# Patient Record
Sex: MALE | Race: ASIAN | Employment: FULL TIME | URBAN - METROPOLITAN AREA
[De-identification: names, ages, dates, MRNs, and addresses within clinical notes are randomized per-mention and may not be internally consistent; named-entity substitution may affect disease eponyms.]

---

## 2017-01-30 DIAGNOSIS — E04.1 THYROID NODULE: ICD-10-CM

## 2017-01-30 DIAGNOSIS — E78.00 ELEVATED CHOLESTEROL: Primary | ICD-10-CM

## 2017-01-30 DIAGNOSIS — R73.03 PREDIABETES: ICD-10-CM

## 2017-01-30 DIAGNOSIS — I49.8 BRUGADA SYNDROME: ICD-10-CM

## 2017-01-30 NOTE — LETTER
3/20/2017 9:02 AM 
 
Mr. Tayla Villaseñor 
[address] [facility] [address] Dear Tayla Villaseñor: 
Please find your most recent results below. Resulted Orders METABOLIC PANEL, COMPREHENSIVE Result Value Ref Range Glucose 100 (H) 65 - 99 mg/dL BUN 17 6 - 24 mg/dL Creatinine 0.95 0.76 - 1.27 mg/dL GFR est non-AA 99 >59 mL/min/1.73 GFR est  >59 mL/min/1.73  
 BUN/Creatinine ratio 18 9 - 20 Sodium 139 134 - 144 mmol/L Potassium 4.6 3.5 - 5.2 mmol/L Chloride 101 96 - 106 mmol/L  
 CO2 22 18 - 29 mmol/L Calcium 9.3 8.7 - 10.2 mg/dL Protein, total 7.1 6.0 - 8.5 g/dL Albumin 4.4 3.5 - 5.5 g/dL GLOBULIN, TOTAL 2.7 1.5 - 4.5 g/dL A-G Ratio 1.6 1.1 - 2.5 Comment: **Effective March 13, 2017 the reference interval** 
  for A/G Ratio will be changing to: Age                Male          Female 0 -  7 days       1.1 - 2.3       1.1 - 2.3 
          8 - 30 days       1.2 - 2.8       1.2 - 2.8 
          1 -  6 months     1.3 - 3.6       1.3 - 3.6 
   7 months -  5 years      1.5 - 2.6       1.5 - 2.6 
             > 5 years      1.2 - 2.2       1.2 - 2.2 Bilirubin, total 0.7 0.0 - 1.2 mg/dL Alk. phosphatase 61 39 - 117 IU/L  
 AST (SGOT) 26 0 - 40 IU/L  
 ALT (SGPT) 34 0 - 44 IU/L Narrative Performed at:  [address]  [id number] : Teo Dowell MD, Phone:  [phone] LIPID PANEL Result Value Ref Range Cholesterol, total 161 100 - 199 mg/dL Triglyceride 121 0 - 149 mg/dL HDL Cholesterol 36 (L) >39 mg/dL VLDL, calculated 24 5 - 40 mg/dL LDL, calculated 101 (H) 0 - 99 mg/dL Narrative Performed at:  [address]  [id number] : Teo Dowell MD, Phone:  [phone] CK Result Value Ref Range Creatine Kinase,Total 272 (H) 24 - 204 U/L Narrative Performed at:  [address]  [id number] : Sherri Lopez MD, Phone:  [phone] TSH 3RD GENERATION Result Value Ref Range TSH 1.360 0.450 - 4.500 uIU/mL Narrative Performed at:  [address]  [id number] : Sherri Lopez MD, Phone:  [phone] HEMOGLOBIN A1C WITH EAG Result Value Ref Range Hemoglobin A1c 6.4 (H) 4.8 - 5.6 % Comment:  
            Pre-diabetes: 5.7 - 6.4 Diabetes: >6.4 Glycemic control for adults with diabetes: <7.0 Estimated average glucose 137 mg/dL Narrative Performed at:  [address]  [id number] : Sherri Lopez MD, Phone:  [phone] CVD REPORT Result Value Ref Range INTERPRETATION Note Comment:  
   Supplement report is available. Narrative Performed at:  [address] [place] [address]  [id number] : Arturo Elizalde PhD, Phone:  [phone] RECOMMENDATIONS: 
Comprehensive Metabolic Panel: [place] Hemoglobin A1C: 6.4 Pre Diabetes (Suggestive of increased risk of developing diabetes in future) Thyroid Tests: Normal  
Cholesterol: marginally Elevated LDL (Bad cholesterol). Marginally  Low HDL  (Good Cholesterol) CK : Elevated to 272. Advice: Stick to low fat low cholesterol diet , exercise regularly and if on medications be compliant with your medications. Avoid concentrated sweets. Make a follow-up appointment with your new PCP. Please call me if you have any questions: [phone] Sincerely, Paulina Riddle MD

## 2017-02-01 ENCOUNTER — OFFICE VISIT (OUTPATIENT)
Dept: CARDIOLOGY CLINIC | Age: 42
End: 2017-02-01

## 2017-02-01 DIAGNOSIS — Z95.810 PRESENCE OF AUTOMATIC CARDIOVERTER/DEFIBRILLATOR (AICD): Primary | ICD-10-CM

## 2017-03-01 LAB
ALBUMIN SERPL-MCNC: 4.4 G/DL (ref 3.5–5.5)
ALBUMIN/GLOB SERPL: 1.6 {RATIO} (ref 1.1–2.5)
ALP SERPL-CCNC: 61 IU/L (ref 39–117)
ALT SERPL-CCNC: 34 IU/L (ref 0–44)
AST SERPL-CCNC: 26 IU/L (ref 0–40)
BILIRUB SERPL-MCNC: 0.7 MG/DL (ref 0–1.2)
BUN SERPL-MCNC: 17 MG/DL (ref 6–24)
BUN/CREAT SERPL: 18 (ref 9–20)
CALCIUM SERPL-MCNC: 9.3 MG/DL (ref 8.7–10.2)
CHLORIDE SERPL-SCNC: 101 MMOL/L (ref 96–106)
CHOLEST SERPL-MCNC: 161 MG/DL (ref 100–199)
CK SERPL-CCNC: 272 U/L (ref 24–204)
CO2 SERPL-SCNC: 22 MMOL/L (ref 18–29)
CREAT SERPL-MCNC: 0.95 MG/DL (ref 0.76–1.27)
EST. AVERAGE GLUCOSE BLD GHB EST-MCNC: 137 MG/DL
GLOBULIN SER CALC-MCNC: 2.7 G/DL (ref 1.5–4.5)
GLUCOSE SERPL-MCNC: 100 MG/DL (ref 65–99)
HBA1C MFR BLD: 6.4 % (ref 4.8–5.6)
HDLC SERPL-MCNC: 36 MG/DL
INTERPRETATION, 910389: NORMAL
LDLC SERPL CALC-MCNC: 101 MG/DL (ref 0–99)
POTASSIUM SERPL-SCNC: 4.6 MMOL/L (ref 3.5–5.2)
PROT SERPL-MCNC: 7.1 G/DL (ref 6–8.5)
SODIUM SERPL-SCNC: 139 MMOL/L (ref 134–144)
TRIGL SERPL-MCNC: 121 MG/DL (ref 0–149)
TSH SERPL DL<=0.005 MIU/L-ACNC: 1.36 UIU/ML (ref 0.45–4.5)
VLDLC SERPL CALC-MCNC: 24 MG/DL (ref 5–40)

## 2017-03-18 NOTE — PROGRESS NOTES
Comprehensive Metabolic Panel : OK  Hemoglobin A1C : 6.4 Pre Diabetes ( Suggestive of increased risk of developing diabetes in future )  Thyroid Tests: Normal  Cholesterol : marginally Elevated LDL ( Bad cholesterol ). Marginally  Low HDL  ( Good Cholesterol )  CK : Elevated to 272. Adv:   Stick to low fat low cholesterol diet , exercise regularly and if on medications be compliant with your medications. Keep your follow-up appointment as scheduled. Avoid concentrated sweets.

## 2017-03-20 ENCOUNTER — OFFICE VISIT (OUTPATIENT)
Dept: FAMILY MEDICINE CLINIC | Age: 42
End: 2017-03-20

## 2017-03-20 VITALS
TEMPERATURE: 97.9 F | WEIGHT: 196.6 LBS | DIASTOLIC BLOOD PRESSURE: 90 MMHG | BODY MASS INDEX: 29.8 KG/M2 | OXYGEN SATURATION: 97 % | HEART RATE: 66 BPM | HEIGHT: 68 IN | SYSTOLIC BLOOD PRESSURE: 134 MMHG | RESPIRATION RATE: 16 BRPM

## 2017-03-20 DIAGNOSIS — R73.03 PREDIABETES: ICD-10-CM

## 2017-03-20 DIAGNOSIS — Z00.00 PHYSICAL EXAM: Primary | ICD-10-CM

## 2017-03-20 DIAGNOSIS — I49.8 BRUGADA SYNDROME: ICD-10-CM

## 2017-03-20 RX ORDER — CHOLECALCIFEROL (VITAMIN D3) 125 MCG
CAPSULE ORAL
COMMUNITY
End: 2019-01-18

## 2017-03-20 NOTE — PROGRESS NOTES
Lennox Cooks is a 39 y.o. male   Chief Complaint   Patient presents with    Physical   1. Have you been to the ER, urgent care clinic since your last visit? Hospitalized since your last visit? No    2. Have you seen or consulted any other health care providers outside of the [address] [place] [address] since your last visit? Include any pap smears or colon screening.  No

## 2017-03-20 NOTE — MR AVS SNAPSHOT
Visit Information Date & Time Provider Department Dept. Phone Encounter #  
 3/20/2017  3:20 PM MD Guillermo Boogie Marcia [id number] Follow-up Instructions Return in about 6 months (around 9/20/2017) for Follow-up, Fasting, CHOL, Prediabetes. Your Appointments 6/8/2017  2:30 PM  
ROUTINE CARE with MD Ant R[facility]) Appt Note: 9 month follow- up; r/s from 06/05; f/u thyroid cp0.00  
 [address]  
[address] [address]  
  
    
 8/24/2017  2:45 PM  
PACEMAKER with Beltran Sharma [facility] ([place] SCHEDULING) Appt Note: bio icd, rc, annual thresh and HM, see Augustin  $60CP anand 7/21/16  
 [address]  
[address] [facility]  
  
    
 8/24/2017  3:00 PM  
ESTABLISHED PATIENT with Meka Gregg MD  
[facility] ([facility]) Appt Note: bio icd, rc, annual thresh and HM, see Augustin  $60CP anand 7/21/16  
 [address] [address]  
[address] [address]  
  
    
  
 5/16/2017  9:15 AM  
REMOTE OFFICE VISIT with Simon Quinteros [facility] ([place] SCHEDULING) Appt Note: bio icd, rc  
 [address]  
[address] [facility] [address] [address] Upcoming Health Maintenance Date Due DTaP/Tdap/Td series (2 - Td) 11/7/2026 Allergies as of 3/20/2017  Review Complete On: 3/20/2017 By: Mariann Alvarenga MD  
  
 Severity Noted Reaction Type Reactions Beef Containing Products  02/02/2015    Unknown (comments) Synagogue preference Pork/porcine Containing Products  02/02/2015    Unknown (comments) Jew preference Current Immunizations  Reviewed on 2/3/2015 No immunizations on file. Not reviewed this visit You Were Diagnosed With   
  
 Codes Comments Physical exam    -  Primary ICD-10-CM: Z00.00 ICD-9-CM: V70.9 Brugada syndrome     ICD-10-CM: I49.8 ICD-9-CM: 746.89 Prediabetes     ICD-10-CM: R73.03 
ICD-9-CM: 790.29 Vitals BP Pulse Temp Resp Height(growth percentile) Weight(growth percentile) 134/90 (BP 1 Location: Left arm, BP Patient Position: Sitting) 66 97.9 °F (36.6 °C) (Oral) 16 5' 8\" (1.727 m) 196 lb 9.6 oz (89.2 kg) SpO2 BMI Smoking Status 97% 29.89 kg/m2 Never Smoker Vitals History BMI and BSA Data Body Mass Index Body Surface Area  
 29.89 kg/m 2 2.07 m 2 Preferred Pharmacy Pharmacy Name Phone [id number] [address] [facility] AT [address] [place] [address] [phone] Your Updated Medication List  
  
   
This list is accurate as of: 3/20/17  4:46 PM.  Always use your most recent med list.  
  
  
  
  
 fexofenadine 180 mg tablet Commonly known as:  ALLERGY RELIEF (FEXOFENADINE) TAKE 1 TABLET BY MOUTH EVERY DAY  
  
 fish oil-omega-3 fatty acids 340-1,000 mg capsule Take 1 Cap by mouth daily. simvastatin 10 mg tablet Commonly known as:  ZOCOR  
TAKE 1 TABLET BY MOUTH EVERY EVENING  
  
 VITAMIN D3 2,000 unit Tab Generic drug:  cholecalciferol (vitamin D3) Take  by mouth. Follow-up Instructions Return in about 6 months (around 9/20/2017) for Follow-up, Fasting, CHOL, Prediabetes. To-Do List   
 05/22/2017 10:00 AM  
  Appointment with [address] [place] [address] at [facility] ([phone]) GENERAL INSTRUCTIONS  1. Bring outside films ([employer]) pertaining to the affected area with you on the day of appointment.  2. A written order with a valid diagnosis and Physicians signature is required for all scheduled tests. 3. Check in at registration 30 minutes before your appointment time unless you were instructed to do otherwise. Patient Instructions A Healthy Lifestyle: Care Instructions Your Care Instructions A healthy lifestyle can help you feel good, stay at a healthy weight, and have plenty of energy for both work and play. A healthy lifestyle is something you can share with your whole family. A healthy lifestyle also can lower your risk for serious health problems, such as high blood pressure, heart disease, and diabetes. You can follow a few steps listed below to improve your health and the health of your family. Follow-up care is a key part of your treatment and safety. Be sure to make and go to all appointments, and call your doctor if you are having problems. Its also a good idea to know your test results and keep a list of the medicines you take. How can you care for yourself at home? · Do not eat too much sugar, fat, or fast foods. You can still have dessert and treats now and then. The goal is moderation. · Start small to improve your eating habits. Pay attention to portion sizes, drink less juice and soda pop, and eat more fruits and vegetables. ¨ Eat a healthy amount of food. A 3-ounce serving of meat, for example, is about the size of a deck of cards. Fill the rest of your plate with vegetables and whole grains. ¨ Limit the amount of soda and sports drinks you have every day. Drink more water when you are thirsty. ¨ Eat at least 5 servings of fruits and vegetables every day. It may seem like a lot, but it is not hard to reach this goal. A serving or helping is 1 piece of fruit, 1 cup of vegetables, or 2 cups of leafy, raw vegetables. Have an apple or some carrot sticks as an afternoon snack instead of a candy bar.  Try to have fruits and/or vegetables at every meal. 
 · Make exercise part of your daily routine. You may want to start with simple activities, such as walking, bicycling, or slow swimming. Try to be active 30 to 60 minutes every day. You do not need to do all 30 to 60 minutes all at once. For example, you can exercise 3 times a day for 10 or 20 minutes. Moderate exercise is safe for most people, but it is always a good idea to talk to your doctor before starting an exercise program. 
· Keep moving. [place] Feeling the lawn, work in the garden, or [employer]. Take the stairs instead of the elevator at work. · If you smoke, quit. People who smoke have an increased risk for heart attack, stroke, cancer, and other lung illnesses. Quitting is hard, but there are ways to boost your chance of quitting tobacco for good. ¨ Use nicotine gum, patches, or lozenges. ¨ Ask your doctor about stop-smoking programs and medicines. ¨ Keep trying. In addition to reducing your risk of diseases in the future, you will notice some benefits soon after you stop using tobacco. If you have shortness of breath or asthma symptoms, they will likely get better within a few weeks after you quit. · Limit how much alcohol you drink. Moderate amounts of alcohol (up to 2 drinks a day for men, 1 drink a day for women) are okay. But drinking too much can lead to liver problems, high blood pressure, and other health problems. Family health If you have a family, there are many things you can do together to improve your health. · Eat meals together as a family as often as possible. · Eat healthy foods. This includes fruits, vegetables, lean meats and dairy, and whole grains. · Include your family in your fitness plan. Most people think of activities such as jogging or tennis as the way to fitness, but there are many ways you and your family can be more active. Anything that makes you breathe hard and gets your heart pumping is exercise. Here are some tips: ¨ Walk to do errands or to take your child to school or the bus. ¨ Go for a family bike ride after dinner instead of watching TV. Where can you learn more? Go to http://joanne-magali.info/. Enter Z937 in the search box to learn more about \"A Healthy Lifestyle: Care Instructions. \" Current as of: July 26, 2016 Content Version: 11.1 © 1776-4198 Data Elite. Care instructions adapted under license by Cytoo (which disclaims liability or warranty for this information). If you have questions about a medical condition or this instruction, always ask your healthcare professional. [address] any warranty or liability for your use of this information. Introducing [facility] & HEALTH SERVICES! Dear Nay Ashford: Thank you for requesting a [employer] account. Our records indicate that you already have an active [employer] account. You can access your account anytime at [email or web address] Did you know that you can access your hospital and ER discharge instructions at any time in [employer]? You can also review all of your test results from your hospital stay or ER visit. Additional Information If you have questions, please visit the Frequently Asked Questions section of the [employer] website at [email or web address] Remember, [employer] is NOT to be used for urgent needs. For medical emergencies, dial 911. Now available from your iPhone and Android! Please provide this summary of care documentation to your next provider. Your primary care clinician is listed as Tiffanie Corrigan. If you have any questions after today's visit, please call [phone].

## 2017-03-20 NOTE — PROGRESS NOTES
HISTORY OF PRESENT ILLNESS  Ankur Thurman is a 39 y.o. male. Physical   The history is provided by the patient (Here for physical . He is generally well . Known to have Brugada syndrome and hypercholesterolemia . ). Review of Systems   Constitutional: Negative. HENT: Negative. Eyes: Negative. Respiratory: Negative. Cardiovascular: Negative. Gastrointestinal: Negative. Genitourinary: Negative. Musculoskeletal: Negative. Skin: Negative. Neurological: Negative. Endo/Heme/Allergies: Negative. Psychiatric/Behavioral: Negative. Physical Exam  General:   Head: Alert, cooperative, no distress, appears stated age. Normocephalic and atraumatic   Eyes:  Conjunctivae/corneas clear. PERRL, EOMs intact. Ears:  Normal TMs and external ear canals both ears. Nose: Nares normal. Septum midline. Mucosa normal. No drainage or sinus tenderness. Mouth:  Throat: Lips, mucosa, and tongue normal. Teeth and gums normal.  No lesions or exudates. Neck: Supple, symmetrical, trachea midline, no adenopathy, thyroid: no enlargement/tenderness/nodules. Back:   Symmetric, no curvature. ROM normal. No CVA tenderness. Lungs:   Clear to auscultation bilaterally. Heart:  Regular rate and rhythm, S1, S2 normal, no murmur, click, rub or gallop. Abdomen:      G.U: Soft, non-tender. Bowel sounds normal. No masses,  No organomegaly. Hernial sites normal   Genitalia normal    Extremities: Extremities normal, atraumatic, no cyanosis or edema. Pulses: 2+ and symmetric all extremities. Skin: Skin color, texture, turgor normal. No rashes or lesions   Lymph nodes: Cervical & supraclavicular nodes normal.   Neurologic: CNII-XII intact. Normal strength, sensation and reflexes throughout. Psych:                      Normal mood and affect     ASSESSMENT and PLAN  39 yr old male for physical with   Prediabetes   Hx of Brugada syndrome . Encounter Diagnoses   Name Primary?     Physical exam Yes  Brugada syndrome     Prediabetes      Orders Placed This Encounter    fish oil-omega-3 fatty acids 340-1,000 mg capsule    cholecalciferol, vitamin D3, (VITAMIN D3) 2,000 unit tab   Follow-up Disposition:  Return in about 6 months (around 9/20/2017) for Follow-up, Fasting, CHOL, Prediabetes. Reviewed and discussed previous lab results. Results of labs requested today will be notified when available. He was advised to continue with current medications. Discussed lifestyle issues and health guidance given. He was given an after visit summary which includes diagnoses, vital signs, current medications, &  authorized prescriptions. Patient verbalized understanding.

## 2017-03-20 NOTE — PATIENT INSTRUCTIONS

## 2017-05-16 ENCOUNTER — OFFICE VISIT (OUTPATIENT)
Dept: CARDIOLOGY CLINIC | Age: 42
End: 2017-05-16

## 2017-05-16 DIAGNOSIS — Z95.810 PRESENCE OF AUTOMATIC CARDIOVERTER/DEFIBRILLATOR (AICD): Primary | ICD-10-CM

## 2017-05-23 ENCOUNTER — HOSPITAL ENCOUNTER (OUTPATIENT)
Dept: ULTRASOUND IMAGING | Age: 42
Discharge: HOME | End: 2017-05-23
Attending: INTERNAL MEDICINE
Payer: COMMERCIAL

## 2017-05-23 DIAGNOSIS — E04.1 THYROID NODULE: ICD-10-CM

## 2017-05-23 PROCEDURE — 76536 US EXAM OF HEAD AND NECK: CPT

## 2017-06-04 ENCOUNTER — PATIENT MESSAGE (OUTPATIENT)
Dept: FAMILY MEDICINE CLINIC | Age: 42
End: 2017-06-04

## 2017-06-05 RX ORDER — MINERAL OIL
ENEMA (ML) RECTAL
Qty: 90 TAB | Refills: 1 | Status: SHIPPED | OUTPATIENT
Start: 2017-06-05 | End: 2018-04-11 | Stop reason: SDUPTHER

## 2017-06-05 NOTE — TELEPHONE ENCOUNTER
From: Vic Claudio  To: Prince Genao MD  Sent: 6/4/2017 4:44 PM EDT  Subject: Prescription Question    I was a patient of Erick Wilcox , he suggested that I make you my primary physician so I scheduled my first appointment for my regular check in July 10 th , I was wondering if you can refill in the meanwhile refill my prescription for allergy medicine fexofenadine , I will ask [facility] to contact you for a refill which Dr Anne Raygoza prescribed

## 2017-06-19 ENCOUNTER — OFFICE VISIT (OUTPATIENT)
Dept: FAMILY MEDICINE CLINIC | Age: 42
End: 2017-06-19

## 2017-06-19 VITALS
HEART RATE: 80 BPM | OXYGEN SATURATION: 98 % | TEMPERATURE: 97.8 F | DIASTOLIC BLOOD PRESSURE: 72 MMHG | HEIGHT: 68 IN | WEIGHT: 198 LBS | SYSTOLIC BLOOD PRESSURE: 110 MMHG | BODY MASS INDEX: 30.01 KG/M2 | RESPIRATION RATE: 16 BRPM

## 2017-06-19 DIAGNOSIS — E78.5 HYPERLIPIDEMIA LDL GOAL <100: Primary | ICD-10-CM

## 2017-06-19 DIAGNOSIS — R73.03 PREDIABETES: ICD-10-CM

## 2017-06-19 DIAGNOSIS — Z95.810 S/P ICD (INTERNAL CARDIAC DEFIBRILLATOR) PROCEDURE: ICD-10-CM

## 2017-06-19 DIAGNOSIS — R74.8 ELEVATED CPK: ICD-10-CM

## 2017-06-19 RX ORDER — AMOXICILLIN 875 MG/1
TABLET, FILM COATED ORAL
Refills: 0 | COMMUNITY
Start: 2017-04-17 | End: 2017-06-19 | Stop reason: ALTCHOICE

## 2017-06-19 RX ORDER — AMOXICILLIN 500 MG/1
CAPSULE ORAL
COMMUNITY
Start: 2017-06-01 | End: 2017-06-19 | Stop reason: ALTCHOICE

## 2017-06-19 RX ORDER — DICLOFENAC SODIUM 75 MG/1
TABLET, DELAYED RELEASE ORAL
Refills: 0 | COMMUNITY
Start: 2017-05-08 | End: 2017-06-19 | Stop reason: ALTCHOICE

## 2017-06-19 RX ORDER — METHYLPREDNISOLONE 4 MG/1
TABLET ORAL
Refills: 0 | COMMUNITY
Start: 2017-04-17 | End: 2017-06-19 | Stop reason: ALTCHOICE

## 2017-06-19 RX ORDER — ONDANSETRON 4 MG/1
TABLET, ORALLY DISINTEGRATING ORAL
COMMUNITY
Start: 2017-06-01 | End: 2017-06-19 | Stop reason: ALTCHOICE

## 2017-06-19 NOTE — MR AVS SNAPSHOT
Visit Information Date & Time Provider Department Dept. Phone Encounter #  
 6/19/2017  5:20 PM Xavier Rodríguez, [address] [phone] [id number] Follow-up Instructions Return in about 4 months (around 10/19/2017) for  follow up, CHO, DM. Your Appointments 6/23/2017 10:10 AM  
ROUTINE CARE with Giovanna Lofton MD  
[place] [facility]) [address] [facility] [address]  
[address] [place] [address]  
  
    
 8/24/2017  2:45 PM  
PACEMAKER with Cande Velasquez [facility] ([place] SCHEDULING) Appt Note: bio icd, rc, annual thresh and HM, see Augustin  $60CP anand 7/21/16  
 [address]  
[address] [facility]  
  
    
 8/24/2017  3:00 PM  
ESTABLISHED PATIENT with Keisha Garcia MD  
[facility] ([facility]) Appt Note: bio icd, rc, annual thresh and HM, see Augustin  $60CP anand 7/21/16  
 [address]  
[address] [facility] [facility] [address] Upcoming Health Maintenance Date Due INFLUENZA AGE 9 TO ADULT 8/1/2017 DTaP/Tdap/Td series (2 - Td) 11/7/2026 Allergies as of 6/19/2017  Review Complete On: 6/19/2017 By: Xavier Rodríguez MD  
  
 Severity Noted Reaction Type Reactions Beef Containing Products  02/02/2015    Unknown (comments) Baptism preference Pork/porcine Containing Products  02/02/2015    Unknown (comments) Baptism preference Current Immunizations  Reviewed on 2/3/2015 No immunizations on file. Not reviewed this visit You Were Diagnosed With   
  
 Codes Comments Hyperlipidemia LDL goal <100    -  Primary ICD-10-CM: E78.5 ICD-9-CM: 272.4 S/P ICD (internal cardiac defibrillator) procedure     ICD-10-CM: Z95.810 ICD-9-CM: V45.02 Prediabetes     ICD-10-CM: R73.03 
ICD-9-CM: 790.29 Elevated CPK     ICD-10-CM: R74.8 ICD-9-CM: 790.5 Vitals BP Pulse Temp Resp Height(growth percentile) Weight(growth percentile) 110/72 (BP 1 Location: Left arm, BP Patient Position: Sitting) 80 97.8 °F (36.6 °C) (Oral) 16 5' 8\" (1.727 m) 198 lb (89.8 kg) SpO2 BMI Smoking Status 98% 30.11 kg/m2 Never Smoker Vitals History BMI and BSA Data Body Mass Index Body Surface Area  
 30.11 kg/m 2 2.08 m 2 Preferred Pharmacy Pharmacy Name Phone [facility]/PHARMACY #[id number]- [facility] [address] [facility] AT [address] [place] [address] [phone] Your Updated Medication List  
  
   
This list is accurate as of: 6/19/17  6:03 PM.  Always use your most recent med list.  
  
  
  
  
 fexofenadine 180 mg tablet Commonly known as:  ALLERGY RELIEF (FEXOFENADINE) TAKE 1 TABLET BY MOUTH EVERY DAY  
  
 fish oil-omega-3 fatty acids 340-1,000 mg capsule Take 1 Cap by mouth daily. simvastatin 10 mg tablet Commonly known as:  ZOCOR  
TAKE 1 TABLET BY MOUTH EVERY EVENING  
  
 VITAMIN D3 2,000 unit Tab Generic drug:  cholecalciferol (vitamin D3) Take  by mouth. We Performed the Following CK [id number] CPT(R)] HEMOGLOBIN A1C WITH EAG [64674 CPT(R)] LIPID PANEL [97165 CPT(R)] METABOLIC PANEL, COMPREHENSIVE [45582 CPT(R)] Follow-up Instructions Return in about 4 months (around 10/19/2017) for  follow up, CHO, DM. Patient Instructions High Cholesterol: Care Instructions Your Care Instructions Cholesterol is a type of fat in your blood. It is needed for many body functions, such as making new cells. Cholesterol is made by your body. It also comes from food you eat.  High cholesterol means that you have too much of the fat in your blood. This raises your risk of a heart attack and stroke. LDL and HDL are part of your total cholesterol. LDL is the \"bad\" cholesterol. High LDL can raise your risk for heart disease, heart attack, and stroke. HDL is the \"good\" cholesterol. It helps clear bad cholesterol from the body. High HDL is linked with a lower risk of heart disease, heart attack, and stroke. Your cholesterol levels help your doctor find out your risk for having a heart attack or stroke. You and your doctor can talk about whether you need to lower your risk and what treatment is best for you. A heart-healthy lifestyle along with medicines can help lower your cholesterol and your risk. The way you choose to lower your risk will depend on how high your risk is for heart attack and stroke. It will also depend on how you feel about taking medicines. Follow-up care is a key part of your treatment and safety. Be sure to make and go to all appointments, and call your doctor if you are having problems. It's also a good idea to know your test results and keep a list of the medicines you take. How can you care for yourself at home? · Eat a variety of foods every day. Good choices include fruits, vegetables, whole grains (like oatmeal), dried beans and peas, nuts and seeds, soy products (like tofu), and fat-free or low-fat dairy products. · Replace butter, margarine, and hydrogenated or partially hydrogenated oils with olive and canola oils. (Canola oil margarine without trans fat is fine.) · Replace red meat with fish, poultry, and soy protein (like tofu). · Limit processed and packaged foods like chips, crackers, and cookies. · Bake, broil, or steam foods. Don't springer them. · Be physically active. Get at least 30 minutes of exercise on most days of the week. Walking is a good choice. You also may want to do other activities, such as running, swimming, cycling, or playing tennis or team sports. · Stay at a healthy weight or lose weight by making the changes in eating and physical activity listed above. Losing just a small amount of weight, even 5 to 10 pounds, can reduce your risk for having a heart attack or stroke. · Do not smoke. When should you call for help? Watch closely for changes in your health, and be sure to contact your doctor if: 
· You need help making lifestyle changes. · You have questions about your medicine. Where can you learn more? Go to http://joanne-magali.info/. Enter B603 in the search box to learn more about \"High Cholesterol: Care Instructions. \" Current as of: April 3, 2017 Content Version: 11.3 © 1736-8207 Credorax. Care instructions adapted under license by Soteira (which disclaims liability or warranty for this information). If you have questions about a medical condition or this instruction, always ask your healthcare professional. [address] any warranty or liability for your use of this information. Introducing [facility] & HEALTH SERVICES! Dear Naya Carlson: Thank you for requesting a [employer] account. Our records indicate that you already have an active [employer] account. You can access your account anytime at [email or web address] Did you know that you can access your hospital and ER discharge instructions at any time in [employer].? You can also review all of your test results from your hospital stay or ER visit. Additional Information If you have questions, please visit the Frequently Asked Questions section of the [employer] website at [email or web address] Remember, [employer] is NOT to be used for urgent needs. For medical emergencies, dial 911. Now available from your iPhone and Android! Please provide this summary of care documentation to your next provider. Your primary care clinician is listed as Gil Jewell.  If you have any questions after today's visit, please call [phone].

## 2017-06-19 NOTE — PROGRESS NOTES
HISTORY OF PRESENT ILLNESS  Bhumi Beckford is a 39 y.o. male. He is a new patient and is here to establish care. Previous followed by Dr Bisi Heath. The following sections were reviewed & updated as appropriate: PMH, PL, PSH, and SH. He was seen for follow up on dyslipidemia, prediabetes and elevated CPK. He is s/p AICD for Brugada sx. HPI  Cardiovascular Review  The patient has hyperlipidemia and Brugada sx, s/p AICD. He reports taking medications as instructed, no medication side effects noted, no chest pain on exertion, no dyspnea on exertion, no swelling of ankles, no orthostatic dizziness or lightheadedness, no orthopnea or paroxysmal nocturnal dyspnea, no palpitations, no intermittent claudication symptoms, no muscle aches or pain. Diet and Lifestyle: generally follows a low fat low cholesterol diet, generally follows a low sodium diet, follows a diabetic diet regularly, exercises regularly. Lab review: labs reviewed and discussed with patient. Medicines: On Simvastatin 10 mg and Fish oil  He follows cardiology on annual bases and gets AICD interrogation and monitoring remotely. He has side effect of statin in form pf elevated CPK. Endocrine Review  He is seen for prediabetes. Since last visit he reports: no polyuria or polydipsia, no chest pain, dyspnea or TIA's, no numbness, tingling or pain in extremities, no unusual visual symptoms, no hypoglycemia, no significant changes. Home glucose monitoring: is not performed  no  n/a - not on medications (diet controlled). Medication side effects: n/a. Diabetic diet compliance: compliant all of the time. Lab review: labs reviewed and discussed with patient. Eye exam: UTD.       Lab Results   Component Value Date/Time    Hemoglobin A1c 6.4 02/28/2017 07:46 AM    Glucose 100 02/28/2017 07:46 AM    Creatinine 0.95 02/28/2017 07:46 AM    Cholesterol, total 161 02/28/2017 07:46 AM    HDL Cholesterol 36 02/28/2017 07:46 AM    LDL, calculated 101 02/28/2017 07:46 AM    Triglyceride 121 02/28/2017 07:46 AM       Review of Systems   Constitutional: Negative for chills, fever and malaise/fatigue. HENT: Negative for congestion, ear pain, sore throat and tinnitus. Eyes: Negative for blurred vision, double vision, pain and discharge. Respiratory: Negative for cough, shortness of breath and wheezing. Cardiovascular: Negative for chest pain, palpitations and leg swelling. Gastrointestinal: Negative for abdominal pain, blood in stool, constipation, diarrhea, nausea and vomiting. Genitourinary: Negative for dysuria, frequency, hematuria and urgency. Musculoskeletal: Negative for back pain, joint pain and myalgias. Skin: Negative for rash. Neurological: Negative for dizziness, tremors, seizures and headaches. Endo/Heme/Allergies: Negative for polydipsia. Does not bruise/bleed easily. Psychiatric/Behavioral: Negative for depression and substance abuse. The patient is not nervous/anxious. Physical Exam   Constitutional: He is oriented to person, place, and time. He appears well-developed and well-nourished. HENT:   Head: Normocephalic and atraumatic. Right Ear: External ear normal.   Mouth/Throat: Oropharynx is clear and moist. No oropharyngeal exudate. Eyes: Conjunctivae and EOM are normal. Pupils are equal, round, and reactive to light. No scleral icterus. Neck: Normal range of motion. Neck supple. No JVD present. No thyromegaly present. Cardiovascular: Normal rate, regular rhythm, normal heart sounds and intact distal pulses. No murmur heard. ICD in place, left upper chest   Pulmonary/Chest: Effort normal and breath sounds normal. He has no wheezes. Abdominal: Soft. Bowel sounds are normal. He exhibits no distension and no mass. Musculoskeletal: Normal range of motion. He exhibits no edema or tenderness. Lymphadenopathy:     He has no cervical adenopathy.    Neurological: He is alert and oriented to person, place, and time. He has normal reflexes. No cranial nerve deficit. Skin: Skin is warm and dry. No rash noted. He is not diaphoretic. Psychiatric: He has a normal mood and affect. Nursing note and vitals reviewed. ASSESSMENT and PLAN  Pablo Singh was seen today for cholesterol problem and blood sugar problem. Diagnoses and all orders for this visit:    Hyperlipidemia LDL goal <371  -     METABOLIC PANEL, COMPREHENSIVE  -     LIPID PANEL    S/P ICD (internal cardiac defibrillator) procedure  -     METABOLIC PANEL, COMPREHENSIVE    Prediabetes  -     METABOLIC PANEL, COMPREHENSIVE  -     HEMOGLOBIN A1C WITH EAG    Elevated CPK  -     METABOLIC PANEL, COMPREHENSIVE  -     CK    advised to start ASA 81 mg . Discussed lifestyle issues and health guidance given  Patient was given an after visit summary which includes diagnoses, vital signs, current medications, instructions and references & authorized prescriptions . Results of labs will be conveyed to patient, once available. Pt verbalized instructions I provided and expressed understanding of discussion that was held today. Follow-up Disposition:  Return in about 4 months (around 10/19/2017) for  follow up, CYRUS BETTENCOURT.

## 2017-06-19 NOTE — PROGRESS NOTES
Chief Complaint   Patient presents with    Cholesterol Problem    Blood sugar problem     Prediabetes     1. Have you been to the ER, urgent care clinic since your last visit? Hospitalized since your last visit? No    2. Have you seen or consulted any other health care providers outside of the [facility] since your last visit? Include any pap smears or colon screening.  No

## 2017-06-19 NOTE — PATIENT INSTRUCTIONS

## 2017-06-23 ENCOUNTER — OFFICE VISIT (OUTPATIENT)
Dept: ENDOCRINOLOGY | Age: 42
End: 2017-06-23

## 2017-06-23 VITALS
HEIGHT: 68 IN | WEIGHT: 198 LBS | BODY MASS INDEX: 30.01 KG/M2 | DIASTOLIC BLOOD PRESSURE: 92 MMHG | RESPIRATION RATE: 20 BRPM | HEART RATE: 75 BPM | SYSTOLIC BLOOD PRESSURE: 128 MMHG

## 2017-06-23 DIAGNOSIS — R73.03 PREDIABETES: ICD-10-CM

## 2017-06-23 DIAGNOSIS — E04.1 THYROID NODULE: Primary | ICD-10-CM

## 2017-06-23 NOTE — PROGRESS NOTES
Wt Readings from Last 3 Encounters:   06/23/17 198 lb (89.8 kg)   06/19/17 198 lb (89.8 kg)   03/20/17 196 lb 9.6 oz (89.2 kg)     Temp Readings from Last 3 Encounters:   06/19/17 97.8 °F (36.6 °C) (Oral)   03/20/17 97.9 °F (36.6 °C) (Oral)   11/07/16 97.8 °F (36.6 °C) (Oral)     BP Readings from Last 3 Encounters:   06/23/17 (!) 128/92   06/19/17 110/72   03/20/17 134/90     Pulse Readings from Last 3 Encounters:   06/23/17 75   06/19/17 80   03/20/17 66     Lab Results   Component Value Date/Time    TSH 1.360 02/28/2017 07:46 AM    T4, Free 1.28 02/05/2016 08:47 AM     Lab Results   Component Value Date/Time    Hemoglobin A1c 6.4 02/28/2017 07:46 AM

## 2017-06-23 NOTE — PROGRESS NOTES
Endocrinology Visit    CC: thyroid nodule    History of present illness:  Patient is an 39y.o. year old male who returns for f/u of a thyroid nodule. I saw him in initial consultation in October at which time, I recommend against FNA given the small size and benign appearance of the nodule. He had a f/u ultrasound done last month and it showed no change to the nodule size. The patient denies neck pain, changes to the size of his neck, dysphagia, supine compression, or voice hoarseness. He has a history of palpitations and was diagnosed with Brugada syndrome, now s/p pacemaker implantation and is doing well. He has no history of thyroid dysfunction. Today, he denies racing heart, tremors, palpitations, heat or cold intolerance, significant changes in his weight, fatigue, insomnia, constipation, diarrhea, or excessive sweating. He exercises 5 days a week and is trying to limit his carbohydrate intake due to a diagnosis of prediabetes (A1c 6.4%). Goes to the gym and has a  - does a combination of weights and cardio. He tries to limit the amount of carbohydrates in his diet but admits he has a sweet tooth and indulges in dessert a couple times per week.  Typical meals are as follows:  Breakfast: skim milk with plain cheerios  Lunch: whole grain roti with vegetables and salad, chicken  Dinner: vegetables, rice, chicken, chickpeas    ROS: see HPI for pertinent positives and negatives, otherwise a 10 system review is negative    Problem list:  Patient Active Problem List   Diagnosis Code    Hyperlipidemia LDL goal <100 E78.5    Tendonitis, Achilles M76.60    Syncope R55    Brugada syndrome I49.8    S/P ICD (internal cardiac defibrillator) procedure Z95.810    Thyroid nodule E04.1    Prediabetes R73.03    Elevated CPK R74.8       Medical history:  Past Medical History:   Diagnosis Date    High cholesterol     Hypercholesteremia     Seizures (HCC)        Past surgical history:  Past Surgical History:   Procedure Laterality Date    INS PPM/ICD LED SING ONLY  2/2/2015            Medications:    Current Outpatient Prescriptions:     fexofenadine (ALLERGY RELIEF, FEXOFENADINE,) 180 mg tablet, TAKE 1 TABLET BY MOUTH EVERY DAY, Disp: 90 Tab, Rfl: 1    fish oil-omega-3 fatty acids 340-1,000 mg capsule, Take 1 Cap by mouth daily. , Disp: , Rfl:     cholecalciferol, vitamin D3, (VITAMIN D3) 2,000 unit tab, Take  by mouth., Disp: , Rfl:     simvastatin (ZOCOR) 10 mg tablet, TAKE 1 TABLET BY MOUTH EVERY EVENING, Disp: 90 Tab, Rfl: 2    Allergies: Allergies   Allergen Reactions    Beef Containing Products Unknown (comments)     Yarsanism preference    Pork/Porcine Containing Products Unknown (comments)     Yarsanism preference       Social History:  Social History     Social History    Marital status:      Spouse name: N/A    Number of children: N/A    Years of education: N/A     Occupational History    Not on file. Social History Main Topics    Smoking status: Never Smoker    Smokeless tobacco: Never Used    Alcohol use No    Drug use: No    Sexual activity: Yes     Partners: Female     Other Topics Concern    Not on file     Social History Narrative       Family History:  Family History   Problem Relation Age of Onset    Diabetes Father     Elevated Lipids Father     Hypertension Father     Heart Disease Father     Hypertension Mother     Elevated Lipids Brother     Hypertension Brother        Physical Exam:  Visit Vitals    BP (!) 128/92    Pulse 75    Resp 20    Ht 5' 8\" (1.727 m)    Wt 198 lb (89.8 kg)    BMI 30.11 kg/m2     Gen: NAD  Heent: mucous membranes moist, no lid lag, stare or exophthalmos. No scleral or conjunctival injection. Extra ocular muscles intact.   Thyroid: moves well with swallowing, apprx 1.5x normal size, normal texture, no masses appreciated  Heme/lymph: no cervical, supraclavicular or submandibular lymphadenopathy is appreciated. Pulmonary: clear to auscultation bilaterally, no wheezes or rales  Cardiovascular: regular rate and rhythm, no murmurs, rubs or gallops, good distal pulses  Extremities: no clubbing, cyanosis or edema. No onocholysis   Neuro: no tremor of outstretched hands, reflexes are normal with normal relaxation phase. Normal gait, normal concentration  Skin: normal texture, warm and dry  Psyche: normal affect with good insight into his medical conditions    Pertinent lab review:  Lab Results   Component Value Date/Time    TSH 1.360 02/28/2017 07:46 AM    T4, Free 1.28 02/05/2016 08:47 AM     Lab Results   Component Value Date/Time    Hemoglobin A1c 6.4 02/28/2017 07:46 AM     Ultrasound May 2017  FINDINGS:  There is a 4 x 3 x 3 mm nodule upper pole left thyroid which contains a tiny  focus of calcification in this is unchanged. No dominant nodules are  identified.     The right lobe measures 6.3 x 1.4 x 2.3 cm and the left lobe measures 5.9 x 1.4  x 1.7 cm. The isthmus measures 2 mm.     IMPRESSION: No change in the 4 mm nodule left thyroid. Assessment and Plan:  Patient is a pleasant 39y.o. year old here for small left sided thyroid nodule and prediabetes. Based on the JO guidelines, I do not recommend thyroid FNA given the nodule's relatively benign features and small size (<1cm). Additionally, it has not demonstrated significant growth in the past 12 months. I advised the patient to watch for changes in his neck and notify me if he feels it is enlarging, at which time I would recommend a f/u US. Otherwise, routine f/u ultrasonography is not needed. Patients thyroid levels were normal on last several checks and he is clinically euthyroid on exam.     We also discussed his diagnosis of prediabetes and potential preventative techniques. He has already made several lifestyle modifications to reduce dietary carb intake and increase his physical activity.  Advised further reduction in carbohydrates and also discussed the role of metformin in preventing progression of prediabetes to diabetes. Plan to f/u A1c result that was drawn today - if uptrending despite lifestyle modification I would recommend initiation of metformin. I spent 25 minutes with the patient today and > 50% of the time was spent counseling the patient about thyroid nodules and dietary modification for prevention of diabetes. He will f/u with me in 1 year or sooner if needed. Thank you for the opportunity to partake in this patients care.     Nhung Cleveland MD  [facility]  [address] [place] [address]

## 2017-06-24 LAB
ALBUMIN SERPL-MCNC: 4.4 G/DL (ref 3.5–5.5)
ALBUMIN/GLOB SERPL: 1.5 {RATIO} (ref 1.2–2.2)
ALP SERPL-CCNC: 66 IU/L (ref 39–117)
ALT SERPL-CCNC: 40 IU/L (ref 0–44)
AST SERPL-CCNC: 28 IU/L (ref 0–40)
BILIRUB SERPL-MCNC: 0.8 MG/DL (ref 0–1.2)
BUN SERPL-MCNC: 13 MG/DL (ref 6–24)
BUN/CREAT SERPL: 12 (ref 9–20)
CALCIUM SERPL-MCNC: 10.1 MG/DL (ref 8.7–10.2)
CHLORIDE SERPL-SCNC: 102 MMOL/L (ref 96–106)
CHOLEST SERPL-MCNC: 176 MG/DL (ref 100–199)
CK SERPL-CCNC: 246 U/L (ref 24–204)
CO2 SERPL-SCNC: 26 MMOL/L (ref 18–29)
CREAT SERPL-MCNC: 1.07 MG/DL (ref 0.76–1.27)
EST. AVERAGE GLUCOSE BLD GHB EST-MCNC: 126 MG/DL
GLOBULIN SER CALC-MCNC: 2.9 G/DL (ref 1.5–4.5)
GLUCOSE SERPL-MCNC: 109 MG/DL (ref 65–99)
HBA1C MFR BLD: 6 % (ref 4.8–5.6)
HDLC SERPL-MCNC: 36 MG/DL
INTERPRETATION, 910389: NORMAL
LDLC SERPL CALC-MCNC: 107 MG/DL (ref 0–99)
POTASSIUM SERPL-SCNC: 5.1 MMOL/L (ref 3.5–5.2)
PROT SERPL-MCNC: 7.3 G/DL (ref 6–8.5)
SODIUM SERPL-SCNC: 144 MMOL/L (ref 134–144)
TRIGL SERPL-MCNC: 165 MG/DL (ref 0–149)
VLDLC SERPL CALC-MCNC: 33 MG/DL (ref 5–40)

## 2017-06-24 NOTE — PROGRESS NOTES
Deacon Santizo,  I have reviewed your results. Kidney and liver function normal  Cholesterol results ok, , goal to keep < 100. Triglyceride is up, please watch diet and limit fried food  Average blood sugar has improved from 6.4 to 6.0.  Keep good work  CPK elevated, but stable, so will continue with Simvastatin, same dose  Let me know if you have any question   thanks

## 2017-06-26 RX ORDER — SIMVASTATIN 10 MG/1
TABLET, FILM COATED ORAL
Qty: 90 TAB | Refills: 1 | Status: SHIPPED | OUTPATIENT
Start: 2017-06-26 | End: 2018-03-02 | Stop reason: SDUPTHER

## 2017-06-26 NOTE — TELEPHONE ENCOUNTER
Received faxed refill request for this medication from the pharmacy that is on file.     simvastatin (ZOCOR) 10 mg tablet  Normal, Disp-90 Tab, R-2

## 2017-08-24 ENCOUNTER — CLINICAL SUPPORT (OUTPATIENT)
Dept: CARDIOLOGY CLINIC | Age: 42
End: 2017-08-24

## 2017-08-24 ENCOUNTER — OFFICE VISIT (OUTPATIENT)
Dept: CARDIOLOGY CLINIC | Age: 42
End: 2017-08-24

## 2017-08-24 VITALS
DIASTOLIC BLOOD PRESSURE: 80 MMHG | BODY MASS INDEX: 29.95 KG/M2 | OXYGEN SATURATION: 99 % | WEIGHT: 197 LBS | SYSTOLIC BLOOD PRESSURE: 120 MMHG | HEART RATE: 72 BPM

## 2017-08-24 DIAGNOSIS — I49.8 BRUGADA SYNDROME: ICD-10-CM

## 2017-08-24 DIAGNOSIS — Z95.810 PRESENCE OF AUTOMATIC CARDIOVERTER/DEFIBRILLATOR (AICD): Primary | ICD-10-CM

## 2017-08-24 DIAGNOSIS — I47.1 PAT (PAROXYSMAL ATRIAL TACHYCARDIA) (HCC): ICD-10-CM

## 2017-08-24 DIAGNOSIS — E04.1 THYROID NODULE: ICD-10-CM

## 2017-08-24 DIAGNOSIS — Z95.810 ICD (IMPLANTABLE CARDIOVERTER-DEFIBRILLATOR) IN PLACE: Primary | ICD-10-CM

## 2017-08-24 RX ORDER — ASPIRIN 81 MG/1
81 TABLET ORAL DAILY
COMMUNITY

## 2017-08-24 NOTE — PROGRESS NOTES
Cardiac Electrophysiology Office Note     Subjective:      Whitney Khoury is a 39 y.o.man who has Brugada syndrome/syncope and ICD implantation in February 2015. He is here today for annual follow up. He had one episode of high V rate 145 bpm for 11 minutes 7/18/16. On device check today he has several episodes of SVT, he was asymptomatic. He reports he has been doing well. He denies palpitations, irregular heart beat, SOB, chest pain or LE edema. No syncope. He did have some episodes of \"fast heart rate\" that did not correlate with the device findings. He is exercising 4-5 days a week. He see dr Sterling Parker about his thyroid nodule and pre-DM. Hx of CPK elevation to just above 200 from the Zocor, Pravastatin and Zetia. There does not appear to be any change in the CPK level between the Pravastatin trial and the Zetia. The CPK was measured with the Zocor in the past but it was only on 10 mg once a day. The patient never had any myalgias or kidney failure that he knows of. Problem List  Date Reviewed: 8/24/2017          Codes Class Noted    Elevated CPK ICD-10-CM: R74.8  ICD-9-CM: 790.5  6/19/2017        Thyroid nodule ICD-10-CM: E04.1  ICD-9-CM: 241.0  10/21/2016        Prediabetes ICD-10-CM: R73.03  ICD-9-CM: 790.29  10/21/2016        S/P ICD (internal cardiac defibrillator) procedure ICD-10-CM: Z95.810  ICD-9-CM: V45.02  2/2/2015    Overview Signed 2/2/2015  7:26 PM by Axel Pyle MD     Single lead Biotronik ICD DX lead DFT < 25 J 2/2/2015             Syncope ICD-10-CM: R55  ICD-9-CM: 780.2  2/1/2015        Brugada syndrome ICD-10-CM: I49.8  ICD-9-CM: 746.89  2/1/2015        Tendonitis, Achilles ICD-10-CM: M76.60  ICD-9-CM: 726.71  8/14/2012        Hyperlipidemia LDL goal <100 ICD-10-CM: E78.5  ICD-9-CM: 272.4  3/11/2011              Current Outpatient Prescriptions   Medication Sig Dispense Refill    aspirin delayed-release 81 mg tablet Take  by mouth daily.       simvastatin (ZOCOR) 10 mg tablet TAKE 1 TABLET BY MOUTH EVERY EVENING 90 Tab 1    fexofenadine (ALLERGY RELIEF, FEXOFENADINE,) 180 mg tablet TAKE 1 TABLET BY MOUTH EVERY DAY 90 Tab 1    fish oil-omega-3 fatty acids 340-1,000 mg capsule Take 1 Cap by mouth daily.  cholecalciferol, vitamin D3, (VITAMIN D3) 2,000 unit tab Take  by mouth. Allergies   Allergen Reactions    Beef Containing Products Unknown (comments)     Episcopal preference    Pork/Porcine Containing Products Unknown (comments)     Episcopal preference     Past Medical History:   Diagnosis Date    High cholesterol     Hypercholesteremia     Seizures ([address]      Past Surgical History:   Procedure Laterality Date    INS PPM/ICD LED SING ONLY  2/2/2015          Family History   Problem Relation Age of Onset    Diabetes Father     Elevated Lipids Father     Hypertension Father     Heart Disease Father     Hypertension Mother     Elevated Lipids Brother     Hypertension Brother      Social History   Substance Use Topics    Smoking status: Never Smoker    Smokeless tobacco: Never Used    Alcohol use No        Review of Systems:   Constitutional: Negative for fever, chills, weight loss, malaise/fatigue. HEENT: Negative for nosebleeds, vision changes. Respiratory: Negative for cough, hemoptysis, sputum production, and wheezing. Cardiovascular: Negative for chest pain, palpitations, orthopnea, claudication, leg swelling, syncope, and PND. Gastrointestinal: Negative for nausea, vomiting, diarrhea, constipation, blood in stool and melena. Genitourinary: Negative for dysuria, and hematuria. Musculoskeletal: Negative for myalgias, arthralgia. Skin: Negative for rash. Heme: Does not bleed or bruise easily.    Neurological: Negative for speech change and focal weakness     Objective:     Visit Vitals    /80 (BP 1 Location: Right arm, BP Patient Position: Sitting)    Pulse 72    Wt 197 lb (89.4 kg)    SpO2 99%    BMI 29.95 kg/m2 Physical Exam:   Constitutional: well-developed and well-nourished. No distress. Head: Normocephalic and atraumatic. Eyes: Pupils are equal, round  Neck: supple. No JVD present. Cardiovascular: Normal rate, regular rhythm and normal heart sounds. Exam reveals no gallop and no friction rub. No murmur heard. Pulmonary/Chest: Effort normal and breath sounds normal. No wheezes. Abdominal: Soft, no tenderness. Musculoskeletal: no edema. Neurological: alert,oriented. Skin: Skin is warm and dry healed ICD  Psychiatric: normal mood and affect. Behavior is normal. Judgment and thought content normal.        ECG NSR Brugada pattern    Assessment/Plan:       ICD-10-CM ICD-9-CM    1. ICD (implantable cardioverter-defibrillator) in place Z95.810 V45.02 aspirin delayed-release 81 mg tablet      AMB POC EKG ROUTINE W/ 12 LEADS, INTER & REP   2. Brugada syndrome I49.8 746.89    3. Thyroid nodule E04.1 241.0    4. PAT (paroxysmal atrial tachycardia) ([facility]) I47.1 427.0      reviewed medications and side effects in detail  He is doing well, ICD check shows proper function, several episode of SVT probably atrial tachycardia, he was asymptomatic with the episode I pointed out. No syncope. Dr Marion Bailey is checking and managing his cholesterol. Dr Suyapa Adler monitors thyroid labs. Follow-up Disposition:  Return in about 1 year (around 8/24/2018). Check Biotronik ICD remotely every 3 months      Thank you for involving me in this patient's care and please call with further concerns or questions. Dayron Ahuja M.D.   Electrophysiology/Cardiology  [facility] [place]  [address], [address]  [facility], [address]                             [address] [place] [address]  [phone]

## 2017-08-24 NOTE — MR AVS SNAPSHOT
Visit Information Date & Time Provider Department Dept. Phone Encounter #  
 8/24/2017  3:00 PM Isa Santo MD [facility] [phone] [id number] Your Appointments 6/1/2018  9:30 AM  
ROUTINE CARE with Juan Barboza MD  
[facility]) Appt Note: f/u diabetes cp0.00  
 [address]  [address] [address]  
[address] [employer] [address]  
  
    
 6/26/2018  2:45 PM  
PACEMAKER with Bere Ramos [facility] ([facility] SCHEDULING) Appt Note: bio icd, rc; bio icd, rc annual thresh, HM, see [facility] (has to be in [place] or July because travels) [address]  [address] [place]  
[phone]  
  
   
 [address]  [address]  
  
    
 6/26/2018  3:00 PM  
ESTABLISHED PATIENT with Isa Santo MD  
[facility] ([facility]) Appt Note: bio icd, rc annual thresh, HM, see [facility] (has to be in [place] or July because travels) [address] [address]  
[address] [employer] [address] [address]  
  
    
  
 11/29/2017  9:15 AM  
REMOTE OFFICE VISIT with Gavino Melgoza [facility] ([facility] SCHEDULING) Appt Note: bio icd, rc b 8/24/17  
 [address] [employer] [address] [address]  
[address] [employer] [address] [address]  
  
    
 3/7/2018  9:30 AM  
REMOTE OFFICE VISIT with Gavino Melgoza [facility] ([facility] SCHEDULING) Appt Note: bio icd, rc,  
 [address]  
[phone] Upcoming Health Maintenance Date Due INFLUENZA AGE 9 TO ADULT 8/1/2017 DTaP/Tdap/Td series (2 - Td) 11/7/2026 Allergies as of 8/24/2017  Review Complete On: 8/24/2017 By: Isa Santo MD  
  
 Severity Noted Reaction Type Reactions Beef Containing Products  02/02/2015    Unknown (comments) Restorationist preference Pork/porcine Containing Products  02/02/2015    Unknown (comments) Restorationist preference Current Immunizations  Reviewed on 2/3/2015 No immunizations on file. Not reviewed this visit You Were Diagnosed With   
  
 Codes Comments S/P ICD (internal cardiac defibrillator) procedure    -  Primary ICD-10-CM: Z95.810 ICD-9-CM: V45.02 Brugada syndrome     ICD-10-CM: I49.8 ICD-9-CM: 746.89 Thyroid nodule     ICD-10-CM: E04.1 ICD-9-CM: 241.0 Vitals BP Pulse Weight(growth percentile) SpO2 BMI Smoking Status 120/80 (BP 1 Location: Right arm, BP Patient Position: Sitting) 72 197 lb (89.4 kg) 99% 29.95 kg/m2 Never Smoker Vitals History BMI and BSA Data Body Mass Index Body Surface Area  
 29.95 kg/m 2 2.07 m 2 Preferred Pharmacy Pharmacy Name Phone [id number]- [place], [place] - [address] [phone] Your Updated Medication List  
  
   
This list is accurate as of: 8/24/17  3:38 PM.  Always use your most recent med list.  
  
  
  
  
 aspirin delayed-release 81 mg tablet Take  by mouth daily. fexofenadine 180 mg tablet Commonly known as:  ALLERGY RELIEF (FEXOFENADINE) TAKE 1 TABLET BY MOUTH EVERY DAY  
  
 fish oil-omega-3 fatty acids 340-1,000 mg capsule Take 1 Cap by mouth daily. simvastatin 10 mg tablet Commonly known as:  ZOCOR  
TAKE 1 TABLET BY MOUTH EVERY EVENING  
  
 VITAMIN D3 2,000 unit Tab Generic drug:  cholecalciferol (vitamin D3) Take  by mouth. We Performed the Following AMB POC EKG ROUTINE W/ 12 LEADS, INTER & REP [51837 CPT(R)] Patient Instructions You will need to follow up in clinic with Dr. Jaycob Ennis in 1 year. Introducing [facility]!    
 Dear Gilberto Maciel: 
 Thank you for requesting a [facility] account. Our records indicate that you already have an active [facility] account. You can access your account anytime at [email or web address] Did you know that you can access your hospital and ER discharge instructions at any time in [facility]? You can also review all of your test results from your hospital stay or ER visit. Additional Information If you have questions, please visit the Frequently Asked Questions section of the [facility] website at [email or web address] Remember, [facility] is NOT to be used for urgent needs. For medical emergencies, dial 911. Now available from your iPhone and Android! Please provide this summary of care documentation to your next provider. Your primary care clinician is listed as Valentino Arts. If you have any questions after today's visit, please call [phone].

## 2017-08-24 NOTE — PROGRESS NOTES
Cardiac Electrophysiology Office Note     Subjective:      Vaughn Aviles is a 39 y.o.man who has Brugada syndrome and ICD implantation in February 2015. He is here today for annual follow up. He had one episode of high V rate 145 bpm for 11 minutes 7/18/16. On device check today he has several episodes of SVT, he was asymptomatic. He reports he has been doing well. He denies palpitations, irregular heart beat, SOB, chest pain or LE edema. No syncope. He did have some episodes of \"fast heart rate\" that did not correlate with the device findings. He is exercising 4-5 days a week. He see dr Olga Mcdonnell about his thyroid nodule and pre-DM. Hx of CPK elevation to just above 200 from the Zocor, Pravastatin and Zetia. There does not appear to be any change in the CPK level between the Pravastatin trial and the Zetia. The CPK was measured with the Zocor in the past but it was only on 10 mg once a day. The patient never had any myalgias or kidney failure that he knows of. Problem List  Date Reviewed: 6/23/2017          Codes Class Noted    Elevated CPK ICD-10-CM: R74.8  ICD-9-CM: 790.5  6/19/2017        Thyroid nodule ICD-10-CM: E04.1  ICD-9-CM: 241.0  10/21/2016        Prediabetes ICD-10-CM: R73.03  ICD-9-CM: 790.29  10/21/2016        S/P ICD (internal cardiac defibrillator) procedure ICD-10-CM: Z95.810  ICD-9-CM: V45.02  2/2/2015    Overview Signed 2/2/2015  7:26 PM by Leonarda Haines MD     Single lead Biotronik ICD DX lead DFT < 25 J 2/2/2015             Syncope ICD-10-CM: R55  ICD-9-CM: 780.2  2/1/2015        Brugada syndrome ICD-10-CM: I49.8  ICD-9-CM: 746.89  2/1/2015        Tendonitis, Achilles ICD-10-CM: M76.60  ICD-9-CM: 726.71  8/14/2012        Hyperlipidemia LDL goal <100 ICD-10-CM: E78.5  ICD-9-CM: 272.4  3/11/2011              Current Outpatient Prescriptions   Medication Sig Dispense Refill    aspirin delayed-release 81 mg tablet Take  by mouth daily.       simvastatin (ZOCOR) 10 mg tablet TAKE 1 TABLET BY MOUTH EVERY EVENING 90 Tab 1    fexofenadine (ALLERGY RELIEF, FEXOFENADINE,) 180 mg tablet TAKE 1 TABLET BY MOUTH EVERY DAY 90 Tab 1    fish oil-omega-3 fatty acids 340-1,000 mg capsule Take 1 Cap by mouth daily.  cholecalciferol, vitamin D3, (VITAMIN D3) 2,000 unit tab Take  by mouth. Allergies   Allergen Reactions    Beef Containing Products Unknown (comments)     Islam preference    Pork/Porcine Containing Products Unknown (comments)     Islam preference     Past Medical History:   Diagnosis Date    High cholesterol     Hypercholesteremia     Seizures ([address]      Past Surgical History:   Procedure Laterality Date    INS PPM/ICD LED SING ONLY  2/2/2015          Family History   Problem Relation Age of Onset    Diabetes Father     Elevated Lipids Father     Hypertension Father     Heart Disease Father     Hypertension Mother     Elevated Lipids Brother     Hypertension Brother      Social History   Substance Use Topics    Smoking status: Never Smoker    Smokeless tobacco: Never Used    Alcohol use No        Review of Systems:   Constitutional: Negative for fever, chills, weight loss, malaise/fatigue. HEENT: Negative for nosebleeds, vision changes. Respiratory: Negative for cough, hemoptysis, sputum production, and wheezing. Cardiovascular: Negative for chest pain, palpitations, orthopnea, claudication, leg swelling, syncope, and PND. Gastrointestinal: Negative for nausea, vomiting, diarrhea, constipation, blood in stool and melena. Genitourinary: Negative for dysuria, and hematuria. Musculoskeletal: Negative for myalgias, arthralgia. Skin: Negative for rash. Heme: Does not bleed or bruise easily.    Neurological: Negative for speech change and focal weakness     Objective:     Visit Vitals    /80 (BP 1 Location: Right arm, BP Patient Position: Sitting)    Pulse 72    Wt 197 lb (89.4 kg)    SpO2 99%    BMI 29.95 kg/m2      Physical Exam:   Constitutional: well-developed and well-nourished. No distress. Head: Normocephalic and atraumatic. Eyes: Pupils are equal, round  Neck: supple. No JVD present. + enlarged thyroid  Cardiovascular: Normal rate, regular rhythm and normal heart sounds. Exam reveals no gallop and no friction rub. No murmur heard. Pulmonary/Chest: Effort normal and breath sounds normal. No wheezes. Abdominal: Soft, no tenderness. Musculoskeletal: no edema. Neurological: alert,oriented. Skin: Skin is warm and dry healed ICD  Psychiatric: normal mood and affect. Behavior is normal. Judgment and thought content normal.          Assessment/Plan:       ICD-10-CM ICD-9-CM    1. S/P ICD (internal cardiac defibrillator) procedure Z95.810 V45.02 aspirin delayed-release 81 mg tablet      AMB POC EKG ROUTINE W/ 12 LEADS, INTER & REP   2. Brugada syndrome I49.8 746.89    3. Thyroid nodule E04.1 241.0      reviewed medications and side effects in detail  He is doing well, ICD check shows proper function, several episode of SVT probably atrial tachycardia, he was asymptomatic. No syncope. Dr Tarah Sheets is checking and managing his cholesterol. Dr Rodney Lizarraga monitor thyroid. Follow-up Disposition: Not on Filefollow up with dr Donnell Thurman in 1 year       Thank you for involving me in this patient's care and please call with further concerns or questions. Lucila Whiting M.D.   Electrophysiology/Cardiology  [facility]  [address] , [facility] [address]  [phone]

## 2017-11-29 ENCOUNTER — OFFICE VISIT (OUTPATIENT)
Dept: CARDIOLOGY CLINIC | Age: 42
End: 2017-11-29

## 2017-11-29 DIAGNOSIS — Z95.810 PRESENCE OF AUTOMATIC CARDIOVERTER/DEFIBRILLATOR (AICD): Primary | ICD-10-CM

## 2017-12-04 ENCOUNTER — TELEPHONE (OUTPATIENT)
Dept: FAMILY MEDICINE CLINIC | Age: 42
End: 2017-12-04

## 2017-12-04 DIAGNOSIS — Z00.00 ROUTINE GENERAL MEDICAL EXAMINATION AT A HEALTH CARE FACILITY: Primary | ICD-10-CM

## 2017-12-04 DIAGNOSIS — R74.8 ELEVATED CPK: ICD-10-CM

## 2017-12-04 NOTE — TELEPHONE ENCOUNTER
Outgoing call to patient.  verified. Patient requesting 6 month follow up. appt scheduled. Patient is requesting to have labs done prior so they can be discussed at appt. Requesting to have forms mailed to him so he can go to [facility] instead of lab here.

## 2017-12-04 NOTE — TELEPHONE ENCOUNTER
----- Message -----         From: Tayla Villaseñor        Sent: 12/1/2017  11:27 PM          [facility]  [facility]     Subject: Appointment Request                                    Appointment Request From: Tayla Villaseñor          With Provider: Roldan Roblero MD [-Primary Care Physician-]          Preferred Date Range: From 12/1/2017 To 12/31/2017          Preferred Times: Any          Reason for visit: Routine Follow Up          Comments:     I would like to schedule a 6 month routine check up

## 2017-12-13 ENCOUNTER — LAB ONLY (OUTPATIENT)
Dept: FAMILY MEDICINE CLINIC | Age: 42
End: 2017-12-13

## 2017-12-13 DIAGNOSIS — Z00.00 ROUTINE GENERAL MEDICAL EXAMINATION AT A HEALTH CARE FACILITY: ICD-10-CM

## 2017-12-13 DIAGNOSIS — R74.8 INCREASED CPK LEVEL: Primary | ICD-10-CM

## 2017-12-14 LAB
ALBUMIN SERPL-MCNC: 4.7 G/DL (ref 3.5–5.5)
ALBUMIN/GLOB SERPL: 1.7 {RATIO} (ref 1.2–2.2)
ALP SERPL-CCNC: 72 IU/L (ref 39–117)
ALT SERPL-CCNC: 41 IU/L (ref 0–44)
APPEARANCE UR: CLEAR
AST SERPL-CCNC: 28 IU/L (ref 0–40)
BASOPHILS # BLD AUTO: 0 X10E3/UL (ref 0–0.2)
BASOPHILS NFR BLD AUTO: 0 %
BILIRUB SERPL-MCNC: 0.9 MG/DL (ref 0–1.2)
BILIRUB UR QL STRIP: NEGATIVE
BUN SERPL-MCNC: 14 MG/DL (ref 6–24)
BUN/CREAT SERPL: 12 (ref 9–20)
CALCIUM SERPL-MCNC: 10 MG/DL (ref 8.7–10.2)
CHLORIDE SERPL-SCNC: 101 MMOL/L (ref 96–106)
CHOLEST SERPL-MCNC: 182 MG/DL (ref 100–199)
CK SERPL-CCNC: 315 U/L (ref 24–204)
CO2 SERPL-SCNC: 26 MMOL/L (ref 18–29)
COLOR UR: YELLOW
CREAT SERPL-MCNC: 1.18 MG/DL (ref 0.76–1.27)
EOSINOPHIL # BLD AUTO: 0.2 X10E3/UL (ref 0–0.4)
EOSINOPHIL NFR BLD AUTO: 2 %
ERYTHROCYTE [DISTWIDTH] IN BLOOD BY AUTOMATED COUNT: 14.2 % (ref 12.3–15.4)
EST. AVERAGE GLUCOSE BLD GHB EST-MCNC: 131 MG/DL
GFR SERPLBLD CREATININE-BSD FMLA CKD-EPI: 76 ML/MIN/1.73
GFR SERPLBLD CREATININE-BSD FMLA CKD-EPI: 87 ML/MIN/1.73
GLOBULIN SER CALC-MCNC: 2.8 G/DL (ref 1.5–4.5)
GLUCOSE SERPL-MCNC: 108 MG/DL (ref 65–99)
GLUCOSE UR QL: NEGATIVE
HBA1C MFR BLD: 6.2 % (ref 4.8–5.6)
HCT VFR BLD AUTO: 45.4 % (ref 37.5–51)
HDLC SERPL-MCNC: 39 MG/DL
HGB BLD-MCNC: 14.5 G/DL (ref 13–17.7)
HGB UR QL STRIP: NEGATIVE
IMM GRANULOCYTES # BLD: 0 X10E3/UL (ref 0–0.1)
IMM GRANULOCYTES NFR BLD: 0 %
INTERPRETATION, 910389: NORMAL
KETONES UR QL STRIP: NEGATIVE
LDLC SERPL CALC-MCNC: 113 MG/DL (ref 0–99)
LEUKOCYTE ESTERASE UR QL STRIP: NEGATIVE
LYMPHOCYTES # BLD AUTO: 2.7 X10E3/UL (ref 0.7–3.1)
LYMPHOCYTES NFR BLD AUTO: 33 %
MCH RBC QN AUTO: 28.1 PG (ref 26.6–33)
MCHC RBC AUTO-ENTMCNC: 31.9 G/DL (ref 31.5–35.7)
MCV RBC AUTO: 88 FL (ref 79–97)
MICRO URNS: NORMAL
MONOCYTES # BLD AUTO: 0.5 X10E3/UL (ref 0.1–0.9)
MONOCYTES NFR BLD AUTO: 6 %
NEUTROPHILS # BLD AUTO: 4.8 X10E3/UL (ref 1.4–7)
NEUTROPHILS NFR BLD AUTO: 59 %
NITRITE UR QL STRIP: NEGATIVE
PH UR STRIP: 5 [PH] (ref 5–7.5)
PLATELET # BLD AUTO: 349 X10E3/UL (ref 150–379)
POTASSIUM SERPL-SCNC: 4.4 MMOL/L (ref 3.5–5.2)
PROT SERPL-MCNC: 7.5 G/DL (ref 6–8.5)
PROT UR QL STRIP: NEGATIVE
RBC # BLD AUTO: 5.16 X10E6/UL (ref 4.14–5.8)
SODIUM SERPL-SCNC: 140 MMOL/L (ref 134–144)
SP GR UR: 1.03 (ref 1–1.03)
TRIGL SERPL-MCNC: 152 MG/DL (ref 0–149)
TSH SERPL DL<=0.005 MIU/L-ACNC: 1.68 UIU/ML (ref 0.45–4.5)
UROBILINOGEN UR STRIP-MCNC: 0.2 MG/DL (ref 0.2–1)
VLDLC SERPL CALC-MCNC: 30 MG/DL (ref 5–40)
WBC # BLD AUTO: 8.2 X10E3/UL (ref 3.4–10.8)

## 2017-12-14 NOTE — PROGRESS NOTES
Deacon Santizo, your results are here. We will discuss in detail when you come for follow up.   thanks

## 2018-01-22 ENCOUNTER — OFFICE VISIT (OUTPATIENT)
Dept: FAMILY MEDICINE CLINIC | Age: 43
End: 2018-01-22

## 2018-01-22 VITALS
DIASTOLIC BLOOD PRESSURE: 84 MMHG | RESPIRATION RATE: 16 BRPM | TEMPERATURE: 98 F | WEIGHT: 196 LBS | HEIGHT: 68 IN | OXYGEN SATURATION: 98 % | HEART RATE: 78 BPM | SYSTOLIC BLOOD PRESSURE: 120 MMHG | BODY MASS INDEX: 29.7 KG/M2

## 2018-01-22 DIAGNOSIS — Z00.00 ROUTINE GENERAL MEDICAL EXAMINATION AT A HEALTH CARE FACILITY: Primary | ICD-10-CM

## 2018-01-22 DIAGNOSIS — E66.3 OVER WEIGHT: ICD-10-CM

## 2018-01-22 NOTE — PROGRESS NOTES
HISTORY OF PRESENT ILLNESS  Carmenza Acosta is a 43 y.o. male. He was seen for complete physical.  Blood work was done prior to this visit. Discussed results in detail. HPI  Health Maintenance  Immunizations:    Influenza: he declined. Tetanus: up to date. Gardasil: n/a. Cancer screening:    Reviewed testicular, prostate, and colon cancer screening guidelines. Patient Care Team:  Primitivo Montano MD as PCP - General (Family Practice)  Denver Saxon, MD as Physician (Cardiology)  Adrian Esteves RN as [facility] ([facility]iology)  Emilia Mcguire NP (Nurse Practitioner)  Jael Matta MD as Consulting Provider (Endocrinology)  Vinnie Rinne, MD (Cardiology)       The following sections were reviewed & updated as appropriate: PMH, PSH, FH, and SH. Patient Active Problem List   Diagnosis Code    Hyperlipidemia LDL goal <100 E78.5    Brugada syndrome I49.8    S/P ICD (internal cardiac defibrillator) procedure Z95.810    Thyroid nodule E04.1    Prediabetes R73.03    Elevated CPK R74.8          Prior to Admission medications    Medication Sig Start Date End Date Taking? Authorizing Provider   aspirin delayed-release 81 mg tablet Take  by mouth daily. Yes Historical Provider   simvastatin (ZOCOR) 10 mg tablet TAKE 1 TABLET BY MOUTH EVERY EVENING 6/26/17  Yes Primitivo Montano MD   fexofenadine (ALLERGY RELIEF, FEXOFENADINE,) 180 mg tablet TAKE 1 TABLET BY MOUTH EVERY DAY 6/5/17  Yes Primitivo Montano MD   fish oil-omega-3 fatty acids 340-1,000 mg capsule Take 1 Cap by mouth daily. Yes Historical Provider   cholecalciferol, vitamin D3, (VITAMIN D3) 2,000 unit tab Take  by mouth. Yes Historical Provider          No Known Allergies   Review of Systems   Constitutional: Positive for malaise/fatigue. Negative for chills and fever. HENT: Positive for congestion. Negative for ear pain, sore throat and tinnitus. Eyes: Negative for blurred vision, double vision, pain and discharge.    Respiratory: Negative for cough, shortness of breath and wheezing. Cardiovascular: Negative for chest pain, palpitations and leg swelling. Gastrointestinal: Negative for abdominal pain, blood in stool, constipation, diarrhea, nausea and vomiting. Genitourinary: Negative for dysuria, frequency, hematuria and urgency. Musculoskeletal: Negative for back pain, joint pain and myalgias. Skin: Negative for rash. Neurological: Negative for dizziness, tremors, seizures and headaches. Endo/Heme/Allergies: Negative for polydipsia. Does not bruise/bleed easily. Psychiatric/Behavioral: Negative for depression and substance abuse. The patient is not nervous/anxious. Physical Exam   Constitutional: He is oriented to person, place, and time. He appears well-developed and well-nourished. HENT:   Head: Normocephalic and atraumatic. Right Ear: External ear normal.   Left Ear: External ear normal.   Nose: Nose normal.   Mouth/Throat: Oropharynx is clear and moist. No oropharyngeal exudate. Both TM dull   Eyes: Conjunctivae and EOM are normal. Pupils are equal, round, and reactive to light. Neck: Normal range of motion. Neck supple. No thyromegaly present. Cardiovascular: Normal rate, regular rhythm, normal heart sounds and intact distal pulses. No murmur heard. Pulmonary/Chest: Effort normal and breath sounds normal. No respiratory distress. He has no wheezes. Abdominal: Soft. Bowel sounds are normal. He exhibits no distension and no mass. Genitourinary: Testes normal and penis normal. Right testis shows no mass. Left testis shows no mass. Musculoskeletal: Normal range of motion. He exhibits no edema or tenderness. Neurological: He is alert and oriented to person, place, and time. He has normal strength and normal reflexes. No sensory deficit. Cranial nerves 2-12 normal   Skin: Skin is warm and dry. No rash noted. Psychiatric: He has a normal mood and affect.  His behavior is normal. Thought content normal.   Nursing note and vitals reviewed. ASSESSMENT and PLAN  Diagnoses and all orders for this visit:    1. Routine general medical examination at a health care facility    2. Over weight    Discussed abnormal weight, ideal BMI and importance of diet and exercise to loose weight. Referral for exercise program was offered. Printed information about diet and lifestyle modification provided. Discussed lifestyle issues and health guidance given  Patient was given an after visit summary which includes diagnoses, vital signs, current medications, instructions and references & authorized prescriptions . Pt verbalized instructions I provided and expressed understanding of discussion that was held today. Follow-up Disposition:  Return in about 6 months (around 7/22/2018) for fasting, follow up, CHO.

## 2018-01-22 NOTE — PROGRESS NOTES
Chief Complaint   Patient presents with    Cholesterol Problem     Follow up, feeling some fatigue     Blood sugar problem     Prediabetes     1. Have you been to the ER, urgent care clinic since your last visit? Hospitalized since your last visit? No    2. Have you seen or consulted any other health care providers outside of the [place] [address] since your last visit? Include any pap smears or colon screening.  No

## 2018-01-22 NOTE — PATIENT INSTRUCTIONS

## 2018-01-22 NOTE — MR AVS SNAPSHOT
[facility] 
 
 
 [facility] [address] [place] [address] 
[phone] Patient: Aston Kay MRN: [id number] MBO:[id number] Visit Information Date & Time Provider Department Dept. Phone Encounter #  
 1/22/2018  3:40 PM Nel Martin [facility] [phone] [id number] Follow-up Instructions Return in about 6 months (around 7/22/2018) for fasting, follow up, CHO. Your Appointments 6/1/2018  9:30 AM  
ROUTINE CARE with Jenniffer Soto MD  
[facility] [address] [facility]) Appt Note: f/u diabetes cp0.00  
 [address] [address]  
[address] [facility] [address]  
  
    
 6/26/2018  2:45 PM  
PACEMAKER with Darryl Osullivan [facility] ([facility] SCHEDULING) Appt Note: bio icd, rc; bio icd, rc annual thresh, HM, see Zari Perish (has to be in [place] or July because travels) [address] [place]  
[phone]  
  
   
 [address]  
  
    
 6/26/2018  3:00 PM  
ESTABLISHED PATIENT with Nigel Haji MD  
[facility] ([address]) Appt Note: bio icd, rc annual thresh, HM, see Zari Perish (has to be in [place] or July because travels) [address] [address]  
[address] [facility] [address]  
  
    
  
 3/7/2018  9:30 AM  
REMOTE OFFICE VISIT with Annette Gant [facility] ([facility] SCHEDULING) Appt Note: bio icd, rc,  
 [address] [facility] [address]  
[address] [address] Upcoming Health Maintenance Date Due DTaP/Tdap/Td series (2 - Td) 11/7/2026 Allergies as of 1/22/2018  Review Complete On: 1/22/2018 By: Sherri Porter MD  
 No Known Allergies Current Immunizations  Reviewed on 2/3/2015 No immunizations on file. Not reviewed this visit You Were Diagnosed With   
  
 Codes Comments Routine general medical examination at a health care facility    -  Primary ICD-10-CM: Z00.00 ICD-9-CM: V70.0 Over weight     ICD-10-CM: N02.9 ICD-9-CM: 278.02 Vitals BP Pulse Temp Resp Height(growth percentile) Weight(growth percentile) 120/84 (BP 1 Location: Right arm, BP Patient Position: Sitting) 78 98 °F (36.7 °C) (Oral) 16 5' 8\" (1.727 m) 196 lb (88.9 kg) SpO2 BMI Smoking Status 98% 29.8 kg/m2 Never Smoker Vitals History BMI and BSA Data Body Mass Index Body Surface Area  
 29.8 kg/m 2 2.07 m 2 Preferred Pharmacy Pharmacy Name Phone [id number] [place] [id number] [phone] Your Updated Medication List  
  
   
This list is accurate as of: 1/22/18  4:13 PM.  Always use your most recent med list.  
  
  
  
  
 aspirin delayed-release 81 mg tablet Take  by mouth daily. fexofenadine 180 mg tablet Commonly known as:  ALLERGY RELIEF (FEXOFENADINE) TAKE 1 TABLET BY MOUTH EVERY DAY  
  
 fish oil-omega-3 fatty acids 340-1,000 mg capsule Take 1 Cap by mouth daily. simvastatin 10 mg tablet Commonly known as:  ZOCOR  
TAKE 1 TABLET BY MOUTH EVERY EVENING  
  
 VITAMIN D3 2,000 unit Tab Generic drug:  cholecalciferol (vitamin D3) Take  by mouth. Follow-up Instructions Return in about 6 months (around 7/22/2018) for fasting, follow up, CHO. Patient Instructions Well Visit, Ages 25 to 48: Care Instructions Your Care Instructions Physical exams can help you stay healthy. Your doctor has checked your overall health and may have suggested ways to take good care of yourself. He or she also may have recommended tests.  At home, you can help prevent illness with healthy eating, regular exercise, and other steps. Follow-up care is a key part of your treatment and safety. Be sure to make and go to all appointments, and call your doctor if you are having problems. It's also a good idea to know your test results and keep a list of the medicines you take. How can you care for yourself at home? · Reach and stay at a healthy weight. This will lower your risk for many problems, such as obesity, diabetes, heart disease, and high blood pressure. · Get at least 30 minutes of physical activity on most days of the week. Walking is a good choice. You also may want to do other activities, such as running, swimming, cycling, or playing tennis or team sports. Discuss any changes in your exercise program with your doctor. · Do not smoke or allow others to smoke around you. If you need help quitting, talk to your doctor about stop-smoking programs and medicines. These can increase your chances of quitting for good. · Talk to your doctor about whether you have any risk factors for sexually transmitted infections (STIs). Having one sex partner (who does not have STIs and does not have sex with anyone else) is a good way to avoid these infections. · Use birth control if you do not want to have children at this time. Talk with your doctor about the choices available and what might be best for you. · Protect your skin from too much sun. When you're outdoors from 10 a.m. to 4 p.m., stay in the shade or cover up with clothing and a hat with a wide brim. Wear sunglasses that block UV rays. Even when it's cloudy, put broad-spectrum sunscreen (SPF 30 or higher) on any exposed skin. · See a dentist one or two times a year for checkups and to have your teeth cleaned. · Wear a seat belt in the car. · Drink alcohol in moderation, if at all. That means no more than 2 drinks a day for men and 1 drink a day for women. Follow your doctor's advice about when to have certain tests. These tests can spot problems early. For everyone · Cholesterol. Have the fat (cholesterol) in your blood tested after age 21. Your doctor will tell you how often to have this done based on your age, family history, or other things that can increase your risk for heart disease. · Blood pressure. Have your blood pressure checked during a routine doctor visit. Your doctor will tell you how often to check your blood pressure based on your age, your blood pressure results, and other factors. · Vision. Talk with your doctor about how often to have a glaucoma test. 
· Diabetes. Ask your doctor whether you should have tests for diabetes. · Colon cancer. Have a test for colon cancer at age 48. You may have one of several tests. If you are younger than 48, you may need a test earlier if you have any risk factors. Risk factors include whether you already had a precancerous polyp removed from your colon or whether your parent, brother, sister, or child has had colon cancer. For women · Breast exam and mammogram. Talk to your doctor about when you should have a clinical breast exam and a mammogram. Medical experts differ on whether and how often women under 50 should have these tests. Your doctor can help you decide what is right for you. · Pap test and pelvic exam. Begin Pap tests at age 24. A Pap test is the best way to find cervical cancer. The test often is part of a pelvic exam. Ask how often to have this test. 
· Tests for sexually transmitted infections (STIs). Ask whether you should have tests for STIs. You may be at risk if you have sex with more than one person, especially if your partners do not wear condoms. For men · Tests for sexually transmitted infections (STIs). Ask whether you should have tests for STIs. You may be at risk if you have sex with more than one person, especially if you do not wear a condom. · Testicular cancer exam. Ask your doctor whether you should check your testicles regularly. · Prostate exam. Talk to your doctor about whether you should have a blood test (called a PSA test) for prostate cancer. Experts differ on whether and when men should have this test. Some experts suggest it if you are older than 39 and are -American or have a father or brother who got prostate cancer when he was younger than 72. When should you call for help? Watch closely for changes in your health, and be sure to contact your doctor if you have any problems or symptoms that concern you. Where can you learn more? Go to http://joanne-magali.info/. Enter P072 in the search box to learn more about \"Well Visit, Ages 25 to 48: Care Instructions. \" Current as of: May 12, 2017 Content Version: 11.4 © 6260-9863 [employer]. Care instructions adapted under license by [employer] (which disclaims liability or warranty for this information). If you have questions about a medical condition or this instruction, always ask your healthcare professional. [address] any warranty or liability for your use of this information. Introducing Noble! Dear Margaret Waldron: Thank you for requesting a Unbabel account. Our records indicate that you already have an active Unbabel account. You can access your account anytime at [email or web address] Did you know that you can access your hospital and ER discharge instructions at any time in Unbabel? You can also review all of your test results from your hospital stay or ER visit. Additional Information If you have questions, please visit the Frequently Asked Questions section of the Unbabel website at [email or web address] Remember, Unbabel is NOT to be used for urgent needs. For medical emergencies, dial 911. Now available from your iPhone and Android! Please provide this summary of care documentation to your next provider. Your primary care clinician is listed as Alma Anderson. If you have any questions after today's visit, please call [phone].

## 2018-03-02 RX ORDER — SIMVASTATIN 10 MG/1
TABLET, FILM COATED ORAL
Qty: 90 TAB | Refills: 1 | Status: SHIPPED | OUTPATIENT
Start: 2018-03-02 | End: 2018-08-29 | Stop reason: SDUPTHER

## 2018-03-07 ENCOUNTER — OFFICE VISIT (OUTPATIENT)
Dept: CARDIOLOGY CLINIC | Age: 43
End: 2018-03-07

## 2018-03-07 DIAGNOSIS — Z95.810 PRESENCE OF AUTOMATIC CARDIOVERTER/DEFIBRILLATOR (AICD): Primary | ICD-10-CM

## 2018-04-11 RX ORDER — MINERAL OIL
ENEMA (ML) RECTAL
Qty: 90 TAB | Refills: 1 | Status: SHIPPED | OUTPATIENT
Start: 2018-04-11 | End: 2019-01-18

## 2018-06-22 ENCOUNTER — TELEPHONE (OUTPATIENT)
Dept: CARDIOLOGY CLINIC | Age: 43
End: 2018-06-22

## 2018-06-22 NOTE — TELEPHONE ENCOUNTER
Patient would like a cll back to reset his pacemaker and Dr Charmayne Angst appointment. Patient would like to reschedule in the next few weeks.   Phone [phone]  Kiesha Diaz

## 2018-06-22 NOTE — TELEPHONE ENCOUNTER
Verified patient with two types of identifiers. Patient re-scheduled for annual check with Dr. Amarilis Carrion and device clinic. Patient verbalized understanding and will call with any other questions.

## 2018-07-07 ENCOUNTER — PATIENT MESSAGE (OUTPATIENT)
Dept: FAMILY MEDICINE CLINIC | Age: 43
End: 2018-07-07

## 2018-07-07 DIAGNOSIS — R73.03 PREDIABETES: ICD-10-CM

## 2018-07-07 DIAGNOSIS — E78.5 HYPERLIPIDEMIA LDL GOAL <100: Primary | ICD-10-CM

## 2018-07-07 DIAGNOSIS — R74.8 ELEVATED CPK: ICD-10-CM

## 2018-07-09 NOTE — TELEPHONE ENCOUNTER
From: Gabriel Aguilar  To: Jerri Reyez MD  Sent: 7/7/2018 6:23 PM EDT  Subject: Non-Urgent Medical Question    I would like to do my blood work and follow up which I is due in July , can you please send me the lab forms to do check of CK, Cholesterol and Sugar ( A1C) and help me schedule an appointment for a follow up? Your staff can be reach me @[phone] for the appointment .

## 2018-07-21 ENCOUNTER — LAB ONLY (OUTPATIENT)
Dept: FAMILY MEDICINE CLINIC | Age: 43
End: 2018-07-21

## 2018-07-21 DIAGNOSIS — E78.5 HYPERLIPIDEMIA LDL GOAL <100: ICD-10-CM

## 2018-07-21 DIAGNOSIS — R74.8 ELEVATED CPK: ICD-10-CM

## 2018-07-21 DIAGNOSIS — R73.03 PREDIABETES: ICD-10-CM

## 2018-07-22 LAB
ALBUMIN SERPL-MCNC: 4.3 G/DL (ref 3.5–5.5)
ALBUMIN/GLOB SERPL: 1.7 {RATIO} (ref 1.2–2.2)
ALP SERPL-CCNC: 66 IU/L (ref 39–117)
ALT SERPL-CCNC: 32 IU/L (ref 0–44)
AST SERPL-CCNC: 28 IU/L (ref 0–40)
BILIRUB SERPL-MCNC: 0.6 MG/DL (ref 0–1.2)
BUN SERPL-MCNC: 15 MG/DL (ref 6–24)
BUN/CREAT SERPL: 14 (ref 9–20)
CALCIUM SERPL-MCNC: 9.6 MG/DL (ref 8.7–10.2)
CHLORIDE SERPL-SCNC: 100 MMOL/L (ref 96–106)
CHOLEST SERPL-MCNC: 208 MG/DL (ref 100–199)
CK SERPL-CCNC: 289 U/L (ref 24–204)
CO2 SERPL-SCNC: 23 MMOL/L (ref 20–29)
CREAT SERPL-MCNC: 1.1 MG/DL (ref 0.76–1.27)
EST. AVERAGE GLUCOSE BLD GHB EST-MCNC: 128 MG/DL
GLOBULIN SER CALC-MCNC: 2.6 G/DL (ref 1.5–4.5)
GLUCOSE SERPL-MCNC: 103 MG/DL (ref 65–99)
HBA1C MFR BLD: 6.1 % (ref 4.8–5.6)
HDLC SERPL-MCNC: 41 MG/DL
INTERPRETATION, 910389: NORMAL
LDLC SERPL CALC-MCNC: 144 MG/DL (ref 0–99)
POTASSIUM SERPL-SCNC: 4.7 MMOL/L (ref 3.5–5.2)
PROT SERPL-MCNC: 6.9 G/DL (ref 6–8.5)
SODIUM SERPL-SCNC: 140 MMOL/L (ref 134–144)
TRIGL SERPL-MCNC: 116 MG/DL (ref 0–149)
VLDLC SERPL CALC-MCNC: 23 MG/DL (ref 5–40)

## 2018-07-31 ENCOUNTER — CLINICAL SUPPORT (OUTPATIENT)
Dept: CARDIOLOGY CLINIC | Age: 43
End: 2018-07-31

## 2018-07-31 ENCOUNTER — OFFICE VISIT (OUTPATIENT)
Dept: CARDIOLOGY CLINIC | Age: 43
End: 2018-07-31

## 2018-07-31 ENCOUNTER — OFFICE VISIT (OUTPATIENT)
Dept: FAMILY MEDICINE CLINIC | Age: 43
End: 2018-07-31

## 2018-07-31 VITALS
HEART RATE: 78 BPM | TEMPERATURE: 98.7 F | RESPIRATION RATE: 14 BRPM | DIASTOLIC BLOOD PRESSURE: 74 MMHG | SYSTOLIC BLOOD PRESSURE: 112 MMHG | BODY MASS INDEX: 29.7 KG/M2 | WEIGHT: 196 LBS | HEIGHT: 68 IN | OXYGEN SATURATION: 98 %

## 2018-07-31 VITALS
WEIGHT: 196 LBS | DIASTOLIC BLOOD PRESSURE: 82 MMHG | BODY MASS INDEX: 29.7 KG/M2 | SYSTOLIC BLOOD PRESSURE: 136 MMHG | HEART RATE: 62 BPM | HEIGHT: 68 IN

## 2018-07-31 DIAGNOSIS — I49.8 BRUGADA SYNDROME: ICD-10-CM

## 2018-07-31 DIAGNOSIS — Z95.810 S/P ICD (INTERNAL CARDIAC DEFIBRILLATOR) PROCEDURE: ICD-10-CM

## 2018-07-31 DIAGNOSIS — I47.1 PAT (PAROXYSMAL ATRIAL TACHYCARDIA) (HCC): ICD-10-CM

## 2018-07-31 DIAGNOSIS — Z95.810 PRESENCE OF AUTOMATIC CARDIOVERTER/DEFIBRILLATOR (AICD): Primary | ICD-10-CM

## 2018-07-31 DIAGNOSIS — E78.5 HYPERLIPIDEMIA LDL GOAL <100: Primary | ICD-10-CM

## 2018-07-31 DIAGNOSIS — M25.521 ELBOW PAIN, RIGHT: ICD-10-CM

## 2018-07-31 DIAGNOSIS — R73.03 PREDIABETES: ICD-10-CM

## 2018-07-31 DIAGNOSIS — R74.8 ELEVATED CPK: ICD-10-CM

## 2018-07-31 DIAGNOSIS — E66.3 OVER WEIGHT: ICD-10-CM

## 2018-07-31 NOTE — PROGRESS NOTES
Chief Complaint Patient presents with  Cholesterol Problem  Blood sugar problem Prediabetic 1. Have you been to the ER, urgent care clinic since your last visit? Hospitalized since your last visit? No 
 
2. Have you seen or consulted any other health care providers outside of the [address] [place] [address] since your last visit? Include any pap smears or colon screening.  No\

## 2018-07-31 NOTE — PATIENT INSTRUCTIONS

## 2018-07-31 NOTE — MR AVS SNAPSHOT
[address] [facility] 
 
 
 [address] [facility] [place] 
[phone] Patient: Rohini Casiano MRN: [id number] CFB:9/16/[birth year]66 Visit Information Date & Time Provider Department Dept. Phone Encounter #  
 7/31/2018  2:40 PM Reuben Robles, [address] [facility] [phone] [id number] Follow-up Instructions Return in about 6 months (around 1/31/2019) for fasting, physical.  
  
Your Appointments 8/27/2019 10:15 AM  
PACEMAKER with ANA COHN [facility] ([facility] SCHEDULING) Appt Note: annual  Bio ICD/rc/HM  
 [address] [place]  
[address] [facility] [address]  
  
    
 8/27/2019 10:40 AM  
ESTABLISHED PATIENT with Caryn Guillaume MD  
[facility] ([address]) Appt Note: annual  Bio ICD/rc/HM  
 [address] [place] [address]  
[address] [facility] [address]  
  
    
  
 11/5/2018  2:00 PM  
REMOTE OFFICE VISIT with Kesha Stapleton [facility] (ISABEL SCHEDULING) Appt Note: HM ICD/rc/b 7-31-18  
 [address] [facility] [address]  
[address] [address]  
  
    
 2/11/2019  8:00 AM  
REMOTE OFFICE VISIT with Kesha Stapleton [facility] (ISABEL SCHEDULING) Appt Note: HM ICD/rc  
 [address] [place]  
[phone]  
  
    
 5/20/2019  8:00 AM  
REMOTE OFFICE VISIT with Kesha Stapleton [facility] (ISABEL SCHEDULING) Appt Note: HM ICD/rc  
 [address] [place]  
[phone] Upcoming Health Maintenance Date Due Influenza Age 5 to Adult 8/1/2018 DTaP/Tdap/Td series (2 - Td) 11/7/2026 Allergies as of 7/31/2018  Review Complete On: 7/31/2018 By: Serena Praks MD  
 No Known Allergies Current Immunizations  Reviewed on 2/3/2015 No immunizations on file. Not reviewed this visit You Were Diagnosed With   
  
 Codes Comments Hyperlipidemia LDL goal <100    -  Primary ICD-10-CM: E78.5 ICD-9-CM: 272.4 Prediabetes     ICD-10-CM: R73.03 
ICD-9-CM: 790.29 Elevated CPK     ICD-10-CM: R74.8 ICD-9-CM: 790.5 S/P ICD (internal cardiac defibrillator) procedure     ICD-10-CM: Z95.810 ICD-9-CM: V45.02 Over weight     ICD-10-CM: Z77.7 ICD-9-CM: 278.02 Vitals BP Pulse Temp Resp Height(growth percentile) Weight(growth percentile) 112/74 (BP 1 Location: Left arm, BP Patient Position: Sitting) 78 98.7 °F (37.1 °C) (Oral) 14 5' 8\" (1.727 m) 196 lb (88.9 kg) SpO2 BMI Smoking Status 98% 29.8 kg/m2 Never Smoker Vitals History BMI and BSA Data Body Mass Index Body Surface Area  
 29.8 kg/m 2 2.07 m 2 Preferred Pharmacy Pharmacy Name Phone [id number] [id number] [phone] Your Updated Medication List  
  
   
This list is accurate as of 7/31/18  3:19 PM.  Always use your most recent med list.  
  
  
  
  
 aspirin delayed-release 81 mg tablet Take  by mouth daily. fexofenadine 180 mg tablet Commonly known as:  Alroy Chato TAKE 1 TABLET BY MOUTH EVERY DAY  
  
 fish oil-omega-3 fatty acids 340-1,000 mg capsule Take 1 Cap by mouth daily. simvastatin 10 mg tablet Commonly known as:  ZOCOR  
TAKE 1 TABLET BY MOUTH EVERY EVENING  
  
 VITAMIN D3 2,000 unit Tab Generic drug:  cholecalciferol (vitamin D3) Take  by mouth. Follow-up Instructions Return in about 6 months (around 1/31/2019) for fasting, physical.  
  
  
Patient Instructions High Cholesterol: Care Instructions Your Care Instructions Cholesterol is a type of fat in your blood. It is needed for many body functions, such as making new cells. Cholesterol is made by your body. It also comes from food you eat. High cholesterol means that you have too much of the fat in your blood. This raises your risk of a heart attack and stroke. LDL and HDL are part of your total cholesterol. LDL is the \"bad\" cholesterol. High LDL can raise your risk for heart disease, heart attack, and stroke. HDL is the \"good\" cholesterol. It helps clear bad cholesterol from the body. High HDL is linked with a lower risk of heart disease, heart attack, and stroke. Your cholesterol levels help your doctor find out your risk for having a heart attack or stroke. You and your doctor can talk about whether you need to lower your risk and what treatment is best for you. A heart-healthy lifestyle along with medicines can help lower your cholesterol and your risk. The way you choose to lower your risk will depend on how high your risk is for heart attack and stroke. It will also depend on how you feel about taking medicines. Follow-up care is a key part of your treatment and safety. Be sure to make and go to all appointments, and call your doctor if you are having problems. It's also a good idea to know your test results and keep a list of the medicines you take. How can you care for yourself at home? · Eat a variety of foods every day. Good choices include fruits, vegetables, whole grains (like oatmeal), dried beans and peas, nuts and seeds, soy products (like tofu), and fat-free or low-fat dairy products. · Replace butter, margarine, and hydrogenated or partially hydrogenated oils with olive and canola oils. (Canola oil margarine without trans fat is fine.) · Replace red meat with fish, poultry, and soy protein (like tofu). · Limit processed and packaged foods like chips, crackers, and cookies. · Bake, broil, or steam foods. Don't springer them. · Be physically active. Get at least 30 minutes of exercise on most days of the week. Walking is a good choice. You also may want to do other activities, such as running, swimming, cycling, or playing tennis or team sports. · Stay at a healthy weight or lose weight by making the changes in eating and physical activity listed above. Losing just a small amount of weight, even 5 to 10 pounds, can reduce your risk for having a heart attack or stroke. · Do not smoke. When should you call for help? Watch closely for changes in your health, and be sure to contact your doctor if: 
  · You need help making lifestyle changes.  
  · You have questions about your medicine. Where can you learn more? Go to http://joanne-magali.info/. Enter P110 in the search box to learn more about \"High Cholesterol: Care Instructions. \" Current as of: May 10, 2017 Content Version: 11.7 © 7015-1559 [employer]enter[employer]. Care instructions adapted under license by [employer] (which disclaims liability or warranty for this information). If you have questions about a medical condition or this instruction, always ask your healthcare professional. [address] any warranty or liability for your use of this information. Introducing [facility]! Dear Daisy Kim: Thank you for requesting a [employer] account. Our records indicate that you already have an active [employer] account. You can access your account anytime at [email or web address] Did you know that you can access your hospital and ER discharge instructions at any time in [employer]? You can also review all of your test results from your hospital stay or ER visit. Additional Information If you have questions, please visit the Frequently Asked Questions section of the [employer] website at [email or web address] Remember, [employer] is NOT to be used for urgent needs.  For medical emergencies, dial 911. Now available from your iPhone and Android! Please provide this summary of care documentation to your next provider. Your primary care clinician is listed as Valerie Michaelser. If you have any questions after today's visit, please call [phone].

## 2018-07-31 NOTE — PROGRESS NOTES
HISTORY OF PRESENT ILLNESS Sachin Singh is a 43 y.o. male. He was seen for 6 months follow up on dyslipidemia, prediabetes, PAT Also concerned about right elbow pain, that started after he painted his wall. HPI Cardiovascular Review The patient has hyperlipidemia and Brugada sx, s/p AICD. He reports taking medications as instructed, no medication side effects noted, no chest pain on exertion, no dyspnea on exertion, no swelling of ankles, no orthostatic dizziness or lightheadedness, no orthopnea or paroxysmal nocturnal dyspnea, no palpitations, no intermittent claudication symptoms, no muscle aches or pain. Diet and Lifestyle: generally follows a low fat low cholesterol diet, generally follows a low sodium diet, follows a diabetic diet regularly, exercises regularly. Lab review: labs reviewed and discussed with patient. Medicines: On Simvastatin 10 mg and Fish oil He follows cardiology on annual bases and gets AICD interrogation and monitoring remotely. He has side effect of statin in form pf elevated CPK. Lab Results Component Value Date/Time Cholesterol, total 208 (H) 07/21/2018 09:22 AM  
 HDL Cholesterol 41 07/21/2018 09:22 AM  
 LDL, calculated 144 (H) 07/21/2018 09:22 AM  
 VLDL, calculated 23 07/21/2018 09:22 AM  
 Triglyceride 116 07/21/2018 09:22 AM  
 CHOL/HDL Ratio 4.3 02/02/2015 03:30 AM  
 
 
 
 
Endocrine Review He is seen for prediabetes. Since last visit he reports: no polyuria or polydipsia, no chest pain, dyspnea or TIA's, no numbness, tingling or pain in extremities, no unusual visual symptoms, no hypoglycemia, no significant changes. Home glucose monitoring: is not performed  no  n/a - not on medications (diet controlled). Medication side effects: n/a. Diabetic diet compliance: compliant all of the time. Lab review: labs reviewed and discussed with patient. Eye exam: UTD. Lab Results Component Value Date/Time  Hemoglobin A1c 6.1 (H) 07/21/2018 09:22 AM  
 Review of Systems Constitutional: Negative for chills, fever and malaise/fatigue. HENT: Negative for congestion, ear pain, sore throat and tinnitus. Eyes: Negative for blurred vision, double vision, pain and discharge. Respiratory: Negative for cough, shortness of breath and wheezing. Cardiovascular: Negative for chest pain, palpitations and leg swelling. Gastrointestinal: Negative for abdominal pain, blood in stool, constipation, diarrhea, nausea and vomiting. Genitourinary: Negative for dysuria, frequency, hematuria and urgency. Musculoskeletal: Negative for back pain, joint pain and myalgias. Right elbow pain Skin: Negative for rash. Neurological: Negative for dizziness, tremors, seizures and headaches. Endo/Heme/Allergies: Negative for polydipsia. Does not bruise/bleed easily. Psychiatric/Behavioral: Negative for depression and substance abuse. The patient is not nervous/anxious. Physical Exam  
Constitutional: He is oriented to person, place, and time. He appears well-developed and well-nourished. HENT:  
Head: Normocephalic and atraumatic. Right Ear: External ear normal.  
Mouth/Throat: Oropharynx is clear and moist. No oropharyngeal exudate. Eyes: Conjunctivae and EOM are normal. Pupils are equal, round, and reactive to light. No scleral icterus. Neck: Normal range of motion. Neck supple. No JVD present. No thyromegaly present. Cardiovascular: Normal rate, regular rhythm, normal heart sounds and intact distal pulses. No murmur heard. ICD in place, left upper chest  
Pulmonary/Chest: Effort normal and breath sounds normal. He has no wheezes. Abdominal: Soft. Bowel sounds are normal. He exhibits no distension and no mass. Musculoskeletal: Normal range of motion. He exhibits no edema. Right elbow: He exhibits swelling. He exhibits normal range of motion. Tenderness found. Radial head and lateral epicondyle tenderness noted.   
Lymphadenopathy: He has no cervical adenopathy. Neurological: He is alert and oriented to person, place, and time. He has normal reflexes. No cranial nerve deficit. Skin: Skin is warm and dry. No rash noted. He is not diaphoretic. Psychiatric: He has a normal mood and affect. Nursing note and vitals reviewed. ASSESSMENT and PLAN Diagnoses and all orders for this visit: 
 
1. Hyperlipidemia LDL goal <100 Discussed goal, LDL < 100, to exercise 2. Prediabetes Stable, but in persistent elevated range, discussed diet and exercise 3. Elevated CPK Stable, benefit of statin outweighs risk 4. S/P ICD (internal cardiac defibrillator) procedure 5. Over weight Discussed abnormal weight, ideal BMI and importance of diet and exercise to loose weight. Referral for exercise program was offered. Printed information about diet and lifestyle modification provided. 6. Elbow pain, right Discussed elbow brace, exercise. Likely tendonitis from over use Discussed lifestyle issues and health guidance given Patient was given an after visit summary which includes diagnoses, vital signs, current medications, instructions and references & authorized prescriptions . Pt verbalized instructions I provided and expressed understanding of discussion that was held today.  
Follow-up Disposition: 
Return in about 6 months (around 1/31/2019) for fasting, physical.

## 2018-07-31 NOTE — MR AVS SNAPSHOT
[address] [facility] [address] [place] 
[phone] Patient: Mati Cox MRN: [id number] BNE:[birth year] Visit Information Date & Time Provider Department Dept. Phone Encounter #  
 7/31/2018 11:20 AM Clay Darby MD [facility] [phone] [id number] Your Appointments 7/31/2018  2:40 PM  
ROUTINE CARE with Francine Hernandez MD  
[facility]) Appt Note: 6 mth follow up cp pb 7/9/18 LS; 6 mth follow up cp pb 7/9/18 LS  
 [address] [facility] [address]  
[phone]  
  
   
 [address] [address]  
  
    
  
 11/5/2018  2:00 PM  
REMOTE OFFICE VISIT with [employer] [facility] ([facility] SCHEDULING) Appt Note:  ICD/rc/b 7-31-18  
 [address] [facility] [address]  
[address] [address]  
  
    
 2/11/2019  8:00 AM  
REMOTE OFFICE VISIT with [facility] ([facility] SCHEDULING) Appt Note:  ICD/rc  
 [address] [facility] [place]  
[phone]  
  
    
 5/20/2019  8:00 AM  
REMOTE OFFICE VISIT with [facility] ([facility] SCHEDULING) Appt Note:  ICD/rc  
 [address] [facility]  
[phone] Upcoming Health Maintenance Date Due Influenza Age 5 to Adult 8/1/2018 DTaP/Tdap/Td series (2 - Td) 11/7/2026 Allergies as of 7/31/2018  Review Complete On: 7/31/2018 By: Skip Haq LPN No Known Allergies Current Immunizations  Reviewed on 2/3/2015 No immunizations on file. Not reviewed this visit Vitals BP Pulse Height(growth percentile) Weight(growth percentile) BMI Smoking Status  136/82 (BP 1 Location: Left arm, BP Patient Position: Sitting) 62 5' 8\" (1.727 m) 196 lb (88.9 kg) 29.8 kg/m2 Never Smoker Vitals History BMI and BSA Data Body Mass Index Body Surface Area  
 29.8 kg/m 2 2.07 m 2 Preferred Pharmacy Pharmacy Name Phone [id number] [phone] [phone] Your Updated Medication List  
  
   
This list is accurate as of 7/31/18 12:03 PM.  Always use your most recent med list.  
  
  
  
  
 aspirin delayed-release 81 mg tablet Take  by mouth daily. fexofenadine 180 mg tablet Commonly known as:  Karly Holts TAKE 1 TABLET BY MOUTH EVERY DAY  
  
 fish oil-omega-3 fatty acids 340-1,000 mg capsule Take 1 Cap by mouth daily. simvastatin 10 mg tablet Commonly known as:  ZOCOR  
TAKE 1 TABLET BY MOUTH EVERY EVENING  
  
 VITAMIN D3 2,000 unit Tab Generic drug:  cholecalciferol (vitamin D3) Take  by mouth. Patient Instructions You will need to follow up in clinic with Dr. Jasmina Goyal in 12 months. Introducing [facility]! Dear Robert Bates: Thank you for requesting a [employer] account. Our records indicate that you already have an active [employer] account. You can access your account anytime at [email or web address] Did you know that you can access your hospital and ER discharge instructions at any time in [employer]? You can also review all of your test results from your hospital stay or ER visit. Additional Information If you have questions, please visit the Frequently Asked Questions section of the Quarterly website at [email or web address] Remember, [employer] is NOT to be used for urgent needs. For medical emergencies, dial 911. Now available from your iPhone and Android! Please provide this summary of care documentation to your next provider. Your primary care clinician is listed as Ted Parrish.  If you have any questions after today's visit, please call [phone].

## 2018-08-02 NOTE — PROGRESS NOTES
Cardiac Electrophysiology Office Note     Subjective:      Sarika London is a 43 y. o.man who has Brugada syndrome/syncope and ICD implantation in February 2015. He is here today for annual follow up. He has had several episodes of atrial tachycardia but he was asymptomatic. He reports he has been doing well. He denies palpitations, irregular heart beat, SOB, chest pain or LE edema. No syncope. He see dr Phi De Leon about his thyroid nodule and pre-DM. He has been traveling to [place] and as a fymmgc-yn-ibhu next week he will be traveling there again. There is no problem with his device when [place] was [place]    Problem List  Date Reviewed: 8/2/2018          Codes Class Noted    Elevated CPK ICD-10-CM: R74.8  ICD-9-CM: 790.5  6/19/2017        Thyroid nodule ICD-10-CM: E04.1  ICD-9-CM: 241.0  10/21/2016        Prediabetes ICD-10-CM: R73.03  ICD-9-CM: 790.29  10/21/2016        S/P ICD (internal cardiac defibrillator) procedure ICD-10-CM: Z95.810  ICD-9-CM: V45.02  2/2/2015    Overview Signed 2/2/2015  7:26 PM by Betsy Blizzard, MD     Single lead Biotronik ICD DX lead DFT < 25 J 2/2/2015             Brugada syndrome ICD-10-CM: I49.8  ICD-9-CM: 746.89  2/1/2015        Hyperlipidemia LDL goal <100 ICD-10-CM: E78.5  ICD-9-CM: 272.4  3/11/2011              Current Outpatient Prescriptions   Medication Sig Dispense Refill    fexofenadine (ALLEGRA) 180 mg tablet TAKE 1 TABLET BY MOUTH EVERY DAY 90 Tab 1    simvastatin (ZOCOR) 10 mg tablet TAKE 1 TABLET BY MOUTH EVERY EVENING 90 Tab 1    aspirin delayed-release 81 mg tablet Take  by mouth daily.  fish oil-omega-3 fatty acids 340-1,000 mg capsule Take 1 Cap by mouth daily.  cholecalciferol, vitamin D3, (VITAMIN D3) 2,000 unit tab Take  by mouth.        No Known Allergies  Past Medical History:   Diagnosis Date    High cholesterol     Hypercholesteremia     Seizures ([address]      Past Surgical History:   Procedure Laterality Date    INS PPM/ICD LED SING ONLY  2/2/2015          Family History   Problem Relation Age of Onset    Diabetes Father     Elevated Lipids Father     Hypertension Father     Heart Disease Father     Hypertension Mother     Elevated Lipids Brother     Hypertension Brother      Social History   Substance Use Topics    Smoking status: Never Smoker    Smokeless tobacco: Never Used    Alcohol use No        Review of Systems:   Constitutional: Negative for fever, chills, weight loss, malaise/fatigue. HEENT: Negative for nosebleeds, vision changes. Respiratory: Negative for cough, hemoptysis, sputum production, and wheezing. Cardiovascular: Negative for chest pain, palpitations, orthopnea, claudication, leg swelling, syncope, and PND. Gastrointestinal: Negative for nausea, vomiting, diarrhea, constipation, blood in stool and melena. Genitourinary: Negative for dysuria, and hematuria. Musculoskeletal: Negative for myalgias, arthralgia. Skin: Negative for rash. Heme: Does not bleed or bruise easily. Neurological: Negative for speech change and focal weakness     Objective:     Visit Vitals    /82 (BP 1 Location: Left arm, BP Patient Position: Sitting)    Pulse 62    Ht 5' 8\" (1.727 m)    Wt 196 lb (88.9 kg)    BMI 29.8 kg/m2      Physical Exam:   Constitutional: well-developed and well-nourished. No distress. Head: Normocephalic and atraumatic. Eyes: Pupils are equal, round  Neck: supple. No JVD present. Cardiovascular: Normal rate, regular rhythm and normal heart sounds. Exam reveals no gallop and no friction rub. No murmur heard. Pulmonary/Chest: Effort normal and breath sounds normal. No wheezes. Abdominal: Soft, no tenderness. Musculoskeletal: no edema. Neurological: alert,oriented. Skin: Skin is warm and dry healed ICD  Psychiatric: normal mood and affect.  Behavior is normal. Judgment and thought content normal.        Previous ECG NSR Brugada pattern    Assessment/Plan: ICD-10-CM ICD-9-CM    1. Presence of automatic cardioverter/defibrillator (AICD) Z95.810 V45.02    2. Brugada syndrome I49.8 746.89    3. PAT (paroxysmal atrial tachycardia) (HCC) I47.1 427.0      reviewed medications and side effects in detail  He is doing well, ICD check shows proper function, several episode of SVT probably atrial tachycardia, he was asymptomatic   No syncope or recent ICD shock. Dr Kylie Lynn monitors thyroid labs. Follow-up Disposition:  Return in about 1 year (around 7/31/2019). Check Biotronik ICD remotely every 3 months      Thank you for involving me in this patient's care and please call with further concerns or questions. Radha Woodall M.D.   Electrophysiology/Cardiology  [facility] [place]  [address] [place] [address]  [phone]

## 2018-08-22 ENCOUNTER — TELEPHONE (OUTPATIENT)
Dept: CARDIOLOGY CLINIC | Age: 43
End: 2018-08-22

## 2018-08-22 NOTE — TELEPHONE ENCOUNTER
A voicemail was left informing Mr Maribel Skinner that his Home Monitoring unit has been disconnected for 21 days- assess and reconnect.

## 2018-08-29 RX ORDER — SIMVASTATIN 10 MG/1
TABLET, FILM COATED ORAL
Qty: 90 TAB | Refills: 1 | Status: SHIPPED | OUTPATIENT
Start: 2018-08-29 | End: 2019-04-03 | Stop reason: SDUPTHER

## 2018-10-23 ENCOUNTER — OFFICE VISIT (OUTPATIENT)
Dept: FAMILY MEDICINE CLINIC | Age: 43
End: 2018-10-23

## 2018-10-23 VITALS
DIASTOLIC BLOOD PRESSURE: 74 MMHG | BODY MASS INDEX: 30.16 KG/M2 | HEART RATE: 83 BPM | RESPIRATION RATE: 16 BRPM | TEMPERATURE: 98.5 F | OXYGEN SATURATION: 97 % | SYSTOLIC BLOOD PRESSURE: 116 MMHG | WEIGHT: 199 LBS | HEIGHT: 68 IN

## 2018-10-23 DIAGNOSIS — M77.8 TRICEPS TENDONITIS: ICD-10-CM

## 2018-10-23 DIAGNOSIS — M77.11 LATERAL EPICONDYLITIS OF RIGHT ELBOW: Primary | ICD-10-CM

## 2018-10-23 RX ORDER — DICLOFENAC SODIUM 75 MG/1
75 TABLET, DELAYED RELEASE ORAL 2 TIMES DAILY
Qty: 30 TAB | Refills: 0 | Status: SHIPPED | OUTPATIENT
Start: 2018-10-23 | End: 2018-11-11 | Stop reason: SDUPTHER

## 2018-10-23 NOTE — PATIENT INSTRUCTIONS
Tennis Elbow: Exercises Your Care Instructions Here are some examples of typical rehabilitation exercises for your condition. Start each exercise slowly. Ease off the exercise if you start to have pain. Your doctor or physical therapist will tell you when you can start these exercises and which ones will work best for you. How to do the exercises Wrist flexor stretch 1. Extend your arm in front of you with your palm up. 2. Bend your wrist, pointing your hand toward the floor. 3. With your other hand, gently bend your wrist farther until you feel a mild to moderate stretch in your forearm. 4. Hold for at least 15 to 30 seconds. Repeat 2 to 4 times. Wrist extensor stretch 1. Repeat steps 1 to 4 of the stretch above but begin with your extended hand palm down. Ball or sock squeeze 1. Hold a tennis ball (or a rolled-up sock) in your hand. 2. Make a fist around the ball (or sock) and squeeze. 3. Hold for about 6 seconds, and then relax for up to 10 seconds. 4. Repeat 8 to 12 times. 5. Switch the ball (or sock) to your other hand and do 8 to 12 times. Wrist deviation 1. Sit so that your arm is supported but your hand hangs off the edge of a flat surface, such as a table. 2. Hold your hand out like you are shaking hands with someone. 3. Move your hand up and down. 4. Repeat this motion 8 to 12 times. 5. Switch arms. 6. Try to do this exercise twice with each hand. Wrist curls 1. Place your forearm on a table with your hand hanging over the edge of the table, palm up. 2. Place a 1- to 2-pound weight in your hand. This may be a dumbbell, a can of food, or a filled water bottle. 3. Slowly raise and lower the weight while keeping your forearm on the table and your palm facing up. 4. Repeat this motion 8 to 12 times. 5. Switch arms, and do steps 1 through 4. 
6. Repeat with your hand facing down toward the floor. Switch arms. Biceps curls 1. Sit leaning forward with your legs slightly spread and your left hand on your left thigh. 2. Place your right elbow on your right thigh, and hold the weight with your forearm horizontal. 
3. Slowly curl the weight up and toward your chest. 
4. Repeat this motion 8 to 12 times. 5. Switch arms, and do steps 1 through 4. Follow-up care is a key part of your treatment and safety. Be sure to make and go to all appointments, and call your doctor if you are having problems. It's also a good idea to know your test results and keep a list of the medicines you take. Where can you learn more? Go to http://joanne-magali.info/. Enter Y213 in the search box to learn more about \"Tennis Elbow: Exercises. \" Current as of: November 29, 2017 Content Version: 11.8 © 2709-9220 [employer]. Care instructions adapted under license by [employer] (which disclaims liability or warranty for this information). If you have questions about a medical condition or this instruction, always ask your healthcare professional. [address] any warranty or liability for your use of this information.

## 2018-10-23 NOTE — PROGRESS NOTES
Chief Complaint Patient presents with  Elbow Pain  
  right , with swelling. symptoms occurring for two months 1. Have you been to the ER, urgent care clinic since your last visit? Hospitalized since your last visit? No 
 
2. Have you seen or consulted any other health care providers outside of the [address] [place] [address] since your last visit? Include any pap smears or colon screening.  No

## 2018-10-23 NOTE — PROGRESS NOTES
HISTORY OF PRESENT ILLNESS Migue Alvarez is a 37 y.o. male. he was seen for worsening elbow pain . HPI Elbow Pain Patient complains of right elbow pain. Onset of the symptoms was 3 months ago. 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 After initial evaluation with me he went to [facility] and had more luggage lifting for some days. . Current symptoms include swelling: onset 2 months after injury. Aggravating symptoms: grasping, lifting heavy objects. , supination/pronation as when opening doors. Patient's overall course: symptoms have progressed to a point and plateaued. and course of pain: symptoms have progressed to a point and plateaued. . Patient has had no prior elbow problems. Previous visits for this problem: yes, last seen 3 months ago by me. Evaluation to date: patient was seen by specialist in [facility] and was recommended physical therapy. Treatment to date: ice Home exercise. Review of Systems Constitutional: Negative for chills, fever and malaise/fatigue. HENT: Negative for congestion, ear pain, sore throat and tinnitus. Eyes: Negative for blurred vision, double vision, pain and discharge. Respiratory: Negative for cough, shortness of breath and wheezing. Cardiovascular: Negative for chest pain, palpitations and leg swelling. Gastrointestinal: Negative for abdominal pain, blood in stool, constipation, diarrhea, nausea and vomiting. Genitourinary: Negative for dysuria, frequency, hematuria and urgency. Musculoskeletal: Negative for back pain, joint pain and myalgias. Right elbow pain Skin: Negative for rash. Neurological: Negative for dizziness, tremors, seizures and headaches. Endo/Heme/Allergies: Negative for polydipsia. Does not bruise/bleed easily. Psychiatric/Behavioral: Negative for depression and substance abuse. The patient is not nervous/anxious.    
 
 
Physical Exam  
 Constitutional: He is oriented to person, place, and time. He appears well-developed and well-nourished. Neck: Normal range of motion. Neck supple. Cardiovascular: Normal rate, regular rhythm, normal heart sounds and intact distal pulses. Pulmonary/Chest: Effort normal and breath sounds normal.  
Abdominal: Soft. Bowel sounds are normal.  
Musculoskeletal: Normal range of motion. Right elbow: Tenderness found. Lateral epicondyle tenderness noted. Worsening pain with resisted extension, supination Neurological: He is alert and oriented to person, place, and time. No cranial nerve deficit. Skin: Skin is warm and dry. Psychiatric: He has a normal mood and affect. Nursing note and vitals reviewed. ASSESSMENT and PLAN Diagnoses and all orders for this visit: 1. Lateral epicondylitis of right elbow 
-     diclofenac EC (VOLTAREN) 75 mg EC tablet; Take 1 Tab by mouth two (2) times a day. -     REFERRAL TO PHYSICAL THERAPY 2. Triceps tendonitis 
-     diclofenac EC (VOLTAREN) 75 mg EC tablet; Take 1 Tab by mouth two (2) times a day. -     REFERRAL TO PHYSICAL THERAPY 
 
discussed home exercise, NSAID and if no improvement PT. He gets PT through his office Cortisone shot if no improvement with PT Discussed lifestyle issues and health guidance given Patient was given an after visit summary which includes diagnoses, vital signs, current medications, instructions and references & authorized prescriptions Pt verbalized instructions I provided and expressed understanding of discussion that was held today. Follow-up Disposition: Not on File

## 2018-11-05 ENCOUNTER — OFFICE VISIT (OUTPATIENT)
Dept: CARDIOLOGY CLINIC | Age: 43
End: 2018-11-05

## 2018-11-05 DIAGNOSIS — Z95.810 PRESENCE OF AUTOMATIC CARDIOVERTER/DEFIBRILLATOR (AICD): Primary | ICD-10-CM

## 2018-11-11 DIAGNOSIS — M77.11 LATERAL EPICONDYLITIS OF RIGHT ELBOW: ICD-10-CM

## 2018-11-11 DIAGNOSIS — M77.8 TRICEPS TENDONITIS: ICD-10-CM

## 2018-11-12 RX ORDER — DICLOFENAC SODIUM 75 MG/1
TABLET, DELAYED RELEASE ORAL
Qty: 30 TAB | Refills: 0 | Status: SHIPPED | OUTPATIENT
Start: 2018-11-12 | End: 2018-12-23 | Stop reason: SDUPTHER

## 2018-12-23 DIAGNOSIS — M77.11 LATERAL EPICONDYLITIS OF RIGHT ELBOW: ICD-10-CM

## 2018-12-23 DIAGNOSIS — M77.8 TRICEPS TENDONITIS: ICD-10-CM

## 2018-12-24 RX ORDER — DICLOFENAC SODIUM 75 MG/1
TABLET, DELAYED RELEASE ORAL
Qty: 30 TAB | Refills: 0 | Status: SHIPPED | OUTPATIENT
Start: 2018-12-24 | End: 2019-01-15 | Stop reason: SDUPTHER

## 2019-01-15 DIAGNOSIS — M77.8 TRICEPS TENDONITIS: ICD-10-CM

## 2019-01-15 DIAGNOSIS — M77.11 LATERAL EPICONDYLITIS OF RIGHT ELBOW: ICD-10-CM

## 2019-01-15 NOTE — TELEPHONE ENCOUNTER
Pharmacy is requesting a 90 day supply on the medication      .   Requested Prescriptions     Pending Prescriptions Disp Refills    diclofenac EC (VOLTAREN) 75 mg EC tablet 30 Tab 0     Pharmacy verified    LOV:  Tuesday, October 23, 2018

## 2019-01-16 RX ORDER — DICLOFENAC SODIUM 75 MG/1
TABLET, DELAYED RELEASE ORAL
Qty: 180 TAB | Refills: 0 | Status: SHIPPED | OUTPATIENT
Start: 2019-01-16 | End: 2019-01-18

## 2019-01-18 ENCOUNTER — APPOINTMENT (OUTPATIENT)
Dept: GENERAL RADIOLOGY | Age: 44
End: 2019-01-18
Attending: EMERGENCY MEDICINE
Payer: COMMERCIAL

## 2019-01-18 ENCOUNTER — HOSPITAL ENCOUNTER (EMERGENCY)
Age: 44
Discharge: HOME | End: 2019-01-18
Attending: EMERGENCY MEDICINE | Admitting: EMERGENCY MEDICINE
Payer: COMMERCIAL

## 2019-01-18 VITALS
HEIGHT: 68 IN | SYSTOLIC BLOOD PRESSURE: 133 MMHG | WEIGHT: 202.82 LBS | HEART RATE: 82 BPM | RESPIRATION RATE: 18 BRPM | TEMPERATURE: 98.5 F | DIASTOLIC BLOOD PRESSURE: 83 MMHG | OXYGEN SATURATION: 98 % | BODY MASS INDEX: 30.74 KG/M2

## 2019-01-18 DIAGNOSIS — E86.0 DEHYDRATION: ICD-10-CM

## 2019-01-18 DIAGNOSIS — R00.1 BRADYCARDIA: ICD-10-CM

## 2019-01-18 DIAGNOSIS — I49.8 BRUGADA SYNDROME: ICD-10-CM

## 2019-01-18 DIAGNOSIS — R55 SYNCOPE AND COLLAPSE: Primary | ICD-10-CM

## 2019-01-18 DIAGNOSIS — R11.0 NAUSEA WITHOUT VOMITING: ICD-10-CM

## 2019-01-18 LAB
ALBUMIN SERPL-MCNC: 3.8 G/DL (ref 3.5–5)
ALBUMIN/GLOB SERPL: 1.1 {RATIO} (ref 1.1–2.2)
ALP SERPL-CCNC: 67 U/L (ref 45–117)
ALT SERPL-CCNC: 59 U/L (ref 12–78)
ANION GAP SERPL CALC-SCNC: 8 MMOL/L (ref 5–15)
AST SERPL-CCNC: 33 U/L (ref 15–37)
ATRIAL RATE: 67 BPM
BASOPHILS # BLD: 0.1 K/UL (ref 0–0.1)
BASOPHILS NFR BLD: 1 % (ref 0–1)
BILIRUB SERPL-MCNC: 0.8 MG/DL (ref 0.2–1)
BUN SERPL-MCNC: 17 MG/DL (ref 6–20)
BUN/CREAT SERPL: 14 (ref 12–20)
CALCIUM SERPL-MCNC: 8.9 MG/DL (ref 8.5–10.1)
CALCULATED P AXIS, ECG09: 73 DEGREES
CALCULATED R AXIS, ECG10: 62 DEGREES
CALCULATED T AXIS, ECG11: 48 DEGREES
CHLORIDE SERPL-SCNC: 105 MMOL/L (ref 97–108)
CO2 SERPL-SCNC: 25 MMOL/L (ref 21–32)
COMMENT, HOLDF: NORMAL
CREAT SERPL-MCNC: 1.22 MG/DL (ref 0.7–1.3)
DIAGNOSIS, 93000: NORMAL
DIFFERENTIAL METHOD BLD: ABNORMAL
EOSINOPHIL # BLD: 0.3 K/UL (ref 0–0.4)
EOSINOPHIL NFR BLD: 3 % (ref 0–7)
ERYTHROCYTE [DISTWIDTH] IN BLOOD BY AUTOMATED COUNT: 13.6 % (ref 11.5–14.5)
GLOBULIN SER CALC-MCNC: 3.5 G/DL (ref 2–4)
GLUCOSE SERPL-MCNC: 104 MG/DL (ref 65–100)
HCT VFR BLD AUTO: 44.5 % (ref 36.6–50.3)
HGB BLD-MCNC: 14.2 G/DL (ref 12.1–17)
IMM GRANULOCYTES # BLD AUTO: 0 K/UL (ref 0–0.04)
IMM GRANULOCYTES NFR BLD AUTO: 0 % (ref 0–0.5)
LYMPHOCYTES # BLD: 4.4 K/UL (ref 0.8–3.5)
LYMPHOCYTES NFR BLD: 46 % (ref 12–49)
MCH RBC QN AUTO: 28.6 PG (ref 26–34)
MCHC RBC AUTO-ENTMCNC: 31.9 G/DL (ref 30–36.5)
MCV RBC AUTO: 89.5 FL (ref 80–99)
MONOCYTES # BLD: 0.5 K/UL (ref 0–1)
MONOCYTES NFR BLD: 5 % (ref 5–13)
NEUTS SEG # BLD: 4.2 K/UL (ref 1.8–8)
NEUTS SEG NFR BLD: 44 % (ref 32–75)
NRBC # BLD: 0 K/UL (ref 0–0.01)
NRBC BLD-RTO: 0 PER 100 WBC
P-R INTERVAL, ECG05: 150 MS
PLATELET # BLD AUTO: 206 K/UL (ref 150–400)
PMV BLD AUTO: 10.6 FL (ref 8.9–12.9)
POTASSIUM SERPL-SCNC: 3.8 MMOL/L (ref 3.5–5.1)
PROT SERPL-MCNC: 7.3 G/DL (ref 6.4–8.2)
Q-T INTERVAL, ECG07: 420 MS
QRS DURATION, ECG06: 102 MS
QTC CALCULATION (BEZET), ECG08: 443 MS
RBC # BLD AUTO: 4.97 M/UL (ref 4.1–5.7)
SAMPLES BEING HELD,HOLD: NORMAL
SODIUM SERPL-SCNC: 138 MMOL/L (ref 136–145)
TROPONIN I SERPL-MCNC: <0.05 NG/ML
VENTRICULAR RATE, ECG03: 67 BPM
WBC # BLD AUTO: 9.4 K/UL (ref 4.1–11.1)

## 2019-01-18 PROCEDURE — 80053 COMPREHEN METABOLIC PANEL: CPT

## 2019-01-18 PROCEDURE — 84484 ASSAY OF TROPONIN QUANT: CPT

## 2019-01-18 PROCEDURE — 99285 EMERGENCY DEPT VISIT HI MDM: CPT

## 2019-01-18 PROCEDURE — 93005 ELECTROCARDIOGRAM TRACING: CPT

## 2019-01-18 PROCEDURE — 85025 COMPLETE CBC W/AUTO DIFF WBC: CPT

## 2019-01-18 PROCEDURE — 36415 COLL VENOUS BLD VENIPUNCTURE: CPT

## 2019-01-18 PROCEDURE — 71045 X-RAY EXAM CHEST 1 VIEW: CPT

## 2019-01-18 RX ORDER — FERROUS SULFATE, DRIED 160(50) MG
1 TABLET, EXTENDED RELEASE ORAL DAILY
COMMUNITY

## 2019-01-18 RX ORDER — MINERAL OIL
180 ENEMA (ML) RECTAL
COMMUNITY

## 2019-01-18 RX ORDER — DICLOFENAC SODIUM 75 MG/1
75 TABLET, DELAYED RELEASE ORAL DAILY
COMMUNITY
End: 2019-06-19 | Stop reason: ALTCHOICE

## 2019-01-18 NOTE — CONSULTS
PT IS SEEN AND DISCUSSED WITH ER ATTENDING  CHECK ICD NOW  IV FLUID  THEN IF NO VF ICD SHOCK MAY GO HOME  FULL NOTE TO FOLLOW I WILL SEE HIM 1 HR BEFORE HE GOES

## 2019-01-18 NOTE — CONSULTS
Cardiac Electrophysiology Hospital Consultation Note     Subjective:      Dayana Looney is a 37 y.o. patient who is seen for evaluation of syncope. He tried to lose weight by working out hard for 1 hr the night before and in morning he did not eat or drink  He went to take hot shower then later he felt weak and \"not right\" tried and sat down and passed out  He had initial sinus bradycardia and hypotension and got IV fluid   He and his wife said this is the first episode since many years ago  He has ECG with normal rate and Brugada pattern      Previous:  Diagnosed with Brugada Syndrome 02/2015 after multiple syncopal events. Bi[employer]ik single lead ICD       Problem List  Date Reviewed: 10/23/2018          Codes Class Noted    Elevated CPK ICD-10-CM: R74.8  ICD-9-CM: 790.5  6/19/2017        Thyroid nodule ICD-10-CM: E04.1  ICD-9-CM: 241.0  10/21/2016        Prediabetes ICD-10-CM: R73.03  ICD-9-CM: 790.29  10/21/2016        S/P ICD (internal cardiac defibrillator) procedure ICD-10-CM: Z95.810  ICD-9-CM: V45.02  2/2/2015    Overview Signed 2/2/2015  7:26 PM by Justina Munguia MD     Single lead Biotronik ICD DX lead DFT < 25 J 2/2/2015             Brugada syndrome ICD-10-CM: I49.8  ICD-9-CM: 746.89  2/1/2015        Hyperlipidemia LDL goal <100 ICD-10-CM: E78.5  ICD-9-CM: 272.4  3/11/2011              Current Facility-Administered Medications   Medication Dose Route Frequency Provider Last Rate Last Dose    sodium chloride 0.9 % bolus infusion 1,000 mL  1,000 mL IntraVENous NOW Cristian Verma D, DO         Current Outpatient Medications   Medication Sig Dispense Refill    calcium-vitamin D (OYSTER SHELL) 500 mg(1,250mg) -200 unit per tablet Take 1 Tab by mouth daily.  diclofenac EC (VOLTAREN) 75 mg EC tablet Take 75 mg by mouth daily.  fexofenadine (ALLEGRA) 180 mg tablet Take 180 mg by mouth daily as needed for Allergies.       simvastatin (ZOCOR) 10 mg tablet TAKE 1 TABLET BY MOUTH EVERY EVENING 90 Tab 1    aspirin delayed-release 81 mg tablet Take 81 mg by mouth daily.  fish oil-omega-3 fatty acids 340-1,000 mg capsule Take 2 Caps by mouth daily. No Known Allergies  Past Medical History:   Diagnosis Date    Brugada syndrome     High cholesterol     Hypercholesteremia     Seizures (HCC)      Past Surgical History:   Procedure Laterality Date    INS PPM/ICD LED SING ONLY  2/2/2015          Family History   Problem Relation Age of Onset    Diabetes Father     Elevated Lipids Father     Hypertension Father     Heart Disease Father     Hypertension Mother     Elevated Lipids Brother     Hypertension Brother      Social History     Tobacco Use    Smoking status: Never Smoker    Smokeless tobacco: Never Used   Substance Use Topics    Alcohol use: No     Alcohol/week: 0.0 oz        Review of Systems:   Constitutional: Negative for fever, chills, weight loss, malaise/fatigue. HEENT: Negative for nosebleeds, vision changes. Respiratory: Negative for cough, hemoptysis  Cardiovascular: Negative for chest pain, palpitations, orthopnea, claudication, leg swelling, + syncope, and no PND. Gastrointestinal: Negative for nausea, vomiting, diarrhea, blood in stool and melena. Genitourinary: Negative for dysuria, and hematuria. Musculoskeletal: Negative for myalgias, arthralgia. Skin: Negative for rash. Heme: Does not bleed or bruise easily. Neurological: Negative for speech change and focal weakness     Objective:     Visit Vitals  /80   Pulse 63   Temp 98.7 °F (37.1 °C)   Resp 12   Ht 5' 8\" (1.727 m)   Wt 202 lb 13.2 oz (92 kg)   SpO2 100%   BMI 30.84 kg/m²      Physical Exam:   Constitutional: well-developed and well-nourished. No respiratory distress. Head: Normocephalic and atraumatic. Eyes: Pupils are equal, round  ENT: hearing normal  Neck: supple. No JVD present. Cardiovascular: Normal rate, regular rhythm. Exam reveals no gallop and no friction rub.  No murmur heard.  Pulmonary/Chest: Effort normal and breath sounds normal. No wheezes. Abdominal: Soft, no tenderness. Musculoskeletal: no edema. Neurological: alert,oriented. Skin: Skin is warm and dry normal left sided ICD site  Psychiatric: normal mood and affect. Behavior is normal. Judgment and thought content      Assessment/Plan:   Addendum from EP attending:   I have seen, examined patient, and discussed with nurse practitioner, registered nurse, reviewed, updated note and agree with the assessment and plan    I have talked to him and his wife  Vital signs are stable  Exam shows regular rhythm and no rub  Lungs clear  Assessment and Plan:  I have checked his ICD and it showed proper function  He had no VF or VT  He had neurally mediated syncope  I recommend him not to drive for a few days, keep drinking fluid, avoid too much exercise and hot shower and follow up with me  I discussed with ER attending       Thank you for involving me in this patient's care and please call with further concerns or questions. Lisa Fritz M.D.   Electrophysiology/Cardiology  [facility]  [address] [place] [address]  [phone]

## 2019-01-18 NOTE — ED PROVIDER NOTES
37 y.o. male with past medical history significant for Hypercholesteremia, seizures, and Brugada syndrome who presents from home via EMS with chief complaint of dizziness. Patient was admitted here 2/1/15-2/3/15 after presenting with multiple syncopal events, found to have Brugada Syndrome by cardiology. Patient had a single lead Biotronik ICD placed and was discharged home. Most recent pacemaker check was 11/5/18. Patient states yesterday, he was in his normal state of health, noting he went to the gym and ran. 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 Patient reports going to lay on the couch, drank some water, then thought maybe he should eat something. After his wife brought him breakfast, before he ate, he had another episode of dizziness and nausea and called for EMS. Of note, en route with EMS, the patient reported an increase of nausea and diaphoresis, and his heart rate dropped in the 30s. Patient's AICD has not fired. Patient reports taking a baby ASA, a statin, and diclofenac daily. Pt denies any SOB, chest pain, HA, fever, cough, or vomiting. There are no other acute medical concerns at this time. Social hx: Nonsmoker; No EtOH use PCP: Reggie Lewis MD 
Cardriologist: Pedro Pablo Fernando MD 
 
Note written by Oskar Caal, as dictated by Venu Staton, DO 10:29 AM 
 
 
The history is provided by the patient and medical records. No  was used. Past Medical History:  
Diagnosis Date  Brugada syndrome  High cholesterol  Hypercholesteremia  Seizures ([facility] [address] Past Surgical History:  
Procedure Laterality Date  INS PPM/ICD LED SING ONLY  2/2/2015 Family History:  
Problem Relation Age of Onset  Diabetes Father  Elevated Lipids Father  Hypertension Father  Heart Disease Father  Hypertension Mother  Elevated Lipids Brother  Hypertension Brother Social History Socioeconomic History  Marital status:  Spouse name: Not on file  Number of children: Not on file  Years of education: Not on file  Highest education level: Not on file Social Needs  Financial resource strain: Not on file  Food insecurity - worry: Not on file  Food insecurity - inability: Not on file  Transportation needs - medical: Not on file  Transportation needs - non-medical: Not on file Occupational History  Not on file Tobacco Use  Smoking status: Never Smoker  Smokeless tobacco: Never Used Substance and Sexual Activity  Alcohol use: No  
  Alcohol/week: 0.0 oz  Drug use: No  
 Sexual activity: Yes  
  Partners: Female Other Topics Concern  Not on file Social History Narrative  Not on file ALLERGIES: Patient has no known allergies. Review of Systems Constitutional: Positive for diaphoresis. Negative for fever. HENT: Negative for trouble swallowing. Eyes: Negative for visual disturbance. Respiratory: Negative for cough and shortness of breath. Cardiovascular: Negative for chest pain. Gastrointestinal: Positive for nausea. Negative for abdominal pain and vomiting. Genitourinary: Negative for difficulty urinating. Musculoskeletal: Negative for gait problem. Skin: Negative for rash. Neurological: Positive for dizziness. Negative for headaches. Hematological: Does not bruise/bleed easily. Psychiatric/Behavioral: Negative for sleep disturbance. All other systems reviewed and are negative. Vitals:  
 01/18/19 1013 BP: 103/68 Pulse: 60 Resp: 15 Temp: 98.7 °F (37.1 °C) SpO2: 100% Weight: 92 kg (202 lb 13.2 oz) Height: 5' 8\" (1.727 m) Physical Exam  
Constitutional: He is oriented to person, place, and time.  He appears well-developed and well-nourished. HENT:  
Head: Normocephalic and atraumatic. Eyes: Conjunctivae are normal.  
Neck: Normal range of motion. Cardiovascular: Normal rate and intact distal pulses. Pulmonary/Chest: Effort normal and breath sounds normal. No respiratory distress. Abdominal: Soft. Bowel sounds are normal. There is no tenderness. There is no rebound and no guarding. Musculoskeletal: Normal range of motion. Neurological: He is alert and oriented to person, place, and time. Skin: Skin is warm and dry. Psychiatric: His behavior is normal.  
Nursing note and vitals reviewed. Note written by Oskar Parry, as dictated by Kalpana Holloway DO 10:29 AM 
 
MDM Number of Diagnoses or Management Options Bradycardia:  
Brugada syndrome:  
Dehydration:  
Nausea without vomiting:  
Syncope and collapse:  
Diagnosis management comments: Syncope in a patient with 7 
 
 syncope w/ pacer/defibrillator and bradycardia Donnell Thurman saw pt immediately in ED and believes it was neurocardiogenic, though it sounds a little atypical for that to me. He knows the patient well and the patient responded well to fluids and feels better and would like to go home. I discussed other cardiac causes w/ Augustin and pt and pt understands and will call 911 if it recurs. Procedures ED EKG interpretation: 
Rhythm: normal sinus rhythm; and regular . Rate (approx.): 67 bpm; Normal axis; Normal intervals; No STEMI; Small inferior Q waves; Coved Brugada pattern. Note written by Oskar Parry, as dictated by Kalpana Holloway DO 10:52 AM 
  
10:57 AM 
Dr. Donnell Thurman in the ED to see the patient. States the patient will likely be able to go home. 11:04 AM 
Per Dr. Donnell Thurman, the plan is to give another 26cc IVF, his wife is to get him lunch, and have the defibrillator checked to evaluate for V-tach events this morning. If all checks out normal, the patient can be discharged home.   
 
12:17 PM  
 The patient's defibrillator was checked, which is set to pace the patient at 40 bpm. One episode of SVT 4 days ago. This morning, no events noted with the exception that the patient was briefly being paced at 40. Device is working appropriately. 12:39 PM 
Dr. Wolf Mcnulty in the ED, states the patient can be discharged home. No additional recommendations, and will see the patient in the office. Per Wolf Mcnulty, the patient reported to him that he was standing when the event occurred this morning. 1:14 PM 
Discussed results with the patient and his wife. They are agreeable for discharge as recommended by Dr. Wolf Mcnulty. Of note, the patient states while in the ambulance, they had tried twice for an IV prior to his near syncopal event and bradycardia. 1:15 PM 
Patient's results have been reviewed with them. Patient and/or family have verbally conveyed their understanding and agreement of the patient's signs, symptoms, diagnosis, treatment and prognosis and additionally agree to follow up as recommended or return to the Emergency Room should their condition change prior to follow-up. Discharge instructions have also been provided to the patient with some educational information regarding their diagnosis as well a list of reasons why they would want to return to the ER prior to their follow-up appointment should their condition change.

## 2019-01-18 NOTE — PROGRESS NOTES
Admission Medication Reconciliation: 
 
Information obtained from: This medication history was obtained from the patient and his wife. They appear to be accurate historians and brought his prescription medication bottles to the hospital.  An Trinh Section is available. Summary:  
 
Medications added: none Medications deleted: none Dose changes: diclofenac daily (versus BID) for \"tennis elbow\" Inpatient orders were reviewed and no changes are needed. Chief Complaint for this Admission:  dizziness Significant PMH/Disease States:  
Past Medical History:  
Diagnosis Date  Brugada syndrome  High cholesterol  Hypercholesteremia  Seizures ([address] Allergies:  Patient has no known allergies. Prior to Admission Medications:  
Prior to Admission Medications Prescriptions Last Dose Informant Patient Reported? Taking?  
aspirin delayed-release 81 mg tablet 1/18/2019  Yes Yes Sig: Take 81 mg by mouth daily. calcium-vitamin D (OYSTER SHELL) 500 mg(1,250mg) -200 unit per tablet 1/18/2019  Yes Yes Sig: Take 1 Tab by mouth daily. diclofenac EC (VOLTAREN) 75 mg EC tablet 1/18/2019  Yes Yes Sig: Take 75 mg by mouth daily. fexofenadine (ALLEGRA) 180 mg tablet 1/11/2019  Yes Yes Sig: Take 180 mg by mouth daily as needed for Allergies. fish oil-omega-3 fatty acids 340-1,000 mg capsule 1/18/2019  Yes Yes Sig: Take 2 Caps by mouth daily. simvastatin (ZOCOR) 10 mg tablet 1/17/2019  No Yes Sig: TAKE 1 TABLET BY MOUTH EVERY EVENING Facility-Administered Medications: None Thank you for allowing me to participate in the care of this patient. Please contact the pharmacy () or the medication reconciliation pharmacist () with any questions. Manish Chavez, Pharm. D., BCPS, BCPPS

## 2019-01-18 NOTE — DISCHARGE INSTRUCTIONS
Patient Education        Bradycardia: Care Instructions  Your Care Instructions    Bradycardia is a slow heart rate. If your heart beats too slowly, it can't supply your body with enough blood. This can make you weak or dizzy. Or it may make you pass out. Sometimes medicine can cause this problem. If this happens, your doctor may have you adjust one of your medicines. If a medicine is not the problem, your doctor may recommend a pacemaker. It is important to treat bradycardia so that you don't get more serious health problems. Your doctor will want to see you on a routine schedule to make sure that your heartbeat is normal.  Follow-up care is a key part of your treatment and safety. Be sure to make and go to all appointments, and call your doctor if you are having problems. It's also a good idea to know your test results and keep a list of the medicines you take. How can you care for yourself at home? · Take your medicines exactly as prescribed. Call your doctor if you think you are having a problem with your medicine. If your bradycardia is caused by another disease, your doctor will try to treat the disease. If it is caused by heart medicines, he or she will adjust your medicines. · Make lifestyle changes to improve your heart health. ? Get regular exercise. Try for 30 minutes on most days of the week. If you do not have other heart problems, you likely do not have limits on the type or level of activity that you can do. You may want to walk, swim, bike, or do other activities. Ask your doctor what level of exercise is safe for you. ? To control your cholesterol, avoid foods with a lot of fat, saturated fat, or sodium. Try to eat more fiber. And if your doctor says it's okay, get some exercise on most days. ? Do not smoke. Smoking can make your heart condition worse. If you need help quitting, talk to your doctor about stop-smoking programs and medicines.  These can increase your chances of quitting for good.  ? Limit alcohol to 2 drinks a day for men and 1 drink a day for women. Too much alcohol can cause health problems. Pacemaker  If you have a pacemaker, you will get more specific information about it. Be sure to:  · Check your pulse as your doctor tells you. · Have your pacemaker checked as often as your doctor recommends. You may be able to do this over the phone or computer. · Avoid strong magnetic or electrical fields. These include MRIs, welding equipment, and generators. · You will be checked several times right after you get your pacemaker and when it is time to have the battery changed. Batteries last for 5 to 15 years. · You can talk on a cell phone. But keep it 6 inches away from your pacemaker. · Microwaves, TVs, radios, and kitchen and bathroom appliances won't harm you. When should you call for help? Call 911 anytime you think you may need emergency care. For example, call if:    · You have symptoms of sudden heart failure. These may include:  ? Severe trouble breathing. ? A fast or irregular heartbeat. ? Coughing up pink, foamy mucus. ? You passed out.     · You have symptoms of a stroke. These may include:  ? Sudden numbness, tingling, weakness, or loss of movement in your face, arm, or leg, especially on only one side of your body. ? Sudden vision changes. ? Sudden trouble speaking. ? Sudden confusion or trouble understanding simple statements. ? Sudden problems with walking or balance. ? A sudden, severe headache that is different from past headaches.    Call your doctor now or seek immediate medical care if:    · You have new or changed symptoms of heart failure, such as:  ? New or increased shortness of breath. ? New or worse swelling in your legs, ankles, or feet. ? Sudden weight gain, such as more than 2 to 3 pounds in a day or 5 pounds in a week. (Your doctor may suggest a different range of weight gain.)  ? Feeling dizzy or lightheaded or like you may faint. ?  Feeling so tired or weak that you cannot do your usual activities. ? Not sleeping well. Shortness of breath wakes you at night. You need extra pillows to prop yourself up to breathe easier.    Watch closely for changes in your health, and be sure to contact your doctor if:    · You do not get better as expected. Where can you learn more? Go to http://joanne-magali.info/. Enter A538 in the search box to learn more about \"Bradycardia: Care Instructions. \"  Current as of: July 22, 2018  Content Version: 11.9  © 7074-2150 [employer]. Care instructions adapted under license by [employer] (which disclaims liability or warranty for this information). If you have questions about a medical condition or this instruction, always ask your healthcare professional. [place] [address] any warranty or liability for your use of this information. Patient Education        Dehydration: Care Instructions  Your Care Instructions  Dehydration happens when your body loses too much fluid. This might happen when you do not drink enough water or you lose large amounts of fluids from your body because of diarrhea, vomiting, or sweating. Severe dehydration can be life-threatening. Water and minerals called electrolytes help put your body fluids back in balance. Learn the early signs of fluid loss, and drink more fluids to prevent dehydration. Follow-up care is a key part of your treatment and safety. Be sure to make and go to all appointments, and call your doctor if you are having problems. It's also a good idea to know your test results and keep a list of the medicines you take. How can you care for yourself at home? · To prevent dehydration, drink plenty of fluids, enough so that your urine is light yellow or clear like water. Choose water and other caffeine-free clear liquids until you feel better.  If you have kidney, heart, or liver disease and have to limit fluids, talk with your doctor before you increase the amount of fluids you drink. · If you do not feel like eating or drinking, try taking small sips of water, sports drinks, or other rehydration drinks. · Get plenty of rest.  To prevent dehydration  · Add more fluids to your diet and daily routine, unless your doctor has told you not to. · During hot weather, drink more fluids. Drink even more fluids if you exercise a lot. Stay away from drinks with alcohol or caffeine. · Watch for the symptoms of dehydration. These include:  ? A dry, sticky mouth. ? Dark yellow urine, and not much of it. ? Dry and sunken eyes. ? Feeling very tired. · Learn what problems can lead to dehydration. These include:  ? Diarrhea, fever, and vomiting. ? Any illness with a fever, such as pneumonia or the flu. ? Activities that cause heavy sweating, such as endurance races and heavy outdoor work in hot or humid weather. ? Alcohol or drug abuse or withdrawal.  ? Certain medicines, such as cold and allergy pills (antihistamines), diet pills (diuretics), and laxatives. ? Certain diseases, such as diabetes, cancer, and heart or kidney disease. When should you call for help? Call 911 anytime you think you may need emergency care. For example, call if:    · You passed out (lost consciousness).    Call your doctor now or seek immediate medical care if:    · You are confused and cannot think clearly.     · You are dizzy or lightheaded, or you feel like you may faint.     · You have signs of needing more fluids. You have sunken eyes and a dry mouth, and you pass only a little dark urine.     · You cannot keep fluids down.    Watch closely for changes in your health, and be sure to contact your doctor if:    · You are not making tears.     · Your skin is very dry and sags slowly back into place after you pinch it.     · Your mouth and eyes are very dry. Where can you learn more? Go to http://joanne-magali.info/.   Enter K035 in the search box to learn more about \"Dehydration: Care Instructions. \"  Current as of: September 23, 2018  Content Version: 11.9  © 2747-6477 [employer]. Care instructions adapted under license by [employer] (which disclaims liability or warranty for this information). If you have questions about a medical condition or this instruction, always ask your healthcare professional. [address] any warranty or liability for your use of this information. Patient Education        Fainting: Care Instructions  Your Care Instructions    When you faint, or pass out, you lose consciousness for a short time. A brief drop in blood flow to the brain often causes it. When you fall or lie down, more blood flows to your brain and you regain consciousness. Emotional stress, pain, or overheating--especially if you have been standing--can make you faint. In these cases, fainting is usually not serious. But fainting can be a sign of a more serious problem. Your doctor may want you to have more tests to rule out other causes. The treatment you need depends on the reason why you fainted. The doctor has checked you carefully, but problems can develop later. If you notice any problems or new symptoms, get medical treatment right away. Follow-up care is a key part of your treatment and safety. Be sure to make and go to all appointments, and call your doctor if you are having problems. It's also a good idea to know your test results and keep a list of the medicines you take. How can you care for yourself at home? · Drink plenty of fluids to prevent dehydration. If you have kidney, heart, or liver disease and have to limit fluids, talk with your doctor before you increase your fluid intake. When should you call for help? Call 911 anytime you think you may need emergency care. For example, call if:    · You have symptoms of a heart problem. These may include:  ?  Chest pain or pressure. ? Severe trouble breathing. ? A fast or irregular heartbeat. ? Lightheadedness or sudden weakness. ? Coughing up pink, foamy mucus. ? Passing out. After you call 911, the  may tell you to chew 1 adult-strength or 2 to 4 low-dose aspirin. Wait for an ambulance. Do not try to drive yourself.     · You have symptoms of a stroke. These may include:  ? Sudden numbness, tingling, weakness, or loss of movement in your face, arm, or leg, especially on only one side of your body. ? Sudden vision changes. ? Sudden trouble speaking. ? Sudden confusion or trouble understanding simple statements. ? Sudden problems with walking or balance. ? A sudden, severe headache that is different from past headaches.     · You passed out (lost consciousness) again.    Watch closely for changes in your health, and be sure to contact your doctor if:    · You do not get better as expected. Where can you learn more? Go to http://joanne-magali.info/. Enter T580 in the search box to learn more about \"Fainting: Care Instructions. \"  Current as of: September 23, 2018  Content Version: 11.9  © 3627-4246 [employer]. Care instructions adapted under license by [employer] (which disclaims liability or warranty for this information). If you have questions about a medical condition or this instruction, always ask your healthcare professional. [place] [address] any warranty or liability for your use of this information. Discharge Instructions    Stay well hydrated. Limit duration of exercise. Limit time & temperature of showers. Follow up with Dr. Donnell Thurman as noted below.     Future Appointments   Date Time Provider Janel Foote   2/5/2019  2:20 PM Malik Jimenez  [address]   2/11/2019  8:00 AM [facility], [address] [facility]   5/20/2019  8:00 AM [facility], [address]   8/27/2019 10:15 AM [id number] ISABEL ORTA   8/27/2019 10:40 AM Hernesto Joiner  [address]

## 2019-01-18 NOTE — ED TRIAGE NOTES
Triage note: pt arrives via EMS from home for dizziness x 1 hours. Pt has AICD. In route, pt reported increased nausea and diaphoresis, HR dropped to 34.

## 2019-02-05 ENCOUNTER — OFFICE VISIT (OUTPATIENT)
Dept: CARDIOLOGY CLINIC | Age: 44
End: 2019-02-05

## 2019-02-05 ENCOUNTER — CLINICAL SUPPORT (OUTPATIENT)
Dept: CARDIOLOGY CLINIC | Age: 44
End: 2019-02-05

## 2019-02-05 VITALS
HEART RATE: 74 BPM | DIASTOLIC BLOOD PRESSURE: 90 MMHG | OXYGEN SATURATION: 98 % | WEIGHT: 197.8 LBS | HEIGHT: 68 IN | RESPIRATION RATE: 14 BRPM | SYSTOLIC BLOOD PRESSURE: 134 MMHG | BODY MASS INDEX: 29.98 KG/M2

## 2019-02-05 DIAGNOSIS — Z95.810 PRESENCE OF AUTOMATIC CARDIOVERTER/DEFIBRILLATOR (AICD): Primary | ICD-10-CM

## 2019-02-05 DIAGNOSIS — I49.8 BRUGADA SYNDROME: ICD-10-CM

## 2019-02-05 DIAGNOSIS — R55 SYNCOPE AND COLLAPSE: ICD-10-CM

## 2019-02-05 DIAGNOSIS — Z95.0 CARDIAC PACEMAKER IN SITU: Primary | ICD-10-CM

## 2019-02-05 DIAGNOSIS — I47.1 PAT (PAROXYSMAL ATRIAL TACHYCARDIA) (HCC): ICD-10-CM

## 2019-02-05 NOTE — PROGRESS NOTES
Cardiac Electrophysiology Office Note     Subjective:      Marcial Shah is a 37 y. o.man who is following after hospital ER visit for syncope. The patient was dehydrated and had neurally mediated syncope that day. He was rehydrated and so far he has not been back to the gym or exercised as much. The patient's wife called in for the visit today. He is feeling back to baseline now and just needs to resume working out at [employer]. He has Brugada syndrome/syncope and ICD implantation in February 2015. He is here today for annual follow up. He has had several episodes of atrial tachycardia but he was asymptomatic. He reports he has been doing well. He denies palpitations, irregular heart beat, SOB, chest pain or LE edema. No syncope. He sees dr Cornell Ortega about his thyroid nodule and pre-DM. Problem List  Date Reviewed: 2/5/2019          Codes Class Noted    Neurocardiogenic syncope ICD-10-CM: R55  ICD-9-CM: 780.2  1/18/2019    Overview Signed 1/18/2019  1:03 PM by Igor Brown MD     1/18/2019             Elevated CPK ICD-10-CM: R74.8  ICD-9-CM: 790.5  6/19/2017        Thyroid nodule ICD-10-CM: E04.1  ICD-9-CM: 241.0  10/21/2016        Prediabetes ICD-10-CM: R73.03  ICD-9-CM: 790.29  10/21/2016        S/P ICD (internal cardiac defibrillator) procedure ICD-10-CM: Z95.810  ICD-9-CM: V45.02  2/2/2015    Overview Signed 2/2/2015  7:26 PM by Igor Brown MD     Single lead Biotronik ICD DX lead DFT < 25 J 2/2/2015             Brugada syndrome ICD-10-CM: I49.8  ICD-9-CM: 746.89  2/1/2015        Hyperlipidemia LDL goal <100 ICD-10-CM: E78.5  ICD-9-CM: 272.4  3/11/2011              Current Outpatient Medications   Medication Sig Dispense Refill    calcium-vitamin D (OYSTER SHELL) 500 mg(1,250mg) -200 unit per tablet Take 1 Tab by mouth daily.  diclofenac EC (VOLTAREN) 75 mg EC tablet Take 75 mg by mouth daily.       fexofenadine (ALLEGRA) 180 mg tablet Take 180 mg by mouth daily as needed for Allergies.  simvastatin (ZOCOR) 10 mg tablet TAKE 1 TABLET BY MOUTH EVERY EVENING 90 Tab 1    aspirin delayed-release 81 mg tablet Take 81 mg by mouth daily.  fish oil-omega-3 fatty acids 340-1,000 mg capsule Take 2 Caps by mouth daily. No Known Allergies  Past Medical History:   Diagnosis Date    Brugada syndrome     High cholesterol     Hypercholesteremia     Seizures (HCC)      Past Surgical History:   Procedure Laterality Date    INS PPM/ICD LED SING ONLY  2/2/2015          Family History   Problem Relation Age of Onset    Diabetes Father     Elevated Lipids Father     Hypertension Father     Heart Disease Father     Hypertension Mother     Elevated Lipids Brother     Hypertension Brother      Social History     Tobacco Use    Smoking status: Never Smoker    Smokeless tobacco: Never Used   Substance Use Topics    Alcohol use: No     Alcohol/week: 0.0 oz        Review of Systems:   Constitutional: Negative for fever, chills, weight loss, malaise/fatigue. HEENT: Negative for nosebleeds, vision changes. Respiratory: Negative for cough, hemoptysis, sputum production, and wheezing. Cardiovascular: Negative for chest pain, palpitations, orthopnea, claudication, leg swelling, syncope, and PND. Gastrointestinal: Negative for nausea, vomiting, diarrhea, constipation, blood in stool and melena. Genitourinary: Negative for dysuria, and hematuria. Musculoskeletal: Negative for myalgias, arthralgia. Skin: Negative for rash. Heme: Does not bleed or bruise easily. Neurological: Negative for speech change and focal weakness     Objective:     Visit Vitals  /90 (BP 1 Location: Left arm, BP Patient Position: Sitting)   Pulse 74   Resp 14   Ht 5' 8\" (1.727 m)   Wt 197 lb 12.8 oz (89.7 kg)   SpO2 98%   BMI 30.08 kg/m²      Physical Exam:   Constitutional: well-developed and well-nourished. No distress. Head: Normocephalic and atraumatic.    Eyes: Pupils are equal, round  Neck: supple. No JVD present. Cardiovascular: Normal rate, regular rhythm and normal heart sounds. Exam reveals no gallop and no friction rub. No murmur heard. Pulmonary/Chest: Effort normal and breath sounds normal. No wheezes. Abdominal: Soft, no tenderness. Musculoskeletal: no edema. Neurological: alert, oriented. Skin: Skin is warm and dry healed ICD  Psychiatric: normal mood and affect. Behavior is normal. Judgment and thought content normal.        Previous ECG NSR Brugada pattern    Assessment/Plan:       ICD-10-CM ICD-9-CM    1. Presence of automatic cardioverter/defibrillator (AICD) Z95.810 V45.02    2. Brugada syndrome I49.8 746.89    3. PAT (paroxysmal atrial tachycardia) (HCC) I47.1 427.0    4. Syncope and collapse R55 780.2      reviewed medications and side effects in detail  I explained to the patient and his wife the mechanisms of neurally mediated syncope. I recommended he remain hydrated and continue to exercise. The patient's lower pacing rate back up on the ICD will be increased today to 60 beats per minute. If he has more than 40% pacing I may consider to change and upgrade single lead ICD to dual chamber  Follow-up Disposition:  Return in about 7 months (around 9/5/2019). Check Biotronik ICD remotely every 3 months      Thank you for involving me in this patient's care and please call with further concerns or questions. Alvino Gil M.D.   Electrophysiology/Cardiology  [facility] [place]  [address] [place] [address]  [phone]

## 2019-04-03 RX ORDER — SIMVASTATIN 10 MG/1
TABLET, FILM COATED ORAL
Qty: 90 TAB | Refills: 1 | Status: SHIPPED | OUTPATIENT
Start: 2019-04-03 | End: 2019-10-12 | Stop reason: SDUPTHER

## 2019-05-17 ENCOUNTER — OFFICE VISIT (OUTPATIENT)
Dept: CARDIOLOGY CLINIC | Age: 44
End: 2019-05-17

## 2019-05-17 DIAGNOSIS — Z95.810 AUTOMATIC IMPLANTABLE CARDIAC DEFIBRILLATOR IN SITU: Primary | ICD-10-CM

## 2019-06-19 ENCOUNTER — OFFICE VISIT (OUTPATIENT)
Dept: FAMILY MEDICINE CLINIC | Age: 44
End: 2019-06-19

## 2019-06-19 VITALS
SYSTOLIC BLOOD PRESSURE: 128 MMHG | RESPIRATION RATE: 16 BRPM | HEIGHT: 68 IN | DIASTOLIC BLOOD PRESSURE: 84 MMHG | WEIGHT: 194 LBS | HEART RATE: 93 BPM | OXYGEN SATURATION: 98 % | TEMPERATURE: 98.4 F | BODY MASS INDEX: 29.4 KG/M2

## 2019-06-19 DIAGNOSIS — E78.5 HYPERLIPIDEMIA LDL GOAL <100: ICD-10-CM

## 2019-06-19 DIAGNOSIS — R73.03 PREDIABETES: Primary | ICD-10-CM

## 2019-06-19 DIAGNOSIS — E04.9 THYROID ENLARGEMENT: ICD-10-CM

## 2019-06-19 DIAGNOSIS — E66.3 OVER WEIGHT: ICD-10-CM

## 2019-06-19 NOTE — PROGRESS NOTES
HISTORY OF PRESENT ILLNESS  Chencho Rothman is a 37 y.o. male. seen for follow up on prediabetes, dyslipidemia, and thyroid enlargement  Had blood work with [facility] as part of wellness visit in 03/2019. Results brought by him. Reviewed and discussed . Was in ER on 01/18/2019 for syncopal event. Diagnosed with neurocardiogenic syncope and dehydration. Since, his defibrillator setting has been changed. HPI    Cardiovascular Review  The patient has hyperlipidemia and Brugada sx, s/p AICD. He reports taking medications as instructed, no medication side effects noted, no chest pain on exertion, no dyspnea on exertion, no swelling of ankles, no orthostatic dizziness or lightheadedness, no orthopnea or paroxysmal nocturnal dyspnea, no palpitations, no intermittent claudication symptoms, no muscle aches or pain. Diet and Lifestyle: generally follows a low fat low cholesterol diet, generally follows a low sodium diet, follows a diabetic diet regularly, exercises regularly. Lab review: labs reviewed and discussed with patient. Medicines: On Simvastatin 10 mg and Fish oil  He follows cardiology on annual bases and gets AICD interrogation and monitoring remotely. He has side effect of statin in form pf elevated CPK. Is on Simvastatin 10 mg. LDL was 117 on 03/07/19  Lab Results   Component Value Date/Time    Cholesterol, total 208 (H) 07/21/2018 09:22 AM    HDL Cholesterol 41 07/21/2018 09:22 AM    LDL, calculated 144 (H) 07/21/2018 09:22 AM    VLDL, calculated 23 07/21/2018 09:22 AM    Triglyceride 116 07/21/2018 09:22 AM    CHOL/HDL Ratio 4.3 02/02/2015 03:30 AM          Endocrine Review  He is seen for prediabetes and thyroid enlargement. Since last visit he reports: no polyuria or polydipsia, no chest pain, dyspnea or TIA's, no numbness, tingling or pain in extremities, no unusual visual symptoms, no hypoglycemia, no significant changes.   Home glucose monitoring: is not performed  no  n/a - not on medications (diet controlled). Medication side effects: n/a. Diabetic diet compliance: compliant all of the time. Lab review: labs reviewed and discussed with patient. Eye exam: UTD. Lab Results   Component Value Date/Time    Hemoglobin A1c 6.1 (H) 07/21/2018 09:22 AM      A1c in 03/2019 was 6.5    Lab Results   Component Value Date/Time    TSH 1.680 12/13/2017 09:04 AM    TSH 1.360 02/28/2017 07:46 AM    T4, Free 1.28 02/05/2016 08:47 AM       Review of Systems   Constitutional: Negative for chills, fever and malaise/fatigue. HENT: Negative for congestion, ear pain, sore throat and tinnitus. Eyes: Negative for blurred vision, double vision, pain and discharge. Respiratory: Negative for cough, shortness of breath and wheezing. Cardiovascular: Negative for chest pain, palpitations and leg swelling. Gastrointestinal: Negative for abdominal pain, blood in stool, constipation, diarrhea, nausea and vomiting. Genitourinary: Negative for dysuria, frequency, hematuria and urgency. Musculoskeletal: Negative for back pain, joint pain and myalgias. Skin: Negative for rash. Neurological: Negative for dizziness, tremors, seizures and headaches. Endo/Heme/Allergies: Negative for polydipsia. Does not bruise/bleed easily. Psychiatric/Behavioral: Negative for depression and substance abuse. The patient is not nervous/anxious. Physical Exam   Constitutional: He is oriented to person, place, and time. He appears well-developed and well-nourished. HENT:   Head: Normocephalic and atraumatic. Right Ear: External ear normal.   Mouth/Throat: Oropharynx is clear and moist. No oropharyngeal exudate. Eyes: Pupils are equal, round, and reactive to light. Conjunctivae and EOM are normal. No scleral icterus. Neck: Normal range of motion. Neck supple. No JVD present. Thyromegaly present. Cardiovascular: Normal rate, regular rhythm, normal heart sounds and intact distal pulses.    No murmur heard.  ICD in place, left upper chest   Pulmonary/Chest: Effort normal and breath sounds normal. He has no wheezes. Abdominal: Soft. Bowel sounds are normal. He exhibits no distension and no mass. Musculoskeletal: Normal range of motion. He exhibits no edema. Right elbow: He exhibits normal range of motion and no swelling. No tenderness found. Lymphadenopathy:     He has no cervical adenopathy. Neurological: He is alert and oriented to person, place, and time. He has normal reflexes. No cranial nerve deficit. Skin: Skin is warm and dry. No rash noted. He is not diaphoretic. Psychiatric: He has a normal mood and affect. Nursing note and vitals reviewed. ASSESSMENT and PLAN  Diagnoses and all orders for this visit:    1. Prediabetes  -     METABOLIC PANEL, BASIC  -     HEMOGLOBIN A1C WITH EAG    2. Thyroid enlargement  -     TSH AND FREE T4    3. Hyperlipidemia LDL goal <100    4. Over weight  Assessment & Plan:  Discussed abnormal weight, ideal BMI and importance of diet and exercise to loose weight. Referral for exercise program was offered. Printed information about diet and lifestyle modification provided. Discussed lifestyle issues and health guidance given  Patient was given an after visit summary which includes diagnoses, vital signs, current medications, instructions and references & authorized prescriptions . Results of labs will be conveyed to patient, once available. Pt verbalized instructions I provided and expressed understanding of discussion that was held today. Follow-up and Dispositions    · Return if symptoms worsen or fail to improve.

## 2019-06-19 NOTE — PATIENT INSTRUCTIONS

## 2019-06-20 DIAGNOSIS — R73.03 PREDIABETES: Primary | ICD-10-CM

## 2019-06-20 LAB
BUN SERPL-MCNC: 15 MG/DL (ref 6–24)
BUN/CREAT SERPL: 14 (ref 9–20)
CALCIUM SERPL-MCNC: 10 MG/DL (ref 8.7–10.2)
CHLORIDE SERPL-SCNC: 99 MMOL/L (ref 96–106)
CO2 SERPL-SCNC: 27 MMOL/L (ref 20–29)
CREAT SERPL-MCNC: 1.04 MG/DL (ref 0.76–1.27)
EST. AVERAGE GLUCOSE BLD GHB EST-MCNC: 137 MG/DL
GLUCOSE SERPL-MCNC: 141 MG/DL (ref 65–99)
HBA1C MFR BLD: 6.4 % (ref 4.8–5.6)
POTASSIUM SERPL-SCNC: 4.3 MMOL/L (ref 3.5–5.2)
SODIUM SERPL-SCNC: 141 MMOL/L (ref 134–144)
T4 FREE SERPL-MCNC: 1.29 NG/DL (ref 0.82–1.77)
TSH SERPL DL<=0.005 MIU/L-ACNC: 1.07 UIU/ML (ref 0.45–4.5)

## 2019-06-20 RX ORDER — METFORMIN HYDROCHLORIDE 500 MG/1
500 TABLET, EXTENDED RELEASE ORAL
Qty: 90 TAB | Refills: 1 | Status: SHIPPED | OUTPATIENT
Start: 2019-06-20 | End: 2019-08-27

## 2019-06-20 NOTE — PROGRESS NOTES
Ulises Santizo,  I have reviewed your results. A1C 6.4, borderline diabetic. As discussed, strongly recommend to start on Metformin. Will send Rx to pharmacy, please start taking as directed  Kidney and thyroid function is normal  Let me know if you have any questions.   thanks

## 2019-08-27 ENCOUNTER — CLINICAL SUPPORT (OUTPATIENT)
Dept: CARDIOLOGY CLINIC | Age: 44
End: 2019-08-27

## 2019-08-27 ENCOUNTER — OFFICE VISIT (OUTPATIENT)
Dept: CARDIOLOGY CLINIC | Age: 44
End: 2019-08-27

## 2019-08-27 VITALS
SYSTOLIC BLOOD PRESSURE: 110 MMHG | DIASTOLIC BLOOD PRESSURE: 70 MMHG | RESPIRATION RATE: 16 BRPM | BODY MASS INDEX: 29.5 KG/M2 | OXYGEN SATURATION: 99 % | HEART RATE: 70 BPM | WEIGHT: 194 LBS

## 2019-08-27 DIAGNOSIS — I47.1 PAT (PAROXYSMAL ATRIAL TACHYCARDIA) (HCC): ICD-10-CM

## 2019-08-27 DIAGNOSIS — Z95.810 AUTOMATIC IMPLANTABLE CARDIAC DEFIBRILLATOR IN SITU: Primary | ICD-10-CM

## 2019-08-27 DIAGNOSIS — R55 SYNCOPE AND COLLAPSE: ICD-10-CM

## 2019-08-27 DIAGNOSIS — I49.8 BRUGADA SYNDROME: ICD-10-CM

## 2019-08-27 NOTE — PROGRESS NOTES
Cardiac Electrophysiology Office Note     Subjective:      Leona Contreras is a 37 y. o.man who is following up on AICD Biotronik  In the recent past I had seen him after hospital ER visit for syncope. The patient was dehydrated and had neurally mediated syncope that day. He was rehydrated and so far he has not been back to the gym or exercised as much. The patient's wife called in for the visit today. He is feeling back to baseline and has resumed working out at [employer]. He has not had recurrent syncope and does not feel his sinus tach or atrial tachycardia  He has elaine on his phone to check HR and does not want HR 60 bpm during sleep  I had increased from 40 to 60 bpm after his recent syncope    He has Brugada syndrome/syncope and ICD implantation in February 2015. annual follow up. He has had several episodes of atrial tachycardia but he was asymptomatic.        Problem List  Date Reviewed: 8/27/2019          Codes Class Noted    Over weight ICD-10-CM: E66.3  ICD-9-CM: 278.02  6/19/2019        Neurocardiogenic syncope ICD-10-CM: R55  ICD-9-CM: 780.2  1/18/2019    Overview Signed 1/18/2019  1:03 PM by Rai Aviles MD     1/18/2019             Elevated CPK ICD-10-CM: R74.8  ICD-9-CM: 790.5  6/19/2017        Thyroid nodule ICD-10-CM: E04.1  ICD-9-CM: 241.0  10/21/2016        Prediabetes ICD-10-CM: R73.03  ICD-9-CM: 790.29  10/21/2016        S/P ICD (internal cardiac defibrillator) procedure ICD-10-CM: Z95.810  ICD-9-CM: V45.02  2/2/2015    Overview Signed 2/2/2015  7:26 PM by Rai Aviles MD     Single lead Biotronik ICD DX lead DFT < 25 J 2/2/2015             Brugada syndrome ICD-10-CM: I49.8  ICD-9-CM: 746.89  2/1/2015        Hyperlipidemia LDL goal <100 ICD-10-CM: E78.5  ICD-9-CM: 272.4  3/11/2011              Current Outpatient Medications   Medication Sig Dispense Refill    simvastatin (ZOCOR) 10 mg tablet TAKE 1 TABLET BY MOUTH EVERY EVENING 90 Tab 1    calcium-vitamin D (OYSTER SHELL) 500 mg(1,250mg) -200 unit per tablet Take 1 Tab by mouth daily.  fexofenadine (ALLEGRA) 180 mg tablet Take 180 mg by mouth daily as needed for Allergies.  aspirin delayed-release 81 mg tablet Take 81 mg by mouth daily.  fish oil-omega-3 fatty acids 340-1,000 mg capsule Take 2 Caps by mouth daily.  metFORMIN ER (GLUCOPHAGE XR) 500 mg tablet Take 1 Tab by mouth daily (with dinner). 90 Tab 1     No Known Allergies  Past Medical History:   Diagnosis Date    Brugada syndrome     High cholesterol     Hypercholesteremia     Seizures (HCC)      Past Surgical History:   Procedure Laterality Date    INS PPM/ICD LED SING ONLY  2/2/2015          Family History   Problem Relation Age of Onset    Diabetes Father     Elevated Lipids Father     Hypertension Father     Heart Disease Father     Hypertension Mother     Elevated Lipids Brother     Hypertension Brother      Social History     Tobacco Use    Smoking status: Never Smoker    Smokeless tobacco: Never Used   Substance Use Topics    Alcohol use: No     Alcohol/week: 0.0 standard drinks        Review of Systems:   Constitutional: Negative for fever, chills, weight loss, malaise/fatigue. HEENT: Negative for nosebleeds, vision changes. Respiratory: Negative for cough, hemoptysis, sputum production, and wheezing. Cardiovascular: Negative for chest pain, palpitations, orthopnea, claudication, leg swelling, syncope, and PND. Gastrointestinal: Negative for nausea, vomiting, diarrhea, constipation, blood in stool and melena. Genitourinary: Negative for dysuria, and hematuria. Musculoskeletal: Negative for myalgias, arthralgia. Skin: Negative for rash. Heme: Does not bleed or bruise easily.    Neurological: Negative for speech change and focal weakness     Objective:     Visit Vitals  /70 (BP 1 Location: Left arm, BP Patient Position: Sitting)   Pulse 70   Resp 16   Wt 194 lb (88 kg)   SpO2 99%   BMI 29.50 kg/m²      Physical Exam: Constitutional: well-developed and well-nourished. No distress. Head: Normocephalic and atraumatic. Eyes: Pupils are equal, round  Neck: supple. No JVD present. Cardiovascular: Normal rate, regular rhythm and normal heart sounds. Exam reveals no gallop and no friction rub. No murmur heard. Pulmonary/Chest: Effort normal and breath sounds normal. No wheezes. Abdominal: Soft, no tenderness. Musculoskeletal: no edema. Neurological: alert, oriented. Skin: Skin is warm and dry healed left sided ICD  Psychiatric: normal mood and affect. Behavior is normal. Judgment and thought content normal.      Previous ECG NSR Brugada pattern    Assessment/Plan:       ICD-10-CM ICD-9-CM    1. Automatic implantable cardiac defibrillator in situ Z95.810 V45.02    2. Brugada syndrome I49.8 746.89    3. Syncope and collapse R55 780.2    4. PAT (paroxysmal atrial tachycardia) ([facility]) I47.1 427.0      reviewed medications and side effects in detail  I explained to the patient and his wife the mechanisms of neurally mediated syncope in the past  He has not had recurrent episodes. I recommended he remain hydrated and continue to exercise. The patient's lower pacing rate back up on the ICD will be 50 beats per minute today but I do not recommend it lower especially he has Brugada syndrome too and neurally mediated syncope in the past.   He has atrial tach or sinus tach and could be with exercise  He is not feeling it so I do not recommend meds or ablation    Follow-up and Dispositions    · Return in about 1 year (around 8/27/2020). Check Biotronik ICD remotely every 3 months      Thank you for involving me in this patient's care and please call with further concerns or questions. Valorie Faulkner M.D.   Electrophysiology/Cardiology  [facility]  [address], [address], [address]  [facility], [address] [place] [id number]  [phone]

## 2019-10-12 RX ORDER — SIMVASTATIN 10 MG/1
TABLET, FILM COATED ORAL
Qty: 90 TAB | Refills: 1 | Status: SHIPPED | OUTPATIENT
Start: 2019-10-12 | End: 2020-05-26 | Stop reason: SDUPTHER

## 2019-12-09 ENCOUNTER — OFFICE VISIT (OUTPATIENT)
Dept: CARDIOLOGY CLINIC | Age: 44
End: 2019-12-09

## 2019-12-09 DIAGNOSIS — Z95.810 AUTOMATIC IMPLANTABLE CARDIAC DEFIBRILLATOR IN SITU: Primary | ICD-10-CM

## 2020-01-21 ENCOUNTER — OFFICE VISIT (OUTPATIENT)
Dept: FAMILY MEDICINE CLINIC | Age: 45
End: 2020-01-21

## 2020-01-21 VITALS
HEIGHT: 68 IN | BODY MASS INDEX: 29.4 KG/M2 | RESPIRATION RATE: 18 BRPM | DIASTOLIC BLOOD PRESSURE: 84 MMHG | WEIGHT: 194 LBS | OXYGEN SATURATION: 98 % | SYSTOLIC BLOOD PRESSURE: 123 MMHG | TEMPERATURE: 98 F | HEART RATE: 72 BPM

## 2020-01-21 DIAGNOSIS — E66.3 OVER WEIGHT: ICD-10-CM

## 2020-01-21 DIAGNOSIS — E55.9 VITAMIN D DEFICIENCY: ICD-10-CM

## 2020-01-21 DIAGNOSIS — R74.8 ELEVATED CPK: ICD-10-CM

## 2020-01-21 DIAGNOSIS — Z00.00 ROUTINE GENERAL MEDICAL EXAMINATION AT A HEALTH CARE FACILITY: Primary | ICD-10-CM

## 2020-01-21 NOTE — PROGRESS NOTES
HISTORY OF PRESENT ILLNESS  Sharmin Xiao is a 40 y.o. male. seen for complete physical and age appropriate blood work  Recovering from [employer] that he had on last month, during Jer[place]ie time  HPI  Health Maintenance  Immunizations:    Influenza: he declined. Tetanus: up to date. Gardasil: n/a. Cancer screening:    Reviewed testicular, prostate, and colon cancer screening guidelines. Cardiovascular Review  The patient has hyperlipidemia and Brugada sx, s/p AICD.  He reports taking medications as instructed, no medication side effects noted, no chest pain on exertion, no dyspnea on exertion, no swelling of ankles, no orthostatic dizziness or lightheadedness, no orthopnea or paroxysmal nocturnal dyspnea, no palpitations, no intermittent claudication symptoms, no muscle aches or pain.  Diet and Lifestyle: generally follows a low fat low cholesterol diet, generally follows a low sodium diet, follows a diabetic diet regularly, exercises regularly.  Lab review: labs reviewed and discussed with patient.    Medicines: On Simvastatin 10 mg and Fish oil  He follows cardiology on annual bases and gets AICD interrogation and monitoring remotely. He has side effect of statin in form pf elevated CPK. Is on Simvastatin 10 mg.   Lab Results   Component Value Date/Time    Cholesterol, total 208 (H) 07/21/2018 09:22 AM    HDL Cholesterol 41 07/21/2018 09:22 AM    LDL, calculated 144 (H) 07/21/2018 09:22 AM    VLDL, calculated 23 07/21/2018 09:22 AM    Triglyceride 116 07/21/2018 09:22 AM    CHOL/HDL Ratio 4.3 02/02/2015 03:30 AM          Endocrine Review  He is seen for prediabetes and thyroid enlargement.  Since last visit he reports: no polyuria or polydipsia, no chest pain, dyspnea or TIA's, no numbness, tingling or pain in extremities, no unusual visual symptoms, no hypoglycemia, no significant changes.  Home glucose monitoring: is not performed  no  n/a - not on medications (diet controlled).  Medication side effects: n/a.  Diabetic diet compliance: compliant all of the time.  Lab review: labs reviewed and discussed with patient.  Eye exam: UTD.    Was recommended to start Metformin on last visit, but he wants to try diet and exercise first  Lab Results   Component Value Date/Time    Hemoglobin A1c 6.4 (H) 06/19/2019 03:09 PM      Lab Results   Component Value Date/Time    TSH 1.070 06/19/2019 03:09 PM    T4, Free 1.29 06/19/2019 03:09 PM         Patient Care Team:  Sonido Rachel MD as PCP - General (Family Practice)  Sonido Rachel [facility]  Samuel Garcia, RN as [facility] (Cardiology)  Maxine Boudreaux MD as Consulting Provider (Endocrinology)  Bill Avilez MD (Cardiology)       The following sections were reviewed & updated as appropriate: PMH, PSH, FH, and SH. Review of Systems   Constitutional: Negative for chills, fever and malaise/fatigue. HENT: Negative for congestion, ear pain, sore throat and tinnitus. Eyes: Negative for blurred vision, double vision, pain and discharge. Respiratory: Negative for cough, shortness of breath and wheezing. Cardiovascular: Negative for chest pain, palpitations and leg swelling. Gastrointestinal: Negative for abdominal pain, blood in stool, constipation, diarrhea, nausea and vomiting. Genitourinary: Negative for dysuria, frequency, hematuria and urgency. Musculoskeletal: Negative for back pain, joint pain and myalgias. Skin: Negative for rash. Neurological: Negative for dizziness, tremors, seizures and headaches. Endo/Heme/Allergies: Negative for polydipsia. Does not bruise/bleed easily. Psychiatric/Behavioral: Negative for depression and substance abuse. The patient is not nervous/anxious. Physical Exam  Vitals signs and nursing note reviewed. Constitutional:       Appearance: He is well-developed. He is not diaphoretic. HENT:      Head: Normocephalic and atraumatic.       Right Ear: External ear normal.      Left Ear: External ear normal.      Nose: Nose normal.      Mouth/Throat:      Pharynx: No oropharyngeal exudate. Eyes:      General: No scleral icterus. Conjunctiva/sclera: Conjunctivae normal.      Pupils: Pupils are equal, round, and reactive to light. Neck:      Musculoskeletal: Normal range of motion and neck supple. Thyroid: Thyromegaly present. Vascular: No JVD. Cardiovascular:      Rate and Rhythm: Normal rate and regular rhythm. Heart sounds: Normal heart sounds. No murmur. Comments: ICD in place, left upper chest  Pulmonary:      Effort: Pulmonary effort is normal. No respiratory distress. Breath sounds: Normal breath sounds. No wheezing. Abdominal:      General: Bowel sounds are normal. There is no distension. Palpations: Abdomen is soft. There is no mass. Genitourinary:     Penis: Normal.       Scrotum/Testes: Normal.         Right: Mass not present. Left: Mass not present. Musculoskeletal: Normal range of motion. General: No tenderness. Right elbow: He exhibits normal range of motion and no swelling. No tenderness found. Lymphadenopathy:      Cervical: No cervical adenopathy. Skin:     General: Skin is warm and dry. Findings: No rash. Neurological:      Mental Status: He is alert and oriented to person, place, and time. Cranial Nerves: No cranial nerve deficit. Sensory: No sensory deficit. Deep Tendon Reflexes: Reflexes are normal and symmetric. Comments: Cranial nerves 2-12 normal   Psychiatric:         Behavior: Behavior normal.         Thought Content: Thought content normal.         ASSESSMENT and PLAN  Diagnoses and all orders for this visit:    1.  Routine general medical examination at a health care facility  -     CBC WITH AUTOMATED DIFF  -     METABOLIC PANEL, COMPREHENSIVE  -     HEMOGLOBIN A1C WITH EAG  -     TSH REFLEX TO T4  -     URINALYSIS W/ RFLX MICROSCOPIC  -     LIPID PANEL    2. Over weight  Assessment & Plan:  Improving. Discussed abnormal weight, ideal BMI and importance of diet and exercise to loose weight. Referral for exercise program was offered. Printed information about diet and lifestyle modification provided. 3. Elevated CPK  -     CK    4. Vitamin D deficiency  -     VITAMIN D, 25 HYDROXY  Discussed abnormal weight, ideal BMI and importance of diet and exercise to loose weight. Referral for exercise program was offered. Printed information about diet and lifestyle modification provided.

## 2020-01-21 NOTE — PATIENT INSTRUCTIONS
Learning About Low-Carbohydrate Diets for Weight Loss What is a low-carbohydrate diet? Low-carb diets avoid foods that are high in carbohydrate. These high-carb foods include pasta, bread, rice, cereal, fruits, and starchy vegetables. Instead, these diets usually have you eat foods that are high in fat and protein. Many people lose weight quickly on a low-carb diet. But the early weight loss is water. People on this diet often gain the weight back after they start eating carbs again. Not all diet plans are safe or work well. A lot of the evidence shows that low-carb diets aren't healthy. That's because these diets often don't include healthy foods like fruits and vegetables. Losing weight safely means balancing protein, fat, and carbs with every meal and snack. And low-carb diets don't always provide the vitamins, minerals, and fiber you need. If you have a serious medical condition, talk to your doctor before you try any diet. These conditions include kidney disease, heart disease, type 2 diabetes, high cholesterol, and high blood pressure. If you are pregnant, it may not be safe for your baby if you are on a low-carb diet. How can you lose weight safely? You might have heard that a diet plan helped another person lose weight. But that doesn't mean that it will work for you. It is very hard to stay on a diet that includes lots of big changes in your eating habits. If you want to get to a healthy weight and stay there, making healthy lifestyle changes will often work better than dieting. These steps can help. · Make a plan for change. Work with your doctor to create a plan that is right for you. · See a dietitian. He or she can show you how to make healthy changes in your eating habits. · Manage stress. If you have a lot of stress in your life, it can be hard to focus on making healthy changes to your daily habits. · Track your food and activity.  You are likely to do better at losing weight if you keep track of what you eat and what you do. Follow-up care is a key part of your treatment and safety. Be sure to make and go to all appointments, and call your doctor if you are having problems. It's also a good idea to know your test results and keep a list of the medicines you take. Where can you learn more? Go to http://joanne-magali.info/. Enter A121 in the search box to learn more about \"Learning About Low-Carbohydrate Diets for Weight Loss. \" Current as of: November 7, 2018 Content Version: 12.2 © 9424-7751 [employer]orporated. Care instructions adapted under license by [employer] (which disclaims liability or warranty for this information). If you have questions about a medical condition or this instruction, always ask your healthcare professional. [address] any warranty or liability for your use of this information.

## 2020-01-21 NOTE — PROGRESS NOTES
Chief Complaint   Patient presents with    Cholesterol Problem     Patient is in the office today for a follow up on cholesterol. 1. Have you been to the ER, urgent care clinic since your last visit? Hospitalized since your last visit? No    2. Have you seen or consulted any other health care providers outside of the [address] [place] [address] since your last visit? Include any pap smears or colon screening.  No

## 2020-01-24 LAB
25(OH)D3+25(OH)D2 SERPL-MCNC: 37.4 NG/ML (ref 30–100)
ALBUMIN SERPL-MCNC: 4.4 G/DL (ref 4–5)
ALBUMIN/GLOB SERPL: 1.5 {RATIO} (ref 1.2–2.2)
ALP SERPL-CCNC: 62 IU/L (ref 39–117)
ALT SERPL-CCNC: 33 IU/L (ref 0–44)
APPEARANCE UR: CLEAR
AST SERPL-CCNC: 23 IU/L (ref 0–40)
BASOPHILS # BLD AUTO: 0 X10E3/UL (ref 0–0.2)
BASOPHILS NFR BLD AUTO: 1 %
BILIRUB SERPL-MCNC: 0.6 MG/DL (ref 0–1.2)
BILIRUB UR QL STRIP: NEGATIVE
BUN SERPL-MCNC: 12 MG/DL (ref 6–24)
BUN/CREAT SERPL: 11 (ref 9–20)
CALCIUM SERPL-MCNC: 9.8 MG/DL (ref 8.7–10.2)
CHLORIDE SERPL-SCNC: 101 MMOL/L (ref 96–106)
CHOLEST SERPL-MCNC: 197 MG/DL (ref 100–199)
CK SERPL-CCNC: 191 U/L (ref 24–204)
CO2 SERPL-SCNC: 24 MMOL/L (ref 20–29)
COLOR UR: YELLOW
CREAT SERPL-MCNC: 1.06 MG/DL (ref 0.76–1.27)
EOSINOPHIL # BLD AUTO: 0.3 X10E3/UL (ref 0–0.4)
EOSINOPHIL NFR BLD AUTO: 4 %
ERYTHROCYTE [DISTWIDTH] IN BLOOD BY AUTOMATED COUNT: 13.8 % (ref 11.6–15.4)
EST. AVERAGE GLUCOSE BLD GHB EST-MCNC: 137 MG/DL
GLOBULIN SER CALC-MCNC: 2.9 G/DL (ref 1.5–4.5)
GLUCOSE SERPL-MCNC: 106 MG/DL (ref 65–99)
GLUCOSE UR QL: NEGATIVE
HBA1C MFR BLD: 6.4 % (ref 4.8–5.6)
HCT VFR BLD AUTO: 46 % (ref 37.5–51)
HDLC SERPL-MCNC: 36 MG/DL
HGB BLD-MCNC: 15.1 G/DL (ref 13–17.7)
HGB UR QL STRIP: NEGATIVE
IMM GRANULOCYTES # BLD AUTO: 0 X10E3/UL (ref 0–0.1)
IMM GRANULOCYTES NFR BLD AUTO: 0 %
INTERPRETATION, 910389: NORMAL
KETONES UR QL STRIP: NEGATIVE
LDLC SERPL CALC-MCNC: 124 MG/DL (ref 0–99)
LEUKOCYTE ESTERASE UR QL STRIP: NEGATIVE
LYMPHOCYTES # BLD AUTO: 2.5 X10E3/UL (ref 0.7–3.1)
LYMPHOCYTES NFR BLD AUTO: 35 %
MCH RBC QN AUTO: 28.5 PG (ref 26.6–33)
MCHC RBC AUTO-ENTMCNC: 32.8 G/DL (ref 31.5–35.7)
MCV RBC AUTO: 87 FL (ref 79–97)
MICRO URNS: NORMAL
MONOCYTES # BLD AUTO: 0.5 X10E3/UL (ref 0.1–0.9)
MONOCYTES NFR BLD AUTO: 7 %
NEUTROPHILS # BLD AUTO: 3.8 X10E3/UL (ref 1.4–7)
NEUTROPHILS NFR BLD AUTO: 53 %
NITRITE UR QL STRIP: NEGATIVE
PH UR STRIP: 7 [PH] (ref 5–7.5)
PLATELET # BLD AUTO: 309 X10E3/UL (ref 150–450)
POTASSIUM SERPL-SCNC: 4.6 MMOL/L (ref 3.5–5.2)
PROT SERPL-MCNC: 7.3 G/DL (ref 6–8.5)
PROT UR QL STRIP: NEGATIVE
RBC # BLD AUTO: 5.3 X10E6/UL (ref 4.14–5.8)
SODIUM SERPL-SCNC: 140 MMOL/L (ref 134–144)
SP GR UR: 1 (ref 1–1.03)
TRIGL SERPL-MCNC: 184 MG/DL (ref 0–149)
TSH SERPL DL<=0.005 MIU/L-ACNC: 1.22 UIU/ML (ref 0.45–4.5)
UROBILINOGEN UR STRIP-MCNC: 0.2 MG/DL (ref 0.2–1)
VLDLC SERPL CALC-MCNC: 37 MG/DL (ref 5–40)
WBC # BLD AUTO: 7 X10E3/UL (ref 3.4–10.8)

## 2020-01-24 NOTE — PROGRESS NOTES
Ulises Santizo,  I have reviewed your results. Blood count,kidney,liver,thyroid,urine analysis,vitamin D results are normal  Fasting sugar and average sugar both are abnormal and you are borderline diabetic.you are doing best with your diet and exercise so now I strongly recommend to start on Metformin to improve sugar, metabolism and to reduce cardiovascular risk. Let me know if you agree  Cholesterol results show elevated Triglyceride and LDL. Due to your h/o ICD, our LDL goal is < 100 for you. Recommend to switch Simvastatin to Atorvastatin 10 mg. Let me know if you agree, I will send Rx for Metformin and Lipitor. I have completed your health screening form . will fax to [employer] and will mail copy to you  Let me know if you have any questions.   thanks

## 2020-03-13 ENCOUNTER — OFFICE VISIT (OUTPATIENT)
Dept: CARDIOLOGY CLINIC | Age: 45
End: 2020-03-13

## 2020-03-13 DIAGNOSIS — Z95.810 AUTOMATIC IMPLANTABLE CARDIAC DEFIBRILLATOR IN SITU: Primary | ICD-10-CM

## 2020-05-26 RX ORDER — SIMVASTATIN 10 MG/1
10 TABLET, FILM COATED ORAL
Qty: 90 TAB | Refills: 1 | Status: SHIPPED | OUTPATIENT
Start: 2020-05-26 | End: 2021-03-01 | Stop reason: SDUPTHER

## 2020-05-26 NOTE — TELEPHONE ENCOUNTER
Last Visit: 1/21/20  MD Bull Hernandez  Next Appointment: not scheduled  Previous Refill Encounter(s): 10/12/19  90 + 1 refill    Requested Prescriptions     Pending Prescriptions Disp Refills    simvastatin (ZOCOR) 10 mg tablet 90 Tab 1     Sig: Take 1 Tab by mouth nightly.

## 2020-06-16 ENCOUNTER — OFFICE VISIT (OUTPATIENT)
Dept: CARDIOLOGY CLINIC | Age: 45
End: 2020-06-16

## 2020-06-16 DIAGNOSIS — Z95.810 AUTOMATIC IMPLANTABLE CARDIAC DEFIBRILLATOR IN SITU: Primary | ICD-10-CM

## 2020-07-14 ENCOUNTER — PATIENT MESSAGE (OUTPATIENT)
Dept: FAMILY MEDICINE CLINIC | Age: 45
End: 2020-07-14

## 2020-07-14 DIAGNOSIS — R73.03 PREDIABETES: ICD-10-CM

## 2020-07-14 RX ORDER — METFORMIN HYDROCHLORIDE 500 MG/1
500 TABLET, EXTENDED RELEASE ORAL
Qty: 90 TAB | Refills: 1 | Status: SHIPPED | OUTPATIENT
Start: 2020-07-14 | End: 2020-12-18 | Stop reason: SDUPTHER

## 2020-07-14 NOTE — TELEPHONE ENCOUNTER
From: Dontrell Allen  To: Jonna Callahan MD  Sent: 7/14/2020 11:57 AM EDT  Subject: Prescription Question    Please note I am not able to see the medication METFORMIN HCL  MG TABLET in my Refill request , I asked my pharmacy to request,can you please approve the request?

## 2020-09-04 ENCOUNTER — PATIENT MESSAGE (OUTPATIENT)
Dept: FAMILY MEDICINE CLINIC | Age: 45
End: 2020-09-04

## 2020-09-04 DIAGNOSIS — R73.03 PREDIABETES: Primary | ICD-10-CM

## 2020-09-08 RX ORDER — METFORMIN HYDROCHLORIDE 500 MG/1
500 TABLET ORAL 2 TIMES DAILY WITH MEALS
Qty: 180 TAB | Refills: 0 | Status: SHIPPED | OUTPATIENT
Start: 2020-09-08 | End: 2020-11-09 | Stop reason: ALTCHOICE

## 2020-09-08 NOTE — TELEPHONE ENCOUNTER
From: Roly Ernandez  To: Nicole Chester MD  Sent: 9/4/2020 5:46 PM EDT  Subject: Prescription Question    Dr Nathalia Narvaez,   [facility] things are going good at your end I received a message from [facility] stating that Metformin HCL ER 500MG is being recalled can you please prescribe an alternative medicine for me?

## 2020-09-28 ENCOUNTER — OFFICE VISIT (OUTPATIENT)
Dept: CARDIOLOGY CLINIC | Age: 45
End: 2020-09-28
Payer: COMMERCIAL

## 2020-09-28 DIAGNOSIS — Z95.810 AUTOMATIC IMPLANTABLE CARDIAC DEFIBRILLATOR IN SITU: Primary | ICD-10-CM

## 2020-09-28 PROCEDURE — 93296 REM INTERROG EVL PM/IDS: CPT | Performed by: INTERNAL MEDICINE

## 2020-09-28 PROCEDURE — 93295 DEV INTERROG REMOTE 1/2/MLT: CPT | Performed by: INTERNAL MEDICINE

## 2020-11-09 ENCOUNTER — VIRTUAL VISIT (OUTPATIENT)
Dept: FAMILY MEDICINE CLINIC | Age: 45
End: 2020-11-09
Payer: COMMERCIAL

## 2020-11-09 DIAGNOSIS — E78.5 HYPERLIPIDEMIA LDL GOAL <100: Primary | ICD-10-CM

## 2020-11-09 DIAGNOSIS — I47.1 PAT (PAROXYSMAL ATRIAL TACHYCARDIA) (HCC): ICD-10-CM

## 2020-11-09 DIAGNOSIS — R74.8 ELEVATED CPK: ICD-10-CM

## 2020-11-09 DIAGNOSIS — R73.03 PREDIABETES: ICD-10-CM

## 2020-11-09 PROCEDURE — 99214 OFFICE O/P EST MOD 30 MIN: CPT | Performed by: FAMILY MEDICINE

## 2020-11-09 NOTE — PATIENT INSTRUCTIONS
Prediabetes: Care Instructions Overview Prediabetes is a warning sign that you're at risk for getting type 2 diabetes. It means that your blood sugar is higher than it should be. But it's not high enough to be diabetes. The food you eat naturally turns into sugar. Your body uses the sugar for energy. Normally, an organ called the pancreas makes insulin. And insulin allows the sugar in your blood to get into your body's cells. But sometimes the body can't use insulin the right way. So the sugar stays in your blood instead. This is called insulin resistance. The buildup of sugar in your blood means you have prediabetes. The good news is that you may be able to prevent or delay diabetes. Making small lifestyle changes, like getting active and changing your eating habits, may help you get your blood sugar back to normal. You can work with your doctor to make a treatment plan. Follow-up care is a key part of your treatment and safety. Be sure to make and go to all appointments, and call your doctor if you are having problems. It's also a good idea to know your test results and keep a list of the medicines you take. How can you care for yourself at home? · Watch your weight. A healthy weight helps your body use insulin properly. · Limit the amount of calories, sweets, and unhealthy fat you eat. Ask your doctor if you should see a dietitian. A registered dietitian can help you create meal plans that fit your lifestyle. · Get at least 30 minutes of exercise on most days of the week. Exercise helps control your blood sugar. It also helps you maintain a healthy weight. Walking is a good choice. You also may want to do other activities, such as running, swimming, cycling, or playing tennis or team sports. · Do not smoke. Smoking can make prediabetes worse. If you need help quitting, talk to your doctor about stop-smoking programs and medicines. These can increase your chances of quitting for good. · If your doctor prescribed medicines, take them exactly as prescribed. Call your doctor if you think you are having a problem with your medicine. You will get more details on the specific medicines your doctor prescribes. When should you call for help? Watch closely for changes in your health, and be sure to contact your doctor if: 
  · You have any symptoms of diabetes. These may include: 
? Being thirsty more often. ? Urinating more. ? Being hungrier. ? Losing weight. ? Being very tired. ? Having blurry vision.  
  · You have a wound that will not heal.  
  · You have an infection that will not go away.  
  · You have problems with your blood pressure.  
  · You want more information about diabetes and how you can keep from getting it. Where can you learn more? Go to http://www.gray.com/ Enter I222 in the search box to learn more about \"Prediabetes: Care Instructions. \" Current as of: December 20, 2019               Content Version: 12.6 © 6812-8648 [employer]. Care instructions adapted under license by [employer] (which disclaims liability or warranty for this information). If you have questions about a medical condition or this instruction, always ask your healthcare professional. [address] any warranty or liability for your use of this information.

## 2020-11-09 NOTE — PROGRESS NOTES
Juanita Smart is a 39 y.o. male who was seen by synchronous (real-time) audio-video technology on 11/9/2020 for Cholesterol Problem and Blood sugar problem        Assessment & Plan:   Diagnoses and all orders for this visit:    1. Hyperlipidemia LDL goal <100  -     LIPID PANEL; Future  -     METABOLIC PANEL, COMPREHENSIVE; Future    2. Elevated CPK  -     CK; Future    3. PAT (paroxysmal atrial tachycardia) (HCC)  Assessment & Plan:  Well Controlled, based on history, physical exam and review of pertinent labs, studies and medications; meds reconciled; continue current treatment plan, medication compliance emphasized. , This condition is managed by Specialist.  Key CAD CHF Meds             simvastatin (ZOCOR) 10 mg tablet (Taking) Take 1 Tab by mouth nightly. aspirin delayed-release 81 mg tablet (Taking) Take 81 mg by mouth daily. fish oil-omega-3 fatty acids 340-1,000 mg capsule (Taking) Take 2 Caps by mouth daily. Lab Results   Component Value Date/Time    Sodium 140 01/23/2020 08:52 AM    Potassium 4.6 01/23/2020 08:52 AM    Cholesterol, total 197 01/23/2020 08:52 AM    HDL Cholesterol 36 01/23/2020 08:52 AM    LDL, calculated 124 01/23/2020 08:52 AM    Triglyceride 184 01/23/2020 08:52 AM       Orders:  -     LIPID PANEL; Future    4. Prediabetes  -     METABOLIC PANEL, COMPREHENSIVE; Future  -     HEMOGLOBIN A1C WITH EAG; Future      I spent at least 20 minutes on this visit with this established patient.   Discussed diet and exercise along with medicines    Subjective:     Cardiovascular Review  The patient has hyperlipidemia and Brugada sx, s/p AICD.  He reports taking medications as instructed, no medication side effects noted, no chest pain on exertion, no dyspnea on exertion, no swelling of ankles, no orthostatic dizziness or lightheadedness, no orthopnea or paroxysmal nocturnal dyspnea, no palpitations, no intermittent claudication symptoms, no muscle aches or pain.  Diet and Lifestyle: generally follows a low fat low cholesterol diet, generally follows a low sodium diet, follows a diabetic diet regularly, exercises regularly.  Lab review: labs reviewed and discussed with patient.    Medicines: On Simvastatin 10 mg and Fish oil  He follows cardiology on annual bases and gets AICD interrogation and monitoring remotely. He has side effect of statin in form pf elevated CPK. Is on Simvastatin 10 mg. Lab Results   Component Value Date/Time    Cholesterol, total 197 01/23/2020 08:52 AM    HDL Cholesterol 36 (L) 01/23/2020 08:52 AM    LDL, calculated 124 (H) 01/23/2020 08:52 AM    VLDL, calculated 37 01/23/2020 08:52 AM    Triglyceride 184 (H) 01/23/2020 08:52 AM    CHOL/HDL Ratio 4.3 02/02/2015 03:30 AM          Endocrine Review  He is seen for prediabetes and thyroid enlargement.  Since last visit he reports: no polyuria or polydipsia, no chest pain, dyspnea or TIA's, no numbness, tingling or pain in extremities, no unusual visual symptoms, no hypoglycemia, no significant changes.  Home glucose monitoring: is not performed  no  n/a - not on medications (diet controlled).  Medication side effects: n/a.  Diabetic diet compliance: compliant all of the time.  Lab review: labs reviewed and discussed with patient.  Eye exam: UTD.    Started on Metformin  mg  Lab Results   Component Value Date/Time    Hemoglobin A1c 6.4 (H) 01/23/2020 08:52 AM      Lab Results   Component Value Date/Time    TSH 1.220 01/23/2020 08:52 AM    TSH 1.070 06/19/2019 03:09 PM    T4, Free 1.29 06/19/2019 03:09 PM         Prior to Admission medications    Medication Sig Start Date End Date Taking? Authorizing Provider   metFORMIN ER (GLUCOPHAGE XR) 500 mg tablet Take 1 Tab by mouth daily (with dinner). 7/14/20  Yes Keiko Cuevas MD   simvastatin (ZOCOR) 10 mg tablet Take 1 Tab by mouth nightly.  5/26/20  Yes Keiko Cuevas MD   calcium-vitamin D (OYSTER SHELL) 500 mg(1,250mg) -200 unit per tablet Take 1 Tab by mouth daily. Yes Provider, Historical   fexofenadine (ALLEGRA) 180 mg tablet Take 180 mg by mouth daily as needed for Allergies. Yes Provider, Historical   aspirin delayed-release 81 mg tablet Take 81 mg by mouth daily. Yes Provider, Historical   fish oil-omega-3 fatty acids 340-1,000 mg capsule Take 2 Caps by mouth daily. Yes Provider, Historical     Patient Active Problem List    Diagnosis Date Noted    PAT (paroxysmal atrial tachycardia) ([facility] [address] 11/09/2020    Over weight 06/19/2019    Neurocardiogenic syncope 01/18/2019    Elevated CPK 06/19/2017    Thyroid nodule 10/21/2016    Prediabetes 10/21/2016    S/P ICD (internal cardiac defibrillator) procedure 02/02/2015    Brugada syndrome 02/01/2015    Hyperlipidemia LDL goal <100 03/11/2011     Current Outpatient Medications   Medication Sig Dispense Refill    metFORMIN ER (GLUCOPHAGE XR) 500 mg tablet Take 1 Tab by mouth daily (with dinner). 90 Tab 1    simvastatin (ZOCOR) 10 mg tablet Take 1 Tab by mouth nightly. 90 Tab 1    calcium-vitamin D (OYSTER SHELL) 500 mg(1,250mg) -200 unit per tablet Take 1 Tab by mouth daily.  fexofenadine (ALLEGRA) 180 mg tablet Take 180 mg by mouth daily as needed for Allergies.  aspirin delayed-release 81 mg tablet Take 81 mg by mouth daily.  fish oil-omega-3 fatty acids 340-1,000 mg capsule Take 2 Caps by mouth daily. No Known Allergies    Review of Systems   Constitutional: Negative for chills, fever and malaise/fatigue. HENT: Negative for congestion, ear pain, sore throat and tinnitus. Eyes: Negative for blurred vision, double vision, pain and discharge. Respiratory: Negative for cough, shortness of breath and wheezing. Cardiovascular: Negative for chest pain, palpitations and leg swelling. Gastrointestinal: Negative for abdominal pain, blood in stool, constipation, diarrhea, nausea and vomiting. Genitourinary: Negative for dysuria, frequency, hematuria and urgency.    Musculoskeletal: Negative for back pain, joint pain and myalgias. Skin: Negative for rash. Neurological: Negative for dizziness, tremors, seizures and headaches. Endo/Heme/Allergies: Negative for polydipsia. Does not bruise/bleed easily. Psychiatric/Behavioral: Negative for depression and substance abuse. The patient is not nervous/anxious.         Objective:     Patient-Reported Vitals 11/9/2020   Patient-Reported Weight 180   Patient-Reported Height 510   Patient-Reported Pulse 88   Patient-Reported Temperature 98   Patient-Reported SpO2 97   Patient-Reported Systolic  320   Patient-Reported Diastolic 79        [INSTRUCTIONS:  \"[x]\" Indicates a positive item  \"[]\" Indicates a negative item  -- DELETE ALL ITEMS NOT EXAMINED]    Constitutional: [x] Appears well-developed and well-nourished [x] No apparent distress      [] Abnormal -     Mental status: [x] Alert and awake  [x] Oriented to person/place/time [x] Able to follow commands    [] Abnormal -     Eyes:   EOM    [x]  Normal    [] Abnormal -   Sclera  [x]  Normal    [] Abnormal -          Discharge [x]  None visible   [] Abnormal -     HENT: [x] Normocephalic, atraumatic  [] Abnormal -   [x] Mouth/Throat: Mucous membranes are moist    External Ears [x] Normal  [] Abnormal -    Neck: [x] No visualized mass [] Abnormal -     Pulmonary/Chest: [x] Respiratory effort normal   [x] No visualized signs of difficulty breathing or respiratory distress        [] Abnormal -      Musculoskeletal:   [x] Normal gait with no signs of ataxia         [x] Normal range of motion of neck        [] Abnormal -     Neurological:        [x] No Facial Asymmetry (Cranial nerve 7 motor function) (limited exam due to video visit)          [x] No gaze palsy        [] Abnormal -          Skin:        [x] No significant exanthematous lesions or discoloration noted on facial skin         [] Abnormal -            Psychiatric:       [x] Normal Affect [] Abnormal -        [x] No Hallucinations    Other pertinent observable physical exam findings:-        We discussed the expected course, resolution and complications of the diagnosis(es) in detail. Medication risks, benefits, costs, interactions, and alternatives were discussed as indicated. I advised him to contact the office if his condition worsens, changes or fails to improve as anticipated. He expressed understanding with the diagnosis(es) and plan. Reyes Schmid, who was evaluated through a patient-initiated, synchronous (real-time) audio-video encounter, and/or his healthcare decision maker, is aware that it is a billable service, with coverage as determined by his insurance carrier. He provided verbal consent to proceed: Yes, and patient identification was verified. It was conducted pursuant to the emergency declaration under the [address] [place] [address] authority and the [employer] and [employer] Act. A caregiver was present when appropriate. Ability to conduct physical exam was limited. I was in the office. The patient was at home.     This service was provided through telehealth, between patient Reyes Schmid participating from home and Trung León MD from [facility]     I discussed with the patient the nature of our telemedicine visits, that:      I would evaluate the patient and recommend diagnostics and treatments based on my assessment   Our sessions are not being recorded and that personal health information is protected   Our team would provide follow up care in person if/when the patient needs it    Aliza Huffman MD

## 2020-11-09 NOTE — ASSESSMENT & PLAN NOTE
Well Controlled, based on history, physical exam and review of pertinent labs, studies and medications; meds reconciled; continue current treatment plan, medication compliance emphasized. , This condition is managed by Specialist.  Key CAD CHF Meds             simvastatin (ZOCOR) 10 mg tablet (Taking) Take 1 Tab by mouth nightly. aspirin delayed-release 81 mg tablet (Taking) Take 81 mg by mouth daily. fish oil-omega-3 fatty acids 340-1,000 mg capsule (Taking) Take 2 Caps by mouth daily.         Lab Results   Component Value Date/Time    Sodium 140 01/23/2020 08:52 AM    Potassium 4.6 01/23/2020 08:52 AM    Cholesterol, total 197 01/23/2020 08:52 AM    HDL Cholesterol 36 01/23/2020 08:52 AM    LDL, calculated 124 01/23/2020 08:52 AM    Triglyceride 184 01/23/2020 08:52 AM

## 2020-11-09 NOTE — PROGRESS NOTES
Chief Complaint   Patient presents with    Cholesterol Problem    Blood sugar problem     1. Have you been to the ER, urgent care clinic since your last visit? Hospitalized since your last visit? No    2. Have you seen or consulted any other health care providers outside of the [address] [place] [address] since your last visit? Include any pap smears or colon screening.  No

## 2020-11-11 DIAGNOSIS — I47.1 PAT (PAROXYSMAL ATRIAL TACHYCARDIA) (HCC): ICD-10-CM

## 2020-11-11 DIAGNOSIS — E78.5 HYPERLIPIDEMIA LDL GOAL <100: ICD-10-CM

## 2020-11-11 DIAGNOSIS — R73.03 PREDIABETES: ICD-10-CM

## 2020-11-11 DIAGNOSIS — R74.8 ELEVATED CPK: ICD-10-CM

## 2020-11-11 LAB
ALBUMIN SERPL-MCNC: 4.2 G/DL (ref 3.5–5)
ALBUMIN/GLOB SERPL: 1.3 {RATIO} (ref 1.1–2.2)
ALP SERPL-CCNC: 66 U/L (ref 45–117)
ALT SERPL-CCNC: 36 U/L (ref 12–78)
ANION GAP SERPL CALC-SCNC: 7 MMOL/L (ref 5–15)
AST SERPL-CCNC: 24 U/L (ref 15–37)
BILIRUB SERPL-MCNC: 0.8 MG/DL (ref 0.2–1)
BUN SERPL-MCNC: 14 MG/DL (ref 6–20)
BUN/CREAT SERPL: 12 (ref 12–20)
CALCIUM SERPL-MCNC: 9.5 MG/DL (ref 8.5–10.1)
CHLORIDE SERPL-SCNC: 105 MMOL/L (ref 97–108)
CHOLEST SERPL-MCNC: 173 MG/DL
CK SERPL-CCNC: 284 U/L (ref 39–308)
CO2 SERPL-SCNC: 28 MMOL/L (ref 21–32)
COMMENT, HOLDF: NORMAL
CREAT SERPL-MCNC: 1.19 MG/DL (ref 0.7–1.3)
EST. AVERAGE GLUCOSE BLD GHB EST-MCNC: 123 MG/DL
GLOBULIN SER CALC-MCNC: 3.3 G/DL (ref 2–4)
GLUCOSE SERPL-MCNC: 89 MG/DL (ref 65–100)
HBA1C MFR BLD: 5.9 % (ref 4–5.6)
HDLC SERPL-MCNC: 41 MG/DL
HDLC SERPL: 4.2 {RATIO} (ref 0–5)
LDLC SERPL CALC-MCNC: 104.4 MG/DL (ref 0–100)
LIPID PROFILE,FLP: ABNORMAL
POTASSIUM SERPL-SCNC: 4.4 MMOL/L (ref 3.5–5.1)
PROT SERPL-MCNC: 7.5 G/DL (ref 6.4–8.2)
SAMPLES BEING HELD,HOLD: NORMAL
SODIUM SERPL-SCNC: 140 MMOL/L (ref 136–145)
TRIGL SERPL-MCNC: 138 MG/DL (ref ?–150)
VLDLC SERPL CALC-MCNC: 27.6 MG/DL

## 2020-11-12 NOTE — PROGRESS NOTES
Ulises Santizo,  I have reviewed your results  significant improvement in A1C from 6.4 to 5.9. good job. Please continue with Metformin, your diet and exercise  Cholesterol also shows improvement in bad cholesterol and Triglycerides,   CK, kidney and liver function are normal  Very reassuring results. No change in current medicines  Let me know if you have any questions.   thanks

## 2020-12-17 ENCOUNTER — OFFICE VISIT (OUTPATIENT)
Dept: CARDIOLOGY CLINIC | Age: 45
End: 2020-12-17
Payer: COMMERCIAL

## 2020-12-17 ENCOUNTER — CLINICAL SUPPORT (OUTPATIENT)
Dept: CARDIOLOGY CLINIC | Age: 45
End: 2020-12-17

## 2020-12-17 VITALS
SYSTOLIC BLOOD PRESSURE: 138 MMHG | RESPIRATION RATE: 16 BRPM | HEART RATE: 76 BPM | WEIGHT: 187 LBS | HEIGHT: 68 IN | BODY MASS INDEX: 28.34 KG/M2 | DIASTOLIC BLOOD PRESSURE: 86 MMHG

## 2020-12-17 DIAGNOSIS — I47.1 PAT (PAROXYSMAL ATRIAL TACHYCARDIA) (HCC): ICD-10-CM

## 2020-12-17 DIAGNOSIS — Z95.810 AUTOMATIC IMPLANTABLE CARDIAC DEFIBRILLATOR IN SITU: Primary | ICD-10-CM

## 2020-12-17 DIAGNOSIS — I49.8 BRUGADA SYNDROME: ICD-10-CM

## 2020-12-17 PROCEDURE — 99214 OFFICE O/P EST MOD 30 MIN: CPT | Performed by: INTERNAL MEDICINE

## 2020-12-17 NOTE — PROGRESS NOTES
Cardiac Electrophysiology Office Note     Subjective:      Irene Florentino is a 39 y. o.man who presents for follow up, is s/p Biotronik single lead ICD (DOI 02/02/2015). Device check today shows proper lead & generator function. Generator longevity estimated 55%. No pacing with VVI 50. Since 09/28/2020, 124 episodes of SVT, average duration 2 minutes. PAT/sinus tach 187 bpm noted on presenting strips. States he is doing well. Occasional brief lightheadedness, but denies syncope or near syncope since 02/2019. With the back of heart rate 50 bpm, the patient had less dizziness and no syncope    He denies chest pain, PND, orthopnea, or edema. Able to be active without difficulty. Previous:  Echo (02/02/2015): LVEF 55-60%, no RWMA. S/p Biotronik single lead ICD 02/02/2015 for Brugada syndrome with syncope. Asymptomatic with PAT.          Problem List  Date Reviewed: 8/27/2019          Codes Class Noted    PAT (paroxysmal atrial tachycardia) ([facility] [address] ICD-10-CM: I47.1  ICD-9-CM: 427.0  11/9/2020        Over weight ICD-10-CM: E66.3  ICD-9-CM: 278.02  6/19/2019        Neurocardiogenic syncope ICD-10-CM: R55  ICD-9-CM: 780.2  1/18/2019    Overview Signed 1/18/2019  1:03 PM by Matt Corrales MD     1/18/2019             Elevated CPK ICD-10-CM: R74.8  ICD-9-CM: 790.5  6/19/2017        Thyroid nodule ICD-10-CM: E04.1  ICD-9-CM: 241.0  10/21/2016        Prediabetes ICD-10-CM: R73.03  ICD-9-CM: 790.29  10/21/2016        S/P ICD (internal cardiac defibrillator) procedure ICD-10-CM: Z95.810  ICD-9-CM: V45.02  2/2/2015    Overview Signed 2/2/2015  7:26 PM by Matt Corrales MD     Single lead Biotronik ICD DX lead DFT < 25 J 2/2/2015             Brugada syndrome ICD-10-CM: I49.8  ICD-9-CM: 746.89  2/1/2015        Hyperlipidemia LDL goal <100 ICD-10-CM: E78.5  ICD-9-CM: 272.4  3/11/2011              Current Outpatient Medications   Medication Sig Dispense Refill    metFORMIN ER (GLUCOPHAGE XR) 500 mg tablet Take 1 Tab by mouth daily (with dinner). 90 Tab 1    simvastatin (ZOCOR) 10 mg tablet Take 1 Tab by mouth nightly. 90 Tab 1    calcium-vitamin D (OYSTER SHELL) 500 mg(1,250mg) -200 unit per tablet Take 1 Tab by mouth daily.  fexofenadine (ALLEGRA) 180 mg tablet Take 180 mg by mouth daily as needed for Allergies.  aspirin delayed-release 81 mg tablet Take 81 mg by mouth daily.  fish oil-omega-3 fatty acids 340-1,000 mg capsule Take 2 Caps by mouth daily. No Known Allergies  Past Medical History:   Diagnosis Date    Brugada syndrome     High cholesterol     Hypercholesteremia     Seizures (HCC)      Past Surgical History:   Procedure Laterality Date    INS PPM/ICD LED SING ONLY  2/2/2015          Family History   Problem Relation Age of Onset    Diabetes Father     Elevated Lipids Father     Hypertension Father     Heart Disease Father     Hypertension Mother     Elevated Lipids Brother     Hypertension Brother      Social History     Tobacco Use    Smoking status: Never Smoker    Smokeless tobacco: Never Used   Substance Use Topics    Alcohol use: No     Alcohol/week: 0.0 standard drinks        Review of Systems: All other review of systems otherwise normal.  Constitutional: Negative for fever, chills, weight loss, malaise/fatigue. HEENT: Negative for nosebleeds, vision changes. Respiratory: Negative for cough, hemoptysis, sputum production, and wheezing. Cardiovascular: Negative for chest pain, palpitations, orthopnea, claudication, leg swelling, syncope, and PND. Gastrointestinal: Negative for nausea, vomiting, diarrhea, constipation, blood in stool and melena. Genitourinary: Negative for dysuria, and hematuria. Musculoskeletal: Negative for myalgias, arthralgia. Skin: Negative for rash. Heme: Does not bleed or bruise easily.    Neurological: Negative for speech change and focal weakness     Objective:     Visit Vitals  /86 (BP 1 Location: Left arm, BP Patient Position: Sitting)   Pulse 76   Resp 16   Ht 5' 8\" (1.727 m)   Wt 187 lb (84.8 kg)   BMI 28.43 kg/m²      Physical Exam:   Constitutional: Well-developed and well-nourished. No distress. Head: Normocephalic and atraumatic. Eyes: Pupils are equal, round  Neck: Supple. No JVD present. Cardiovascular: Normal rate, regular rhythm and normal heart sounds. Exam reveals no gallop and no friction rub. No murmur heard. Pulmonary/Chest: Effort normal and breath sounds normal. No wheezes. Abdominal: Soft, no tenderness. Musculoskeletal: Moves extremities independently. Normal gait. Vasc/lymphatic: No edema. Neurological: Alert, oriented. Skin: Skin is warm and dry. Left chest ICD site well healed. Psychiatric: Normal mood and affect. Behavior is normal. Judgment and thought content normal.          Assessment/Plan:       ICD-10-CM ICD-9-CM    1. Automatic implantable cardiac defibrillator in situ  Z95.810 V45.02    2. Brugada syndrome  I49.8 746.89    3. PAT (paroxysmal atrial tachycardia) ([facility])  I47.1 427.0      Biotronik single lead ICD (DOI 02/02/2015) check today shows proper lead & generator function. Generator longevity estimated 55%. No pacing with VVI 50. Since 09/28/2020, 124 episodes of SVT, average duration 2 minutes. PAT/sinus tach 187 bpm noted on presenting strips. History of Brugada syndrome, but no recent VT. If the patient has significant symptomatic sinus bradycardia, I would recommend upgrade of the single-chamber ICD to dual-chamber ICD. So far with VVI 50 bpm, the amount right ventricular pacing was 0% and the battery of the defibrillator is classified as middle of life. It was implanted in 2015    LVEF WNL in 2015 via echo. Asymptomatic with PAT, no medication indicated for control at this time. Check Biotronik ICD remotely every 3 months. EP clinic follow up in 1 year.     Future Appointments   Date Time Provider Janel Foote   4/14/2021  2:00 PM REMOTE1, ANA RIVERO AMB   7/21/2021  2:15 PM REMOTE1, ANA RIVERO AMB   10/27/2021  1:00 PM REMOTE1, ANA RIVERO AMB   1/27/2022  2:40 PM PACEMAKER3, ANA RIVERO AMB   1/27/2022  3:00 PM MD DORIAN Apodaca BS AMB           Thank you for involving me in this patient's care and please call with further concerns or questions. Chaya Carter M.D.   Electrophysiology/Cardiology  [facility] [place]  [address] [place] [address] 91  [address]                             [address] [place] [address] Road [address] [phone]

## 2020-12-18 DIAGNOSIS — R73.03 PREDIABETES: ICD-10-CM

## 2020-12-18 RX ORDER — METFORMIN HYDROCHLORIDE 500 MG/1
500 TABLET, EXTENDED RELEASE ORAL
Qty: 90 TAB | Refills: 1 | Status: SHIPPED | OUTPATIENT
Start: 2020-12-18 | End: 2021-06-28 | Stop reason: SDUPTHER

## 2020-12-18 RX ORDER — METFORMIN HYDROCHLORIDE 500 MG/1
TABLET ORAL
Qty: 180 TAB | Refills: 0 | OUTPATIENT
Start: 2020-12-18

## 2021-03-01 RX ORDER — SIMVASTATIN 10 MG/1
10 TABLET, FILM COATED ORAL
Qty: 90 TAB | Refills: 1 | Status: SHIPPED | OUTPATIENT
Start: 2021-03-01 | End: 2022-01-05

## 2021-03-24 ENCOUNTER — VIRTUAL VISIT (OUTPATIENT)
Dept: FAMILY MEDICINE CLINIC | Age: 46
End: 2021-03-24
Payer: COMMERCIAL

## 2021-03-24 DIAGNOSIS — R73.03 PREDIABETES: ICD-10-CM

## 2021-03-24 DIAGNOSIS — M62.838 TRAPEZIUS MUSCLE SPASM: ICD-10-CM

## 2021-03-24 DIAGNOSIS — I47.1 PAT (PAROXYSMAL ATRIAL TACHYCARDIA) (HCC): ICD-10-CM

## 2021-03-24 DIAGNOSIS — E78.5 HYPERLIPIDEMIA LDL GOAL <100: Primary | ICD-10-CM

## 2021-03-24 PROCEDURE — 99213 OFFICE O/P EST LOW 20 MIN: CPT | Performed by: FAMILY MEDICINE

## 2021-03-24 NOTE — PATIENT INSTRUCTIONS
Neck Spasm: Exercises Introduction Here are some examples of exercises for you to try. The exercises may be suggested for a condition or for rehabilitation. Start each exercise slowly. Ease off the exercises if you start to have pain. You will be told when to start these exercises and which ones will work best for you. How to do the exercises Levator scapula stretch 1. Sit in a firm chair, or stand up straight. 2. Gently tilt your head toward your left shoulder. 3. Turn your head to look down into your armpit, bending your head slightly forward. Let the weight of your head stretch your neck muscles. 4. Hold for 15 to 30 seconds. 5. Return to your starting position. 6. Follow the same instructions above, but tilt your head toward your right shoulder. 7. Repeat 2 to 4 times toward each shoulder. Upper trapezius stretch 1. Sit in a firm chair, or stand up straight. 2. This stretch works best if you keep your shoulder down as you lean away from it. To help you remember to do this, start by relaxing your shoulders and lightly holding on to your thighs or your chair. 3. Tilt your head toward your shoulder and hold for 15 to 30 seconds. Let the weight of your head stretch your muscles. 4. If you would like a little added stretch, place your arm behind your back. Use the arm opposite of the direction you are tilting your head. For example, if you are tilting your head to the left, place your right arm behind your back. 5. Repeat 2 to 4 times toward each shoulder. Neck rotation 1. Sit in a firm chair, or stand up straight. 2. Keeping your chin level, turn your head to the right, and hold for 15 to 30 seconds. 3. Turn your head to the left, and hold for 15 to 30 seconds. 4. Repeat 2 to 4 times to each side. Chin tuck 1. Lie on the floor with a rolled-up towel under your neck. Your head should be touching the floor. 2. Slowly bring your chin toward the front of your neck.  
3. Hold for a count of 6, and then relax for up to 10 seconds. 4. Repeat 8 to 12 times. Forward neck flexion 1. Sit in a firm chair, or stand up straight. 2. Bend your head forward. 3. Hold for 15 to 30 seconds, then return to your starting position. 4. Repeat 2 to 4 times. Follow-up care is a key part of your treatment and safety. Be sure to make and go to all appointments, and call your doctor if you are having problems. It's also a good idea to know your test results and keep a list of the medicines you take. Where can you learn more? Go to http://www.gray.com/ Enter P962 in the search box to learn more about \"Neck Spasm: Exercises. \" Current as of: March 2, 2020               Content Version: 12.6 © 5743-0641 [employer]. Care instructions adapted under license by [employer] (which disclaims liability or warranty for this information). If you have questions about a medical condition or this instruction, always ask your healthcare professional. [address] any warranty or liability for your use of this information.

## 2021-03-24 NOTE — PROGRESS NOTES
Law Andres is a 39 y.o. male who was seen by synchronous (real-time) audio-video technology on 3/24/2021 for Cholesterol Problem, High Blood Sugar, and Neck Pain        Assessment & Plan:   Diagnoses and all orders for this visit:    1. Hyperlipidemia LDL goal <100  -     LIPID PANEL; Future  -     METABOLIC PANEL, COMPREHENSIVE; Future    2. PAT (paroxysmal atrial tachycardia) (HCC)  -     LIPID PANEL; Future    3. Prediabetes  -     METABOLIC PANEL, COMPREHENSIVE; Future    4. Trapezius muscle spasm        I spent at least 20 minutes on this visit with this established patient. Is doing significantly well with diet and exercise. Encouraged to continue. Discussed possible diagnosis of trapezius muscle strain or spasm and demonstrated neck stretches and upper back exercises on video visit. Recommended to use heating pad and OTC Tylenol along with home exercises, and if no improvement, will see him in office. Subjective:   Cardiovascular Review  The patient has hyperlipidemia and Brugada sx, s/p AICD.  He reports taking medications as instructed, no medication side effects noted, no chest pain on exertion, no dyspnea on exertion, no swelling of ankles, no orthostatic dizziness or lightheadedness, no orthopnea or paroxysmal nocturnal dyspnea, no palpitations, no intermittent claudication symptoms, no muscle aches or pain.  Diet and Lifestyle: generally follows a low fat low cholesterol diet, generally follows a low sodium diet, follows a diabetic diet regularly, exercises regularly.  Lab review: labs reviewed and discussed with patient.    Medicines: On Simvastatin 10 mg and Fish oil  He follows cardiology on annual bases and gets AICD interrogation and monitoring remotely. He has side effect of statin in form pf elevated CPK.   Is on Simvastatin 10 mg.       Endocrine Review  He is seen for prediabetes and thyroid enlargement.  Since last visit he reports: no polyuria or polydipsia, no chest pain, dyspnea or TIA's, no numbness, tingling or pain in extremities, no unusual visual symptoms, no hypoglycemia, no significant changes.  Home glucose monitoring: is not performed  no  n/a - not on medications (diet controlled).  Medication side effects: n/a.  Diabetic diet compliance: compliant all of the time.  Lab review: labs reviewed and discussed with patient.  Eye exam: UTD.    Started on Metformin  mg      Neck Pain  Patient complains of neck pain. Onset of symptoms was a few weeks ago, stable since that time. Current symptoms are pain in right side neck and upper back (aching, stiffness in character; 5/10 in severity), stiffness in neck and upper back, denies weakness, denies numbness, denies paresthesias. Patient denies numbness, tingling, paresthesias in upper extremities. Patient denies weakness, diminished  strength, lack of coordination. Radiation of pain:  Event that precipitate these symptoms: Likely from sitting on the computer whole day and using right hand for keyboard and mouse. . Patient has had no prior neck problems. Previous treatments include: none    . Prior to Admission medications    Medication Sig Start Date End Date Taking? Authorizing Provider   simvastatin (ZOCOR) 10 mg tablet Take 1 Tab by mouth nightly. 3/1/21  Yes Rachel Pallas, MD   metFORMIN ER (GLUCOPHAGE XR) 500 mg tablet Take 1 Tab by mouth daily (with dinner). 12/18/20  Yes Rachel Pallas, MD   calcium-vitamin D (OYSTER SHELL) 500 mg(1,250mg) -200 unit per tablet Take 1 Tab by mouth daily. Yes Provider, Historical   fexofenadine (ALLEGRA) 180 mg tablet Take 180 mg by mouth daily as needed for Allergies. Yes Provider, Historical   aspirin delayed-release 81 mg tablet Take 81 mg by mouth daily. Yes Provider, Historical   fish oil-omega-3 fatty acids 340-1,000 mg capsule Take 2 Caps by mouth daily.    Yes Provider, Historical     Patient Active Problem List    Diagnosis Date Noted    PAT (paroxysmal atrial tachycardia) ([facility] [address] 11/09/2020    Over weight 06/19/2019    Neurocardiogenic syncope 01/18/2019    Elevated CPK 06/19/2017    Thyroid nodule 10/21/2016    Prediabetes 10/21/2016    S/P ICD (internal cardiac defibrillator) procedure 02/02/2015    Brugada syndrome 02/01/2015    Hyperlipidemia LDL goal <100 03/11/2011     Current Outpatient Medications   Medication Sig Dispense Refill    simvastatin (ZOCOR) 10 mg tablet Take 1 Tab by mouth nightly. 90 Tab 1    metFORMIN ER (GLUCOPHAGE XR) 500 mg tablet Take 1 Tab by mouth daily (with dinner). 90 Tab 1    calcium-vitamin D (OYSTER SHELL) 500 mg(1,250mg) -200 unit per tablet Take 1 Tab by mouth daily.  fexofenadine (ALLEGRA) 180 mg tablet Take 180 mg by mouth daily as needed for Allergies.  aspirin delayed-release 81 mg tablet Take 81 mg by mouth daily.  fish oil-omega-3 fatty acids 340-1,000 mg capsule Take 2 Caps by mouth daily. No Known Allergies  Past Medical History:   Diagnosis Date    Brugada syndrome     High cholesterol     Hypercholesteremia     Seizures (HCC)      Past Surgical History:   Procedure Laterality Date    INS PPM/ICD LED SING ONLY  2/2/2015          Family History   Problem Relation Age of Onset    Diabetes Father     Elevated Lipids Father     Hypertension Father     Heart Disease Father     Hypertension Mother     Elevated Lipids Brother     Hypertension Brother      Social History     Tobacco Use    Smoking status: Never Smoker    Smokeless tobacco: Never Used   Substance Use Topics    Alcohol use: No     Alcohol/week: 0.0 standard drinks       Review of Systems   Constitutional: Negative for chills, fever and malaise/fatigue. HENT: Negative for congestion, ear pain, sore throat and tinnitus. Eyes: Negative for blurred vision, double vision, pain and discharge. Respiratory: Negative for cough, shortness of breath and wheezing. Cardiovascular: Negative for chest pain, palpitations and leg swelling. Gastrointestinal: Negative for abdominal pain, blood in stool, constipation, diarrhea, nausea and vomiting. Genitourinary: Negative for dysuria, frequency, hematuria and urgency. Musculoskeletal: Positive for back pain (right upper back) and neck pain. Negative for joint pain and myalgias. Skin: Negative for rash. Neurological: Negative for dizziness, tremors, seizures and headaches. Endo/Heme/Allergies: Negative for polydipsia. Does not bruise/bleed easily. Psychiatric/Behavioral: Negative for depression and substance abuse. The patient is not nervous/anxious.         Objective:     Patient-Reported Vitals 3/24/2021   Patient-Reported Weight 180   Patient-Reported Height 510   Patient-Reported Pulse 73   Patient-Reported Temperature 97   Patient-Reported SpO2 97   Patient-Reported Systolic  397   Patient-Reported Diastolic 80        [INSTRUCTIONS:  \"[x]\" Indicates a positive item  \"[]\" Indicates a negative item  -- DELETE ALL ITEMS NOT EXAMINED]    Constitutional: [x] Appears well-developed and well-nourished [x] No apparent distress      [] Abnormal -     Mental status: [x] Alert and awake  [x] Oriented to person/place/time [x] Able to follow commands    [] Abnormal -     Eyes:   EOM    [x]  Normal    [] Abnormal -   Sclera  [x]  Normal    [] Abnormal -          Discharge [x]  None visible   [] Abnormal -     HENT: [x] Normocephalic, atraumatic  [] Abnormal -   [x] Mouth/Throat: Mucous membranes are moist    External Ears [x] Normal  [] Abnormal -    Neck: [x] No visualized mass [] Abnormal -     Pulmonary/Chest: [x] Respiratory effort normal   [x] No visualized signs of difficulty breathing or respiratory distress        [] Abnormal -      Musculoskeletal:   [x] Normal gait with no signs of ataxia         [x] Normal range of motion of neck        [] Abnormal -     Neurological:        [x] No Facial Asymmetry (Cranial nerve 7 motor function) (limited exam due to video visit)          [x] No gaze palsy        [] Abnormal -          Skin:        [x] No significant exanthematous lesions or discoloration noted on facial skin         [] Abnormal -            Psychiatric:       [x] Normal Affect [] Abnormal -        [x] No Hallucinations    Other pertinent observable physical exam findings:-        We discussed the expected course, resolution and complications of the diagnosis(es) in detail. Medication risks, benefits, costs, interactions, and alternatives were discussed as indicated. I advised him to contact the office if his condition worsens, changes or fails to improve as anticipated. He expressed understanding with the diagnosis(es) and plan. Toni Rich, was evaluated through a synchronous (real-time) audio-video encounter. The patient (or guardian if applicable) is aware that this is a billable service. Verbal consent to proceed has been obtained within the past 12 months. The visit was conducted pursuant to the emergency declaration under the [address] [place] [address], [address] [place] [address] authority and the [employer] General Act. Patient identification was verified, and a caregiver was present when appropriate. The patient was located in a state where the provider was credentialed to provide care.     This service was provided through telehealth, between patient Toni Rich participating from [place] and Amando Brown MD from [facility]     I discussed with the patient the nature of our telemedicine visits, that:      I would evaluate the patient and recommend diagnostics and treatments based on my assessment   Our sessions are not being recorded and that personal health information is protected   Our team would provide follow up care in person if/when the patient needs it    Say Gonzalez MD

## 2021-03-24 NOTE — ASSESSMENT & PLAN NOTE
Stable, based on history, physical exam and review of pertinent labs, studies and medications; meds reconciled; continue current treatment plan, medication compliance emphasized, s/p ICD. , This condition is managed by Specialist.

## 2021-04-01 DIAGNOSIS — R73.03 PREDIABETES: Primary | ICD-10-CM

## 2021-04-13 NOTE — TELEPHONE ENCOUNTER
Last Visit: VV 11/19/20 MD Saritha Giordano, labs done  Next Appointment: Not scheduled  Previous Refill Encounter(s): 5/26/20 90 + 1      Requested Prescriptions     Pending Prescriptions Disp Refills    simvastatin (ZOCOR) 10 mg tablet 90 Tab 1     Sig: Take 1 Tab by mouth nightly. Opt out

## 2021-04-14 ENCOUNTER — OFFICE VISIT (OUTPATIENT)
Dept: CARDIOLOGY CLINIC | Age: 46
End: 2021-04-14
Payer: COMMERCIAL

## 2021-04-14 DIAGNOSIS — Z95.810 AUTOMATIC IMPLANTABLE CARDIAC DEFIBRILLATOR IN SITU: Primary | ICD-10-CM

## 2021-04-14 PROCEDURE — 93295 DEV INTERROG REMOTE 1/2/MLT: CPT | Performed by: INTERNAL MEDICINE

## 2021-04-14 PROCEDURE — 93296 REM INTERROG EVL PM/IDS: CPT | Performed by: INTERNAL MEDICINE

## 2021-06-17 ENCOUNTER — OFFICE VISIT (OUTPATIENT)
Dept: FAMILY MEDICINE CLINIC | Age: 46
End: 2021-06-17
Payer: COMMERCIAL

## 2021-06-17 VITALS
HEIGHT: 68 IN | RESPIRATION RATE: 16 BRPM | SYSTOLIC BLOOD PRESSURE: 119 MMHG | WEIGHT: 189.2 LBS | DIASTOLIC BLOOD PRESSURE: 72 MMHG | OXYGEN SATURATION: 97 % | BODY MASS INDEX: 28.67 KG/M2 | TEMPERATURE: 97.9 F | HEART RATE: 74 BPM

## 2021-06-17 DIAGNOSIS — S46.811A TRAPEZIUS MUSCLE STRAIN, RIGHT, INITIAL ENCOUNTER: ICD-10-CM

## 2021-06-17 DIAGNOSIS — M77.8 SUBSCAPULARIS TENDINITIS OF RIGHT SHOULDER: Primary | ICD-10-CM

## 2021-06-17 PROCEDURE — 20552 NJX 1/MLT TRIGGER POINT 1/2: CPT | Performed by: FAMILY MEDICINE

## 2021-06-17 PROCEDURE — 99213 OFFICE O/P EST LOW 20 MIN: CPT | Performed by: FAMILY MEDICINE

## 2021-06-17 RX ORDER — TRIAMCINOLONE ACETONIDE 40 MG/ML
40 INJECTION, SUSPENSION INTRA-ARTICULAR; INTRAMUSCULAR ONCE
Qty: 2 ML | Refills: 0
Start: 2021-06-17 | End: 2021-06-17

## 2021-06-17 NOTE — PROGRESS NOTES
HISTORY OF PRESENT ILLNESS  Isaias Jones is a 39 y.o. male. Patient was seen today for right shoulder pain for last 3 months. HPI  Shoulder Pain  Patient complains of right side shoulder pain. The symptoms began 3 months ago Course of symptoms since onset has been gradually worsening. Pain is described as overall severity = moderate, location: poorly localized, right upper back between neck and shoulder and right armpit, worse with overhead movements, worse at night and other associated symptoms: pain radiating to arm/hand, interference with performing ADL's and lifestyle duties. Symptoms were incited by repetitive activity, likely by working in the gym. Patient denies fever. Therapy to date includes PT: not very effective. Began 3 months ago. He is getting regular physical therapy but there is no improvement in pain. Review of Systems   Constitutional: Negative for chills, fever and malaise/fatigue. HENT: Negative for congestion, ear pain, sore throat and tinnitus. Eyes: Negative for blurred vision, double vision, pain and discharge. Respiratory: Negative for cough, shortness of breath and wheezing. Cardiovascular: Negative for chest pain, palpitations and leg swelling. Gastrointestinal: Negative for abdominal pain, blood in stool, constipation, diarrhea, nausea and vomiting. Genitourinary: Negative for dysuria, frequency, hematuria and urgency. Musculoskeletal: Positive for back pain. Negative for joint pain and myalgias. Right shoulder pain   Skin: Negative for rash. Neurological: Negative for dizziness, tremors, seizures and headaches. Endo/Heme/Allergies: Negative for polydipsia. Does not bruise/bleed easily. Psychiatric/Behavioral: Negative for depression and substance abuse. The patient is not nervous/anxious. Physical Exam  Vitals and nursing note reviewed. Constitutional:       Appearance: He is well-developed. He is not diaphoretic.    HENT:      Head: Normocephalic and atraumatic. Right Ear: External ear normal.      Mouth/Throat:      Pharynx: No oropharyngeal exudate. Eyes:      General: No scleral icterus. Conjunctiva/sclera: Conjunctivae normal.      Pupils: Pupils are equal, round, and reactive to light. Neck:      Thyroid: No thyromegaly. Vascular: No JVD. Cardiovascular:      Rate and Rhythm: Normal rate and regular rhythm. Heart sounds: Normal heart sounds. No murmur heard. Pulmonary:      Effort: Pulmonary effort is normal.      Breath sounds: Normal breath sounds. No wheezing. Abdominal:      General: Bowel sounds are normal. There is no distension. Palpations: Abdomen is soft. There is no mass. Musculoskeletal:         General: No tenderness. Normal range of motion. Arms:       Cervical back: Normal range of motion and neck supple. Back:       Comments: Right Shoulder- Empty Can Test: Negative. Drop Arm Test: Negative. Cross-Chest Test: Positive. NEER test Negative  Marvin' test Negative     Lymphadenopathy:      Cervical: No cervical adenopathy. Skin:     General: Skin is warm and dry. Findings: No rash. Neurological:      Mental Status: He is alert and oriented to person, place, and time. Cranial Nerves: No cranial nerve deficit. Deep Tendon Reflexes: Reflexes are normal and symmetric. Reflexes normal.         ASSESSMENT and PLAN  Diagnoses and all orders for this visit:    1. Subscapularis tendinitis of right shoulder  -     TRIAMCINOLONE ACETONIDE INJ  -     triamcinolone acetonide (Kenalog) 40 mg/mL injection; 1 mL by IntraBURSal route once for 1 dose. -     INJECT TRIGGER POINT, 1 OR 2    2. Trapezius muscle strain, right, initial encounter  -     TRIAMCINOLONE ACETONIDE INJ  -     triamcinolone acetonide (Kenalog) 40 mg/mL injection; 1 mL by IntraBURSal route once for 1 dose.   -     INJECT TRIGGER POINT, 1 OR 2    Procedure Note  Procedure: Joint injection  Location: back - right  Time: 2:45 PM    Procedure explained to patient. Time out performed:  * Agreement on procedure being performed was verified  * Risks and Benefits explained to the patient  * Procedure site verified and marked as necessary  * Patient was positioned for comfort  * Consent was signed and verified    Pain level prior to procedure: 8/10  Pain level after procedure: 3/10    The area was sterilized with alcohol. 1 cc of 40 mg/mL triamcinolone was injected on 2 different sites at maximum tenderness. Patient tolerated the procedure well. Hemostasis was obtained and a bandage placed. Wound care discussed with patient. Red flags were reviewed with the patient to RTC or notify me. Discussed lifestyle issues and health guidance given  Patient was given an after visit summary which includes diagnoses, vital signs, current medications, instructions and references & authorized prescriptions . Results of labs will be conveyed to patient, once available. Pt verbalized instructions I provided and expressed understanding of discussion that was held today. Follow-up and Dispositions    · Return for follow up.

## 2021-06-17 NOTE — PATIENT INSTRUCTIONS
Learning About Trigger Point Injections What are trigger point injections? A trigger point is a painful knot in a tight band of muscle. A trigger point often causes pain to be felt in other areas, too. For example, a trigger point in the neck or upper back can cause pain in the head. Trigger point injections are shots of medicine into these knots to help relieve the pain. The medicines are usually local anesthetics like lidocaine. Trigger point injections are often part of plan that includes other treatments, such as muscle stretching and strengthening. How is a trigger point injection done? Your doctor first locates a trigger point by pressing around the painful area. This may cause your muscle to hurt or twitch. This tells the doctor that it's the right spot to do the injection. The area is cleaned. Your doctor then injects the medicine into the trigger point. The doctor may inject the medicine in more than one direction within the trigger point. The doctor may change direction without removing the needle. If you have more than one trigger point in the muscle, your doctor may repeat the process. Your doctor may stretch the area to help the muscle relax. You may be shown how to move and stretch the muscle yourself. How long does a trigger point injection take? The procedure takes about 10 to 30 minutes. How long it takes depends on how many trigger points are treated. But the injection itself takes only a few moments. What can you expect after a trigger point injection? The area may feel a bit numb for a few hours. It may also feel sore. Other problems from trigger point injections are rare. There is a chance of a skin infection at the injection site. And if injections are done in the chest area, there is a small risk of puncturing the outer lining of the lung (pneumothorax). Trigger point injections may reduce some or all of your pain. But the pain can come back after the medicine wears off. If your pain comes back, your doctor may suggest more shots or other treatment for longer-lasting relief. Follow your doctor's instructions carefully. And tell your doctor about any new or unusual symptoms, such as chest pain or shortness of breath. Follow-up care is a key part of your treatment and safety. Be sure to make and go to all appointments, and call your doctor if you are having problems. It's also a good idea to know your test results and keep a list of the medicines you take. Where can you learn more? Go to http://www.gray.com/ Enter [phone] [id number] [phone] in the search box to learn more about \"Learning About Trigger Point Injections. \" Current as of: November 16, 2020               Content Version: 12.8 © 1089-2168 [employer]. Care instructions adapted under license by [employer] (which disclaims liability or warranty for this information). If you have questions about a medical condition or this instruction, always ask your healthcare professional. [address] any warranty or liability for your use of this information.

## 2021-06-17 NOTE — PROGRESS NOTES
Chief Complaint   Patient presents with    Shoulder Pain     right shoulder x 3 months       1. Have you been to the ER, urgent care clinic since your last visit? Hospitalized since your last visit? No    2. Have you seen or consulted any other health care providers outside of the [address] [place] [address] since your last visit? Include any pap smears or colon screening.  No      3 most recent PHQ Screens 6/17/2021   Little interest or pleasure in doing things Not at all   Feeling down, depressed, irritable, or hopeless Not at all   Total Score PHQ 2 0       Health Maintenance Due   Topic Date Due    Hepatitis C Screening  Never done

## 2021-06-28 DIAGNOSIS — R73.03 PREDIABETES: ICD-10-CM

## 2021-06-28 RX ORDER — METFORMIN HYDROCHLORIDE 500 MG/1
500 TABLET, EXTENDED RELEASE ORAL
Qty: 90 TABLET | Refills: 1 | Status: SHIPPED | OUTPATIENT
Start: 2021-06-28 | End: 2022-01-05

## 2021-07-21 ENCOUNTER — OFFICE VISIT (OUTPATIENT)
Dept: CARDIOLOGY CLINIC | Age: 46
End: 2021-07-21
Payer: COMMERCIAL

## 2021-07-21 DIAGNOSIS — Z95.810 AUTOMATIC IMPLANTABLE CARDIAC DEFIBRILLATOR IN SITU: Primary | ICD-10-CM

## 2021-07-21 PROCEDURE — 93296 REM INTERROG EVL PM/IDS: CPT | Performed by: INTERNAL MEDICINE

## 2021-08-31 ENCOUNTER — OFFICE VISIT (OUTPATIENT)
Dept: FAMILY MEDICINE CLINIC | Age: 46
End: 2021-08-31
Payer: COMMERCIAL

## 2021-08-31 ENCOUNTER — TELEPHONE (OUTPATIENT)
Dept: FAMILY MEDICINE CLINIC | Age: 46
End: 2021-08-31

## 2021-08-31 VITALS
SYSTOLIC BLOOD PRESSURE: 123 MMHG | TEMPERATURE: 97.2 F | RESPIRATION RATE: 15 BRPM | HEART RATE: 89 BPM | OXYGEN SATURATION: 97 % | DIASTOLIC BLOOD PRESSURE: 77 MMHG | BODY MASS INDEX: 28.04 KG/M2 | HEIGHT: 68 IN | WEIGHT: 185 LBS

## 2021-08-31 DIAGNOSIS — M77.8 SUBSCAPULARIS TENDINITIS OF RIGHT SHOULDER: Primary | ICD-10-CM

## 2021-08-31 DIAGNOSIS — M94.0 COSTOCHONDRITIS, ACUTE: ICD-10-CM

## 2021-08-31 PROCEDURE — 99213 OFFICE O/P EST LOW 20 MIN: CPT | Performed by: FAMILY MEDICINE

## 2021-08-31 RX ORDER — DICLOFENAC SODIUM 10 MG/G
2 GEL TOPICAL 4 TIMES DAILY
Qty: 100 G | Refills: 1 | Status: SHIPPED | OUTPATIENT
Start: 2021-08-31 | End: 2021-12-02 | Stop reason: ALTCHOICE

## 2021-08-31 NOTE — PROGRESS NOTES
HISTORY OF PRESENT ILLNESS  Renuka Gaxiola is a 39 y.o. male. Patient was seen today for persistent pain on right upper back. HPI  Shoulder Pain  Patient complains of right side shoulder pain. The symptoms began 4 months ago Course of symptoms since onset has been gradually worsening. Pain is described as overall severity = moderate, location: poorly localized, right upper back between neck and shoulder and right armpit, worse with overhead movements, worse at night and other associated symptoms: pain radiating to arm/hand, interference with performing ADL's and lifestyle duties. Symptoms were incited by repetitive activity, likely by working in the gym. Patient denies fever. Therapy to date includes  trigger injection done almost 10 weeks ago and PT: not very effective. He is getting regular physical therapy but there is no improvement in pain. Review of Systems   Constitutional: Negative for chills, fever and malaise/fatigue. HENT: Negative for congestion, ear pain, sore throat and tinnitus. Eyes: Negative for blurred vision, double vision, pain and discharge. Respiratory: Negative for cough, shortness of breath and wheezing. Cardiovascular: Negative for chest pain, palpitations and leg swelling. Gastrointestinal: Negative for abdominal pain, blood in stool, constipation, diarrhea, nausea and vomiting. Genitourinary: Negative for dysuria, frequency, hematuria and urgency. Musculoskeletal: Negative for back pain, joint pain and myalgias. Right-sided upper back pain   Skin: Negative for rash. Neurological: Negative for dizziness, tremors, seizures and headaches. Endo/Heme/Allergies: Negative for polydipsia. Does not bruise/bleed easily. Psychiatric/Behavioral: Negative for depression and substance abuse. The patient is not nervous/anxious. Physical Exam  Vitals and nursing note reviewed. Constitutional:       Appearance: He is well-developed. He is not diaphoretic. HENT:      Head: Normocephalic and atraumatic. Right Ear: External ear normal.      Mouth/Throat:      Pharynx: No oropharyngeal exudate. Eyes:      General: No scleral icterus. Conjunctiva/sclera: Conjunctivae normal.      Pupils: Pupils are equal, round, and reactive to light. Neck:      Thyroid: No thyromegaly. Vascular: No JVD. Cardiovascular:      Rate and Rhythm: Normal rate and regular rhythm. Heart sounds: Normal heart sounds. No murmur heard. Pulmonary:      Effort: Pulmonary effort is normal.      Breath sounds: Normal breath sounds. No wheezing. Comments: Right Shoulder- Empty Can Test: Negative. Drop Arm Test: Negative. Cross-Chest Test: Negative. NEER test Negative  Marvin' test Negative    Abdominal:      General: Bowel sounds are normal. There is no distension. Palpations: Abdomen is soft. There is no mass. Musculoskeletal:         General: No tenderness. Normal range of motion. Cervical back: Normal range of motion and neck supple. Lymphadenopathy:      Cervical: No cervical adenopathy. Skin:     General: Skin is warm and dry. Findings: No rash. Neurological:      Mental Status: He is alert and oriented to person, place, and time. Cranial Nerves: No cranial nerve deficit. Deep Tendon Reflexes: Reflexes are normal and symmetric. Reflexes normal.         ASSESSMENT and PLAN  Diagnoses and all orders for this visit:    1. Subscapularis tendinitis of right shoulder  -     diclofenac (VOLTAREN) 1 % gel; Apply 2 g to affected area four (4) times daily. Apply over right side ribs and upper back    2. Costochondritis, acute  -     diclofenac (VOLTAREN) 1 % gel; Apply 2 g to affected area four (4) times daily. Apply over right side ribs and upper back      Patient with subscapularis tendinitis and likely costochondritis. Not responding to physical therapy very well. Recommended to use heating pad along with topical NSAIDs.   (He cannot take oral NSAIDs due to underlying heart condition)  Discussed lifestyle issues and health guidance given  Patient was given an after visit summary which includes diagnoses, vital signs, current medications, instructions and references & authorized prescriptions . Results of labs will be conveyed to patient, once available. Pt verbalized instructions I provided and expressed understanding of discussion that was held today. Please note that this dictation was completed with [employer], the computer voice recognition software. Quite often unanticipated grammatical, syntax, homophones, and other interpretive errors are inadvertently transcribed by the computer software. Please disregard these errors. Please excuse any errors that have escaped final proofreading. Thank you.

## 2021-08-31 NOTE — TELEPHONE ENCOUNTER
diclofenac (VOLTAREN) 1 % gel     Pt says insurance is not going to cover medication unless Dr. Randall Quezada give an over ride please call and advise.  Pt is traveling so would like to get this taken care of as soon as possible

## 2021-08-31 NOTE — PATIENT INSTRUCTIONS

## 2021-08-31 NOTE — PROGRESS NOTES
Chief Complaint   Patient presents with    Shoulder Pain     right shoulder pain       1. Have you been to the ER, urgent care clinic since your last visit? Hospitalized since your last visit? No    2. Have you seen or consulted any other health care providers outside of the [address] [place] [address] since your last visit? Include any pap smears or colon screening.  No        3 most recent PHQ Screens 8/31/2021   Little interest or pleasure in doing things Not at all   Feeling down, depressed, irritable, or hopeless Not at all   Total Score PHQ 2 0       Health Maintenance Due   Topic Date Due    Hepatitis C Screening  Never done    Colorectal Cancer Screening Combo  Never done

## 2021-08-31 NOTE — TELEPHONE ENCOUNTER
Outbound call to patient. Name and  verified. Informed that he can get OTC diclofenac gel. It is available in [facility]. He stated understanding.

## 2021-09-02 NOTE — TELEPHONE ENCOUNTER
Chief Complaint   Patient presents with    Prior Auth     Diclofenac Sodium 1% gel:  APPROVED:  09/02/2021 through 09/01/2024     [facility] pharmacy was faxed approval notification at [phone] with confirmation received.   Tobias Walker LPN DRY EYES : Discussed with patient the importance of keeping the eye moist and the symptoms associated with dry eyes including blurry vision, tearing, burning, and prashanth sensation. Advised patient to minimize use of any fans blowing directly on the face. Advised patient to continue with artificial tears 2-3 times daily.

## 2021-10-27 ENCOUNTER — OFFICE VISIT (OUTPATIENT)
Dept: CARDIOLOGY CLINIC | Age: 46
End: 2021-10-27
Payer: COMMERCIAL

## 2021-10-27 DIAGNOSIS — Z95.810 AUTOMATIC IMPLANTABLE CARDIAC DEFIBRILLATOR IN SITU: Primary | ICD-10-CM

## 2021-10-27 PROCEDURE — 93295 DEV INTERROG REMOTE 1/2/MLT: CPT | Performed by: INTERNAL MEDICINE

## 2021-10-27 PROCEDURE — 93296 REM INTERROG EVL PM/IDS: CPT | Performed by: INTERNAL MEDICINE

## 2021-10-27 NOTE — LETTER
10/27/2021 4:11 PM    Mr. Felix Arana  [address]  [place] [address]            This letter confirms that we have received your scheduled remote check of your implanted     device on 10-27-21 . Our EP team will contact you via phone if there are significant abnormal    findings. Your next in-clinic device check is scheduled for 1-27-22 at 2:40 PM .                   If you have any questions, please call [address] [place] [address] at [phone].                Sincerely,    Simona Briceno MD [facility]

## 2021-12-02 ENCOUNTER — OFFICE VISIT (OUTPATIENT)
Dept: FAMILY MEDICINE CLINIC | Age: 46
End: 2021-12-02
Payer: COMMERCIAL

## 2021-12-02 VITALS
OXYGEN SATURATION: 98 % | WEIGHT: 191 LBS | TEMPERATURE: 97.5 F | RESPIRATION RATE: 16 BRPM | HEART RATE: 72 BPM | BODY MASS INDEX: 28.95 KG/M2 | SYSTOLIC BLOOD PRESSURE: 128 MMHG | HEIGHT: 68 IN | DIASTOLIC BLOOD PRESSURE: 81 MMHG

## 2021-12-02 DIAGNOSIS — Z11.59 NEED FOR HEPATITIS C SCREENING TEST: ICD-10-CM

## 2021-12-02 DIAGNOSIS — Z00.00 ROUTINE GENERAL MEDICAL EXAMINATION AT A HEALTH CARE FACILITY: Primary | ICD-10-CM

## 2021-12-02 DIAGNOSIS — Z23 ENCOUNTER FOR IMMUNIZATION: ICD-10-CM

## 2021-12-02 DIAGNOSIS — Z12.11 SCREEN FOR COLON CANCER: ICD-10-CM

## 2021-12-02 PROCEDURE — 90471 IMMUNIZATION ADMIN: CPT | Performed by: FAMILY MEDICINE

## 2021-12-02 PROCEDURE — 90686 IIV4 VACC NO PRSV 0.5 ML IM: CPT | Performed by: FAMILY MEDICINE

## 2021-12-02 PROCEDURE — 99396 PREV VISIT EST AGE 40-64: CPT | Performed by: FAMILY MEDICINE

## 2021-12-02 NOTE — PATIENT INSTRUCTIONS
Well Visit, Ages 25 to 48: Care Instructions  Overview     Well visits can help you stay healthy. Your doctor has checked your overall health and may have suggested ways to take good care of yourself. Your doctor also may have recommended tests. At home, you can help prevent illness with healthy eating, regular exercise, and other steps. Follow-up care is a key part of your treatment and safety. Be sure to make and go to all appointments, and call your doctor if you are having problems. It's also a good idea to know your test results and keep a list of the medicines you take. How can you care for yourself at home? · Get screening tests that you and your doctor decide on. Screening helps find diseases before any symptoms appear. · Eat healthy foods. Choose fruits, vegetables, whole grains, protein, and low-fat dairy foods. Limit fat, especially saturated fat. Reduce salt in your diet. · Limit alcohol. If you are a man, have no more than 2 drinks a day or 14 drinks a week. If you are a woman, have no more than 1 drink a day or 7 drinks a week. · Get at least 30 minutes of physical activity on most days of the week. Walking is a good choice. You also may want to do other activities, such as running, swimming, cycling, or playing tennis or team sports. Discuss any changes in your exercise program with your doctor. · Reach and stay at a healthy weight. This will lower your risk for many problems, such as obesity, diabetes, heart disease, and high blood pressure. · Do not smoke or allow others to smoke around you. If you need help quitting, talk to your doctor about stop-smoking programs and medicines. These can increase your chances of quitting for good. · Care for your mental health. It is easy to get weighed down by worry and stress. Learn strategies to manage stress, like deep breathing and mindfulness, and stay connected with your family and community.  If you find you often feel sad or hopeless, talk with your doctor. Treatment can help. · Talk to your doctor about whether you have any risk factors for sexually transmitted infections (STIs). You can help prevent STIs if you wait to have sex with a new partner (or partners) until you've each been tested for STIs. It also helps if you use condoms (male or female condoms) and if you limit your sex partners to one person who only has sex with you. Vaccines are available for some STIs, such as HPV. · Use birth control if it's important to you to prevent pregnancy. Talk with your doctor about the choices available and what might be best for you. · If you think you may have a problem with alcohol or drug use, talk to your doctor. This includes prescription medicines (such as amphetamines and opioids) and illegal drugs (such as cocaine and methamphetamine). Your doctor can help you figure out what type of treatment is best for you. · Protect your skin from too much sun. When you're outdoors from 10 a.m. to 4 p.m., stay in the shade or cover up with clothing and a hat with a wide brim. Wear sunglasses that block UV rays. Even when it's cloudy, put broad-spectrum sunscreen (SPF 30 or higher) on any exposed skin. · See a dentist one or two times a year for checkups and to have your teeth cleaned. · Wear a seat belt in the car. When should you call for help? Watch closely for changes in your health, and be sure to contact your doctor if you have any problems or symptoms that concern you. Where can you learn more? Go to http://www.Trackway.com/  Enter P072 in the search box to learn more about \"Well Visit, Ages 25 to 48: Care Instructions. \"  Current as of: February 11, 2021               Content Version: 13.0  © 0232-6967 Healthwise, Incorporated. Care instructions adapted under license by [employer] (which disclaims liability or warranty for this information).  If you have questions about a medical condition or this instruction, always ask your healthcare professional. [place] [address] any warranty or liability for your use of this information.

## 2021-12-02 NOTE — PROGRESS NOTES
Chief Complaint   Patient presents with    Cholesterol Problem     follow up         1. \"Have you been to the ER, urgent care clinic since your last visit? Hospitalized since your last visit? \" No    2. \"Have you seen or consulted any other health care providers outside of the [address] [place] [address] since your last visit? \" No     3. For patients over 45: Has the patient had a colonoscopy? No     If the patient is female:    4. For patients over 36: Has the patient had a mammogram? NA based on age or sex    11. For patients over 21: Has the patient had a pap smear?  NA based on age or sex       3 most recent PHQ Screens 12/2/2021   Little interest or pleasure in doing things Not at all   Feeling down, depressed, irritable, or hopeless Not at all   Total Score PHQ 2 0       Health Maintenance Due   Topic Date Due    Hepatitis C Screening  Never done    Colorectal Cancer Screening Combo  Never done    Flu Vaccine (1) Never done    A1C test (Diabetic or Prediabetic)  11/11/2021

## 2021-12-02 NOTE — PROGRESS NOTES
HISTORY OF PRESENT ILLNESS  Kwabena Busby is a 55 y.o. male. Patient was seen today for annual physical and age-appropriate health maintenance. HPI  Health Maintenance  Immunizations:    Influenza: will get this done today. Tetanus: up to date. Shingles: Not applicable. Pneumonia: n/a. Cancer screening:     Colon: reviewed guidelines, opted for Cologuard. Prostate: reviewed guidelines,      Patient Care Team:  Talon Thompson MD as PCP - General (Family Medicine)  Talon Thompson MD as PCP - [facility] Provider  Vinita Irby MD as Consulting Provider (Endocrinology)  Luanne Jeffries MD (Cardiology)       The following sections were reviewed & updated as appropriate: PMH, PSH, FH, and SH. Review of Systems   Constitutional: Negative for chills, fever and malaise/fatigue. HENT: Negative for congestion, ear pain, sore throat and tinnitus. Eyes: Negative for blurred vision, double vision, pain and discharge. Respiratory: Negative for cough, shortness of breath and wheezing. Cardiovascular: Negative for chest pain, palpitations and leg swelling. Gastrointestinal: Negative for abdominal pain, blood in stool, constipation, diarrhea, nausea and vomiting. Genitourinary: Negative for dysuria, frequency, hematuria and urgency. Musculoskeletal: Negative for back pain, joint pain and myalgias. Skin: Negative for rash. Neurological: Negative for dizziness, tremors, seizures and headaches. Endo/Heme/Allergies: Negative for polydipsia. Does not bruise/bleed easily. Psychiatric/Behavioral: Negative for depression and substance abuse. The patient is not nervous/anxious. Physical Exam  Vitals and nursing note reviewed. Constitutional:       Appearance: He is well-developed. He is not diaphoretic. HENT:      Head: Normocephalic and atraumatic. Right Ear: External ear normal.      Mouth/Throat:      Pharynx: No oropharyngeal exudate.    Eyes:      General: No scleral icterus. Conjunctiva/sclera: Conjunctivae normal.      Pupils: Pupils are equal, round, and reactive to light. Neck:      Thyroid: No thyromegaly. Vascular: No JVD. Cardiovascular:      Rate and Rhythm: Normal rate and regular rhythm. Heart sounds: Normal heart sounds. No murmur heard. Pulmonary:      Effort: Pulmonary effort is normal.      Breath sounds: Normal breath sounds. No wheezing. Abdominal:      General: Bowel sounds are normal. There is no distension. Palpations: Abdomen is soft. There is no mass. Musculoskeletal:         General: No tenderness. Normal range of motion. Cervical back: Normal range of motion and neck supple. Lymphadenopathy:      Cervical: No cervical adenopathy. Skin:     General: Skin is warm and dry. Findings: No rash. Neurological:      Mental Status: He is alert and oriented to person, place, and time. Cranial Nerves: No cranial nerve deficit. Deep Tendon Reflexes: Reflexes are normal and symmetric. Reflexes normal.         ASSESSMENT and PLAN  Diagnoses and all orders for this visit:    1. Routine general medical examination at a health care facility  -     CBC WITH AUTOMATED DIFF; Future  -     HEMOGLOBIN A1C WITH EAG; Future  -     LIPID PANEL; Future  -     METABOLIC PANEL, COMPREHENSIVE; Future  -     TSH 3RD GENERATION; Future  -     URINALYSIS W/ RFLX MICROSCOPIC; Future    2. Encounter for immunization  -     INFLUENZA VIRUS VAC QUAD,SPLIT,PRESV FREE SYRINGE IM    3. Need for hepatitis C screening test  -     HEPATITIS C AB; Future    4. Screen for colon cancer  -     COLOGUARD TEST (FECAL DNA COLORECTAL CANCER SCREENING); Future      Discussed lifestyle issues and health guidance given  Patient was given an after visit summary which includes diagnoses, vital signs, current medications, instructions and references & authorized prescriptions . Results of labs will be conveyed to patient, once available.   Pt verbalized instructions I provided and expressed understanding of discussion that was held today. Follow-up and Dispositions    · Return in about 6 months (around 6/2/2022) for follow up. Please note that this dictation was completed with [employer], the computer voice recognition software. Quite often unanticipated grammatical, syntax, homophones, and other interpretive errors are inadvertently transcribed by the computer software. Please disregard these errors. Please excuse any errors that have escaped final proofreading. Thank you.

## 2022-01-27 ENCOUNTER — OFFICE VISIT (OUTPATIENT)
Dept: CARDIOLOGY CLINIC | Age: 47
End: 2022-01-27

## 2022-01-27 ENCOUNTER — CLINICAL SUPPORT (OUTPATIENT)
Dept: CARDIOLOGY CLINIC | Age: 47
End: 2022-01-27
Payer: COMMERCIAL

## 2022-01-27 VITALS
BODY MASS INDEX: 28.34 KG/M2 | HEART RATE: 72 BPM | DIASTOLIC BLOOD PRESSURE: 82 MMHG | RESPIRATION RATE: 16 BRPM | HEIGHT: 68 IN | OXYGEN SATURATION: 98 % | WEIGHT: 187 LBS | SYSTOLIC BLOOD PRESSURE: 132 MMHG

## 2022-01-27 DIAGNOSIS — Z95.810 AUTOMATIC IMPLANTABLE CARDIAC DEFIBRILLATOR IN SITU: Primary | ICD-10-CM

## 2022-01-27 DIAGNOSIS — E04.1 THYROID NODULE: ICD-10-CM

## 2022-01-27 DIAGNOSIS — I47.1 PAT (PAROXYSMAL ATRIAL TACHYCARDIA) (HCC): ICD-10-CM

## 2022-01-27 DIAGNOSIS — I49.8 BRUGADA SYNDROME: ICD-10-CM

## 2022-01-27 PROCEDURE — 93000 ELECTROCARDIOGRAM COMPLETE: CPT | Performed by: INTERNAL MEDICINE

## 2022-01-27 PROCEDURE — 99214 OFFICE O/P EST MOD 30 MIN: CPT | Performed by: INTERNAL MEDICINE

## 2022-01-27 PROCEDURE — 93282 PRGRMG EVAL IMPLANTABLE DFB: CPT | Performed by: INTERNAL MEDICINE

## 2022-01-27 NOTE — PROGRESS NOTES
Cardiac Electrophysiology Office Note     Subjective:      Agatha Ryan is a 55 y.o.man who presents for annual follow up, is s/p Biotronik single lead ICD (DOI 02/02/2015). He has hx of Brugada syndrome PAT and syncope    Device check today shows proper lead & generator function. States he is doing well. He denies chest pain, PND, orthopnea, or edema. Previous:    Echo (02/02/2015): LVEF 55-60%, no RWMA. S/p Biotronik single lead ICD 02/02/2015 for Brugada syndrome with syncope. Asymptomatic with PAT. Problem List  Date Reviewed: 1/29/2022          Codes Class Noted    PAT (paroxysmal atrial tachycardia) ([facility] [address] ICD-10-CM: I47.1  ICD-9-CM: 427.0  11/9/2020        Over weight ICD-10-CM: E66.3  ICD-9-CM: 278.02  6/19/2019        Neurocardiogenic syncope ICD-10-CM: R55  ICD-9-CM: 780.2  1/18/2019    Overview Signed 1/18/2019  1:03 PM by Ramo Cali MD     1/18/2019             Elevated CPK ICD-10-CM: R74.8  ICD-9-CM: 790.5  6/19/2017        Thyroid nodule ICD-10-CM: E04.1  ICD-9-CM: 241.0  10/21/2016        Prediabetes ICD-10-CM: R73.03  ICD-9-CM: 790.29  10/21/2016        S/P ICD (internal cardiac defibrillator) procedure ICD-10-CM: Z95.810  ICD-9-CM: V45.02  2/2/2015    Overview Signed 2/2/2015  7:26 PM by Ramo Cali MD     Single lead Biotronik ICD DX lead DFT < 25 J 2/2/2015             Brugada syndrome ICD-10-CM: I49.8  ICD-9-CM: 746.89  2/1/2015        Hyperlipidemia LDL goal <100 ICD-10-CM: E78.5  ICD-9-CM: 272.4  3/11/2011              Current Outpatient Medications   Medication Sig Dispense Refill    metFORMIN ER (GLUCOPHAGE XR) 500 mg tablet TAKE 1 TAB BY MOUTH DAILY (WITH DINNER). 90 Tablet 0    simvastatin (ZOCOR) 10 mg tablet TAKE 1 TABLET BY MOUTH EVERY DAY AT NIGHT 90 Tablet 0    calcium-vitamin D (OYSTER SHELL) 500 mg(1,250mg) -200 unit per tablet Take 1 Tab by mouth daily.       fexofenadine (ALLEGRA) 180 mg tablet Take 180 mg by mouth daily as needed for Allergies.  aspirin delayed-release 81 mg tablet Take 81 mg by mouth daily.  fish oil-omega-3 fatty acids 340-1,000 mg capsule Take 2 Caps by mouth daily. No Known Allergies  Past Medical History:   Diagnosis Date    Brugada syndrome     High cholesterol     Hypercholesteremia     Seizures (HCC)      Past Surgical History:   Procedure Laterality Date    INS PPM/ICD LED SING ONLY  2/2/2015          Family History   Problem Relation Age of Onset    Diabetes Father     Elevated Lipids Father     Hypertension Father     Heart Disease Father     Hypertension Mother     Diabetes Mother     Elevated Lipids Brother     Hypertension Brother      Social History     Tobacco Use    Smoking status: Never Smoker    Smokeless tobacco: Never Used   Substance Use Topics    Alcohol use: No     Alcohol/week: 0.0 standard drinks        Review of Systems: All other review of systems otherwise normal.  Constitutional: Negative for fever, chills, weight loss, malaise/fatigue. HEENT: Negative for nosebleeds, vision changes. Respiratory: Negative for cough, hemoptysis, sputum production, and wheezing. Cardiovascular: Negative for chest pain, palpitations, orthopnea, claudication, leg swelling, syncope, and PND. Gastrointestinal: Negative for nausea, vomiting, diarrhea, constipation, blood in stool and melena. Genitourinary: Negative for dysuria, and hematuria. Musculoskeletal: Negative for myalgias, arthralgia. Skin: Negative for rash. Heme: Does not bleed or bruise easily. Neurological: Negative for speech change and focal weakness     Objective:     Visit Vitals  /82   Pulse 72   Resp 16   Ht 5' 8\" (1.727 m)   Wt 187 lb (84.8 kg)   SpO2 98%   BMI 28.43 kg/m²      Physical Exam:   Constitutional: Well-developed and well-nourished. No distress. Head: Normocephalic and atraumatic.    Eyes: Pupils are equal, round  Neck: Soft goiter palpable   Cardiovascular: Normal rate, regular rhythm and normal heart sounds. Exam reveals no gallop and no friction rub. No murmur heard. Pulmonary/Chest: Effort normal and breath sounds normal. No wheezes. Abdominal: Soft, no tenderness. Musculoskeletal: Moves extremities independently. Normal gait. Vasc/lymphatic: No edema. Neurological: Alert, oriented. Skin: Skin is warm and dry. Left chest ICD site well healed. Psychiatric: Normal mood and affect. Behavior is normal. Judgment and thought content normal.          Assessment/Plan:       ICD-10-CM ICD-9-CM    1. Automatic implantable cardiac defibrillator in situ  Z95.810 V45.02    2. Brugada syndrome  I49.8 746.89 AMB POC EKG ROUTINE W/ 12 LEADS, INTER & REP      REFERRAL TO ENDOCRINOLOGY   3. Thyroid nodule  E04.1 241.0 REFERRAL TO ENDOCRINOLOGY   4. PAT (paroxysmal atrial tachycardia) (HCC)  I47.1 427.0      ECG Normal Sinus  Rhythm       Biotronik single lead ICD (DOI 02/02/2015) check today shows proper lead & generator function. In 1 year he had > 100 SVT. These were short ST or PAT. He is asymptomatic  TSH normal   5 years of battery left  No recurrent syncope last one year    Bp was high at first but recheck was normal.  He had dizziness with low bp so I am careful with any med    Thyroid nodules on ultrasound. Not lab abnormality but to settle this issue he agrees to be refered to endocrinologist    History of Brugada syndrome, but no recent VT. If the patient has significant symptomatic sinus bradycardia, I would recommend upgrade of the single-chamber ICD to dual-chamber ICD. LVEF WNL in 2015 via echo. There was no new cardiac symptoms    Asymptomatic with PAT, no medication indicated for control at this time. Check Biotronik ICD remotely every 3 months. EP clinic follow up in 1 year.   DM2 and lipid med managed by PCP    Future Appointments   Date Time Provider Janel Foote   5/4/2022  4:45 PM REMOTE1, ANA RIVERO [facility]   8/10/2022 11:45 AM REMOTE1, ANA ALARCON [facility]   11/28/2022 10:15 AM REMOTE1, ANA RIVERO AMB   3/2/2023  3:00 PM PACEMAKER3, ANA RIVERO AMB   3/2/2023  3:20 PM MD DORIAN Marx BS AMB           Thank you for involving me in this patient's care and please call with further concerns or questions. Evie Staton M.D.   Electrophysiology/Cardiology  [facility] [place]  [facility] [address], [facility] [address] [place] [address]  [phone]

## 2022-02-01 DIAGNOSIS — R73.03 PREDIABETES: ICD-10-CM

## 2022-02-03 RX ORDER — METFORMIN HYDROCHLORIDE 500 MG/1
500 TABLET, EXTENDED RELEASE ORAL
Qty: 30 TABLET | Refills: 2 | Status: SHIPPED | OUTPATIENT
Start: 2022-02-03 | End: 2022-05-05

## 2022-02-03 RX ORDER — SIMVASTATIN 10 MG/1
TABLET, FILM COATED ORAL
Qty: 30 TABLET | Refills: 2 | Status: SHIPPED | OUTPATIENT
Start: 2022-02-03 | End: 2022-05-05

## 2022-03-07 ENCOUNTER — VIRTUAL VISIT (OUTPATIENT)
Dept: FAMILY MEDICINE CLINIC | Age: 47
End: 2022-03-07
Payer: COMMERCIAL

## 2022-03-07 DIAGNOSIS — K29.00 OTHER ACUTE GASTRITIS WITHOUT HEMORRHAGE: Primary | ICD-10-CM

## 2022-03-07 DIAGNOSIS — R10.10 RECURRENT UPPER ABDOMINAL PAIN: ICD-10-CM

## 2022-03-07 DIAGNOSIS — A04.8 H. PYLORI INFECTION: ICD-10-CM

## 2022-03-07 DIAGNOSIS — K21.9 GASTROESOPHAGEAL REFLUX DISEASE WITHOUT ESOPHAGITIS: ICD-10-CM

## 2022-03-07 PROCEDURE — 99213 OFFICE O/P EST LOW 20 MIN: CPT | Performed by: FAMILY MEDICINE

## 2022-03-07 RX ORDER — FAMOTIDINE 40 MG/1
40 TABLET, FILM COATED ORAL 2 TIMES DAILY
Qty: 30 TABLET | Refills: 0 | Status: SHIPPED | OUTPATIENT
Start: 2022-03-07 | End: 2022-09-07 | Stop reason: ALTCHOICE

## 2022-03-07 NOTE — PATIENT INSTRUCTIONS
H. Pylori Bacterial Infection: Care Instructions  Your Care Instructions     Your test shows the presence of Helicobacter pylori ( H. pylori), a kind of bacterium that lives in the lining of the stomach. Many people have H. pylori in their stomachs and do not develop problems. But sometimes H. pylori causes an upset stomach or a sore (ulcer) in the stomach lining. Most stomach ulcers are caused by H. pylori. Symptoms of an ulcer include gnawing or burning pain in the belly that can last minutes or hours. Eating food or taking antacids helps relieve the pain, but the symptoms may come back after a while. Antibiotic medicine can cure an H. pylori infection. Follow-up care is a key part of your treatment and safety. Be sure to make and go to all appointments, and call your doctor if you are having problems. It's also a good idea to know your test results and keep a list of the medicines you take. How can you care for yourself at home? · Take your antibiotics as directed. Do not stop taking them just because you feel better. You need to take the full course of antibiotics. · If your doctor prescribes other medicine, take it exactly as prescribed. Call your doctor if you think you are having a problem with your medicine. You will get more details on the specific medicine your doctor prescribes. · Eat a healthy, balanced diet. ? Eat smaller meals, and eat more often. Be sure to eat at least three meals a day. ? Avoid heavily spiced or greasy foods. ? Do not drink beverages that have caffeine if they bother your stomach. These include coffee, tea, and soda. · Do not smoke. Smoking slows the healing of your ulcer and can make an ulcer come back. If you need help quitting, talk to your doctor about stop-smoking programs and medicines. These can increase your chances of quitting for good. · Limit how much alcohol you drink. Alcohol can slow healing of an ulcer and can make your symptoms worse.   · Wash your hands after going to the bathroom. · Avoid aspirin, ibuprofen, or other anti-inflammatory medicines, because they can irritate the stomach. If you need pain medicine, try acetaminophen (Tylenol). When should you call for help? Call 911 anytime you think you may need emergency care. For example, call if:    · You vomit blood or what looks like coffee grounds.     · Your stools are maroon or very bloody. Call your doctor now or seek immediate medical care if:    · You have new or worse belly pain.     · You are vomiting.     · Your stools are black and look like tar, or they have streaks of blood. Watch closely for changes in your health, and be sure to contact your doctor if:    · You do not get better as expected. Where can you learn more? Go to http://www.gray.com/  Enter [id number] in the search box to learn more about \"H. Pylori Bacterial Infection: Care Instructions. \"  Current as of: February 10, 2021               Content Version: 13.0  © 2006-2021 Healthwise, Incorporated. Care instructions adapted under license by [employer] (which disclaims liability or warranty for this information). If you have questions about a medical condition or this instruction, always ask your healthcare professional. [address] any warranty or liability for your use of this information.

## 2022-03-07 NOTE — PROGRESS NOTES
Dinora Mccall is a 55 y.o. male who was seen by synchronous (real-time) audio-video technology on 3/7/2022 for Abdominal Pain and Heartburn        Assessment & Plan:   Diagnoses and all orders for this visit:    1. Other acute gastritis without hemorrhage  -     H PYLORI AG, STOOL; Future  -     famotidine (PEPCID) 40 mg tablet; Take 1 Tablet by mouth two (2) times a day. 2. Gastroesophageal reflux disease without esophagitis  -     H PYLORI AG, STOOL; Future  -     famotidine (PEPCID) 40 mg tablet; Take 1 Tablet by mouth two (2) times a day. 3. Recurrent upper abdominal pain  -     H PYLORI AG, STOOL; Future      We discussed possibility of H. pylori infection versus medication side effect versus food allergy. Will check for H. pylori stool antigen and started on H2 blocker. If symptoms persist, will refer to GI for EGD. Subjective:   Abdominal Pain  Patient complains of abdominal pain. The pain is described as burning and cramping, and is 6/10 in intensity. Pain is located in the epigastric without radiation. Onset was a few weeks ago. Symptoms have been unchanged since. Aggravating factors: eating, spicy foods, fatty foods and Large meals  Alleviating factors: Over-the-counter Pepto-Bismol. Associated symptoms: Flushing of face. The patient denies chills, constipation, diarrhea, dysuria, fever, hematuria, melena, nausea, urinary frequency, urinary urgency and vomiting. Prior to Admission medications    Medication Sig Start Date End Date Taking? Authorizing Provider   famotidine (PEPCID) 40 mg tablet Take 1 Tablet by mouth two (2) times a day. 3/7/22  Yes Krista Carballo MD   simvastatin (ZOCOR) 10 mg tablet TAKE 1 TABLET BY MOUTH EVERY DAY AT NIGHT 2/3/22  Yes Krista Carballo MD   metFORMIN ER (GLUCOPHAGE XR) 500 mg tablet TAKE 1 TAB BY MOUTH DAILY (WITH DINNER).  2/3/22  Yes Krista Carballo MD   calcium-vitamin D (OYSTER SHELL) 500 mg(1,250mg) -200 unit per tablet Take 1 Tab by mouth daily. Yes Provider, Historical   fexofenadine (ALLEGRA) 180 mg tablet Take 180 mg by mouth daily as needed for Allergies. Yes Provider, Historical   aspirin delayed-release 81 mg tablet Take 81 mg by mouth daily. Yes Provider, Historical   fish oil-omega-3 fatty acids 340-1,000 mg capsule Take 2 Caps by mouth daily. Yes Provider, Historical     Patient Active Problem List    Diagnosis Date Noted    PAT [facility]paroxysmal atrial tachycardia) ([facility] [address] 11/09/2020    Over weight 06/19/2019    Neurocardiogenic syncope 01/18/2019    Elevated CPK 06/19/2017    Thyroid nodule 10/21/2016    Prediabetes 10/21/2016    S/P ICD (internal cardiac defibrillator) procedure 02/02/2015    Brugada syndrome 02/01/2015    Hyperlipidemia LDL goal <100 03/11/2011     Current Outpatient Medications   Medication Sig Dispense Refill    famotidine (PEPCID) 40 mg tablet Take 1 Tablet by mouth two (2) times a day. 30 Tablet 0    simvastatin (ZOCOR) 10 mg tablet TAKE 1 TABLET BY MOUTH EVERY DAY AT NIGHT 30 Tablet 2    metFORMIN ER (GLUCOPHAGE XR) 500 mg tablet TAKE 1 TAB BY MOUTH DAILY (WITH DINNER). 30 Tablet 2    calcium-vitamin D (OYSTER SHELL) 500 mg(1,250mg) -200 unit per tablet Take 1 Tab by mouth daily.  fexofenadine (ALLEGRA) 180 mg tablet Take 180 mg by mouth daily as needed for Allergies.  aspirin delayed-release 81 mg tablet Take 81 mg by mouth daily.  fish oil-omega-3 fatty acids 340-1,000 mg capsule Take 2 Caps by mouth daily.        No Known Allergies  Past Medical History:   Diagnosis Date    Brugada syndrome     High cholesterol     Hypercholesteremia     Seizures (HCC)      Past Surgical History:   Procedure Laterality Date    INS PPM/ICD LED SING ONLY  2/2/2015          Family History   Problem Relation Age of Onset    Diabetes Father     Elevated Lipids Father     Hypertension Father     Heart Disease Father     Hypertension Mother     Diabetes Mother     Elevated Lipids Brother    Chloe Northern Hypertension Brother      Social History     Tobacco Use    Smoking status: Never Smoker    Smokeless tobacco: Never Used   Substance Use Topics    Alcohol use: No     Alcohol/week: 0.0 standard drinks       Review of Systems   Constitutional: Negative for chills, fever and malaise/fatigue. HENT: Negative for congestion, ear pain, sore throat and tinnitus. Eyes: Negative for blurred vision, double vision, pain and discharge. Respiratory: Negative for cough, shortness of breath and wheezing. Cardiovascular: Negative for chest pain, palpitations and leg swelling. Gastrointestinal: Positive for abdominal pain and heartburn. Negative for blood in stool, constipation, diarrhea, nausea and vomiting. Bloating   Genitourinary: Negative for dysuria, frequency, hematuria and urgency. Musculoskeletal: Negative for back pain, joint pain and myalgias. Skin: Negative for rash. Neurological: Negative for dizziness, tremors, seizures and headaches. Endo/Heme/Allergies: Negative for polydipsia. Does not bruise/bleed easily. Flushing of face   Psychiatric/Behavioral: Negative for depression and substance abuse. The patient is not nervous/anxious.         Objective:     Patient-Reported Vitals 3/7/2022   Patient-Reported Weight 184   Patient-Reported Height 510   Patient-Reported Pulse -   Patient-Reported Temperature 98   Patient-Reported SpO2 -   Patient-Reported Systolic  581   Patient-Reported Diastolic 89        [INSTRUCTIONS:  \"[x]\" Indicates a positive item  \"[]\" Indicates a negative item  -- DELETE ALL ITEMS NOT EXAMINED]    Constitutional: [x] Appears well-developed and well-nourished [x] No apparent distress      [] Abnormal -     Mental status: [x] Alert and awake  [x] Oriented to person/place/time [x] Able to follow commands    [] Abnormal -     Eyes:   EOM    [x]  Normal    [] Abnormal -   Sclera  [x]  Normal    [] Abnormal -          Discharge [x]  None visible   [] Abnormal - HENT: [x] Normocephalic, atraumatic  [] Abnormal -   [x] Mouth/Throat: Mucous membranes are moist    External Ears [x] Normal  [] Abnormal -    Neck: [x] No visualized mass [] Abnormal -     Pulmonary/Chest: [x] Respiratory effort normal   [x] No visualized signs of difficulty breathing or respiratory distress        [] Abnormal -      Musculoskeletal:   [x] Normal gait with no signs of ataxia         [x] Normal range of motion of neck        [] Abnormal -     Neurological:        [x] No Facial Asymmetry (Cranial nerve 7 motor function) (limited exam due to video visit)          [x] No gaze palsy        [] Abnormal -          Skin:        [x] No significant exanthematous lesions or discoloration noted on facial skin         [] Abnormal -            Psychiatric:       [x] Normal Affect [] Abnormal -        [x] No Hallucinations    Other pertinent observable physical exam findings:-        We discussed the expected course, resolution and complications of the diagnosis(es) in detail. Medication risks, benefits, costs, interactions, and alternatives were discussed as indicated. I advised him to contact the office if his condition worsens, changes or fails to improve as anticipated. He expressed understanding with the diagnosis(es) and plan. Lovely Debo, was evaluated through a synchronous (real-time) audio-video encounter. The patient (or guardian if applicable) is aware that this is a billable service, which includes applicable co-pays. Verbal consent to proceed has been obtained. The visit was conducted pursuant to the emergency declaration under the [address] [place] [address] and the [employer] and [employer] General Act. Patient identification was verified, and a caregiver was present when appropriate. The patient was located at home in a state where the provider was licensed to provide care.     This service was provided through telehealth, between patient Cristela Briceno participating from home and Dorys Cornejo MD from [facility]     I discussed with the patient the nature of our telemedicine visits, that:      I would evaluate the patient and recommend diagnostics and treatments based on my assessment   Our sessions are not being recorded and that personal health information is protected   Our team would provide follow up care in person if/when the patient needs it    Timur Lin MD

## 2022-03-14 RX ORDER — OMEPRAZOLE 40 MG/1
40 CAPSULE, DELAYED RELEASE ORAL
Qty: 28 CAPSULE | Refills: 0 | Status: SHIPPED | OUTPATIENT
Start: 2022-03-14 | End: 2022-09-07 | Stop reason: ALTCHOICE

## 2022-03-14 RX ORDER — AMOXICILLIN 500 MG/1
500 CAPSULE ORAL 2 TIMES DAILY
Qty: 28 CAPSULE | Refills: 0 | Status: SHIPPED | OUTPATIENT
Start: 2022-03-14 | End: 2022-03-28

## 2022-03-14 RX ORDER — CLARITHROMYCIN 500 MG/1
500 TABLET, FILM COATED ORAL 2 TIMES DAILY
Qty: 28 TABLET | Refills: 0 | Status: SHIPPED | OUTPATIENT
Start: 2022-03-14 | End: 2022-03-28

## 2022-03-18 PROBLEM — I47.19 PAT (PAROXYSMAL ATRIAL TACHYCARDIA): Status: ACTIVE | Noted: 2020-11-09

## 2022-03-18 PROBLEM — I47.1 PAT (PAROXYSMAL ATRIAL TACHYCARDIA) (HCC): Status: ACTIVE | Noted: 2020-11-09

## 2022-03-19 PROBLEM — R55 NEUROCARDIOGENIC SYNCOPE: Status: ACTIVE | Noted: 2019-01-18

## 2022-03-19 PROBLEM — E66.3 OVER WEIGHT: Status: ACTIVE | Noted: 2019-06-19

## 2022-03-19 PROBLEM — R74.8 ELEVATED CPK: Status: ACTIVE | Noted: 2017-06-19

## 2022-05-04 ENCOUNTER — OFFICE VISIT (OUTPATIENT)
Dept: CARDIOLOGY CLINIC | Age: 47
End: 2022-05-04
Payer: COMMERCIAL

## 2022-05-04 DIAGNOSIS — Z95.810 AUTOMATIC IMPLANTABLE CARDIAC DEFIBRILLATOR IN SITU: Primary | ICD-10-CM

## 2022-05-04 NOTE — LETTER
5/5/2022 8:41 AM    Mr. Carlos Jiménez  [address] [place] [address]            This letter confirms that we have received your scheduled remote check of your implanted     device on 5-5-22  . Our EP team will contact you via phone if there are significant abnormal    findings. Your next remote check from home is scheduled for 8-10-22  . If you have any questions, please call [address] [place] [address] Drive at [phone].                Sincerely,    Luis Byrne MD [facility]

## 2022-05-05 DIAGNOSIS — R73.03 PREDIABETES: ICD-10-CM

## 2022-05-05 PROCEDURE — 93296 REM INTERROG EVL PM/IDS: CPT | Performed by: INTERNAL MEDICINE

## 2022-05-05 PROCEDURE — 93295 DEV INTERROG REMOTE 1/2/MLT: CPT | Performed by: INTERNAL MEDICINE

## 2022-05-05 RX ORDER — METFORMIN HYDROCHLORIDE 500 MG/1
500 TABLET, EXTENDED RELEASE ORAL
Qty: 90 TABLET | Refills: 1 | Status: SHIPPED | OUTPATIENT
Start: 2022-05-05

## 2022-05-05 RX ORDER — SIMVASTATIN 10 MG/1
TABLET, FILM COATED ORAL
Qty: 90 TABLET | Refills: 1 | Status: SHIPPED | OUTPATIENT
Start: 2022-05-05

## 2022-06-21 ENCOUNTER — VIRTUAL VISIT (OUTPATIENT)
Dept: FAMILY MEDICINE CLINIC | Age: 47
End: 2022-06-21
Payer: COMMERCIAL

## 2022-06-21 DIAGNOSIS — U07.1 COVID-19: Primary | ICD-10-CM

## 2022-06-21 PROCEDURE — 99213 OFFICE O/P EST LOW 20 MIN: CPT | Performed by: FAMILY MEDICINE

## 2022-06-21 NOTE — PATIENT INSTRUCTIONS
10 Things to Do When You Have COVID-19      Talk to your doctor about treatment. They might have you take medicine to help prevent serious illness. Stay home. Don't go to school, work, or public areas. And don't use public transportation, ride-shares, or taxis unless you have no choice. Leave your home only if you need to get medical care. But call the doctor's office first so they know you're coming. And wear a mask. Ask before leaving isolation. Follow your doctor's advice about when it is safe for you to leave isolation. Wear a mask when you are around other people. It can help stop the spread of the virus. Limit contact with people in your home. If possible, stay in a separate bedroom and use a separate bathroom. Cover your mouth and nose with a tissue when you cough or sneeze. Then throw the tissue in the trash right away. Wash your hands often, especially after you cough or sneeze. Use soap and water, and scrub for at least 20 seconds. If soap and water aren't available, use an alcohol-based hand . Don't share personal household items. These include bedding, towels, cups and glasses, and eating utensils. Clean and disinfect your home every day. Use household  or disinfectant wipes or sprays. If needed, take acetaminophen (Tylenol) or ibuprofen (Advil, Motrin) to relieve fever and body aches. Read and follow all instructions on the label. Current as of: March 26, 2021               Content Version: 13.2  © 2006-2022 Healthwise, Incorporated. Care instructions adapted under license by [employer] (which disclaims liability or warranty for this information). If you have questions about a medical condition or this instruction, always ask your healthcare professional. [place] [address] any warranty or liability for your use of this information.

## 2022-06-21 NOTE — PROGRESS NOTES
Montana Castillo is a 55 y.o. male who was seen by synchronous (real-time) audio-video technology on 6/21/2022 for Positive For Covid-19        Assessment & Plan:   Diagnoses and all orders for this visit:    1. COVID-19       We discussed that    Mild and stable symptoms are managed at home     Treatment of coronavirus does not require an antibiotic   Remain isolated for 5 days since symptoms first appeared AND at least 3 days have passed after recovery    o Recovery is defined as resolution of fever without the use of fever-reducing medications with progressive improvement or resolution of other symptoms     Wash hands often with soap and water for at least 20 seconds or alternatively use hand  with at least 60% alcohol content   Cover coughs and sneezes   Wear a mask when around others if possible   Clean all high-touch surfaces every day, such as doorknobs and cellphones   Continually monitor symptoms. Contact your medical provider if symptoms are worsening, such as difficulty breathing   For more information visit the CDC website: DotProtection.gl         Subjective:     Montana Castillo is currently Confirmed for COVID-19. Presenting symptoms: nasal congestion and headache. He denies  fever, chills, cough, sore throat, shortness of breath, chest pain, diarrhea, nausea and vomiting, abdominal pain, loss of smell, loss of taste and malaise  Max temperature in last 24 hours: 99  Symptoms began on Friday, June 17. Exposure source: work contact, he was in conference    Prior to Admission medications    Medication Sig Start Date End Date Taking? Authorizing Provider   simvastatin (ZOCOR) 10 mg tablet TAKE 1 TABLET BY MOUTH EVERY DAY AT NIGHT 5/5/22  Yes Cesar Smith MD   metFORMIN ER (GLUCOPHAGE XR) 500 mg tablet TAKE 1 TAB BY MOUTH DAILY (WITH DINNER).  5/5/22  Yes Cesar Smith MD   omeprazole (PRILOSEC) 40 mg capsule Take 1 Capsule by mouth Before breakfast and dinner. 3/14/22  Yes Zach Sanchez MD   famotidine (PEPCID) 40 mg tablet Take 1 Tablet by mouth two (2) times a day. 3/7/22  Yes Zach Sanchez MD   calcium-vitamin D (OYSTER SHELL) 500 mg(1,250mg) -200 unit per tablet Take 1 Tab by mouth daily. Yes Provider, Historical   fexofenadine (ALLEGRA) 180 mg tablet Take 180 mg by mouth daily as needed for Allergies. Yes Provider, Historical   aspirin delayed-release 81 mg tablet Take 81 mg by mouth daily. Yes Provider, Historical   fish oil-omega-3 fatty acids 340-1,000 mg capsule Take 2 Caps by mouth daily. Yes Provider, Historical     Patient Active Problem List    Diagnosis Date Noted    [facility]    Over weight 06/19/2019    Neurocardiogenic syncope 01/18/2019    Elevated CPK 06/19/2017    Thyroid nodule 10/21/2016    Prediabetes 10/21/2016    S/P ICD (internal cardiac defibrillator) procedure 02/02/2015    Brugada syndrome 02/01/2015    Hyperlipidemia LDL goal <100 03/11/2011     Current Outpatient Medications   Medication Sig Dispense Refill    simvastatin (ZOCOR) 10 mg tablet TAKE 1 TABLET BY MOUTH EVERY DAY AT NIGHT 90 Tablet 1    metFORMIN ER (GLUCOPHAGE XR) 500 mg tablet TAKE 1 TAB BY MOUTH DAILY (WITH DINNER). 90 Tablet 1    omeprazole (PRILOSEC) 40 mg capsule Take 1 Capsule by mouth Before breakfast and dinner. 28 Capsule 0    famotidine (PEPCID) 40 mg tablet Take 1 Tablet by mouth two (2) times a day. 30 Tablet 0    calcium-vitamin D (OYSTER SHELL) 500 mg(1,250mg) -200 unit per tablet Take 1 Tab by mouth daily.  fexofenadine (ALLEGRA) 180 mg tablet Take 180 mg by mouth daily as needed for Allergies.  aspirin delayed-release 81 mg tablet Take 81 mg by mouth daily.  fish oil-omega-3 fatty acids 340-1,000 mg capsule Take 2 Caps by mouth daily.        No Known Allergies  Past Medical History:   Diagnosis Date    Brugada syndrome     High cholesterol     Hypercholesteremia     Seizures (HCC)      Past Surgical History:   Procedure Laterality Date    INS PPM/ICD LED SING ONLY  2/2/2015          Family History   Problem Relation Age of Onset    Diabetes Father     Elevated Lipids Father     Hypertension Father     Heart Disease Father     Hypertension Mother     Diabetes Mother     Elevated Lipids Brother     Hypertension Brother      Social History     Tobacco Use    Smoking status: Never Smoker    Smokeless tobacco: Never Used   Substance Use Topics    Alcohol use: No     Alcohol/week: 0.0 standard drinks       Review of Systems   Constitutional: Negative for chills, fever and malaise/fatigue. HENT: Positive for congestion. Negative for ear pain, sore throat and tinnitus. Eyes: Negative for blurred vision, double vision, pain and discharge. Respiratory: Negative for cough, shortness of breath and wheezing. Cardiovascular: Negative for chest pain, palpitations and leg swelling. Gastrointestinal: Negative for abdominal pain, blood in stool, constipation, diarrhea, nausea and vomiting. Genitourinary: Negative for dysuria, frequency, hematuria and urgency. Musculoskeletal: Negative for back pain, joint pain and myalgias. Skin: Negative for rash. Neurological: Positive for headaches. Negative for dizziness, tremors and seizures. Endo/Heme/Allergies: Negative for polydipsia. Does not bruise/bleed easily. Psychiatric/Behavioral: Negative for depression and substance abuse. The patient is not nervous/anxious.         Objective:     Patient-Reported Vitals 6/21/2022   Patient-Reported Weight 185   Patient-Reported Height -   Patient-Reported Pulse 76   Patient-Reported Temperature 98   Patient-Reported SpO2 99   Patient-Reported Systolic  833   Patient-Reported Diastolic 94        [INSTRUCTIONS:  \"[x]\" Indicates a positive item  \"[]\" Indicates a negative item  -- DELETE ALL ITEMS NOT EXAMINED]    Constitutional: [x] Appears well-developed and well-nourished [x] No apparent distress      [] Abnormal -     Mental status: [x] Alert and awake  [x] Oriented to person/place/time [x] Able to follow commands    [] Abnormal -     Eyes:   EOM    [x]  Normal    [] Abnormal -   Sclera  [x]  Normal    [] Abnormal -          Discharge [x]  None visible   [] Abnormal -     HENT: [x] Normocephalic, atraumatic  [] Abnormal -   [x] Mouth/Throat: Mucous membranes are moist    External Ears [x] Normal  [] Abnormal -    Neck: [x] No visualized mass [] Abnormal -     Pulmonary/Chest: [x] Respiratory effort normal   [x] No visualized signs of difficulty breathing or respiratory distress        [] Abnormal -      Musculoskeletal:   [x] Normal gait with no signs of ataxia         [x] Normal range of motion of neck        [] Abnormal -     Neurological:        [x] No Facial Asymmetry (Cranial nerve 7 motor function) (limited exam due to video visit)          [x] No gaze palsy        [] Abnormal -          Skin:        [x] No significant exanthematous lesions or discoloration noted on facial skin         [] Abnormal -            Psychiatric:       [x] Normal Affect [] Abnormal -        [x] No Hallucinations    Other pertinent observable physical exam findings:-        We discussed the expected course, resolution and complications of the diagnosis(es) in detail. Medication risks, benefits, costs, interactions, and alternatives were discussed as indicated. I advised him to contact the office if his condition worsens, changes or fails to improve as anticipated. He expressed understanding with the diagnosis(es) and plan. Pham Phillips, was evaluated through a synchronous (real-time) audio-video encounter. The patient (or guardian if applicable) is aware that this is a billable service, which includes applicable co-pays. This Virtual Visit was conducted with patient's (and/or legal guardian's) consent.  The visit was conducted pursuant to the emergency declaration under the [facility], [address] [place] [address] authority and the [employer] and [employer] General Act. Patient identification was verified, and a caregiver was present when appropriate. The patient was located at: Home: [address] [facility] [address]  The provider was located at:  Facility ([facility] Department): [phone]      This service was provided through telehealth, between patient Sandra Sandhu participating from home and Haim Barcenas MD from [facility]     I discussed with the patient the nature of our telemedicine visits, that:      I would evaluate the patient and recommend diagnostics and treatments based on my assessment   Our sessions are not being recorded and that personal health information is protected   Our team would provide follow up care in person if/when the patient needs it    Moses Garcia MD

## 2022-06-25 ENCOUNTER — PATIENT MESSAGE (OUTPATIENT)
Dept: FAMILY MEDICINE CLINIC | Age: 47
End: 2022-06-25

## 2022-06-27 RX ORDER — TRIAMCINOLONE ACETONIDE 5 MG/G
OINTMENT TOPICAL 2 TIMES DAILY
Qty: 30 G | Refills: 0 | Status: SHIPPED | OUTPATIENT
Start: 2022-06-27 | End: 2022-09-07 | Stop reason: ALTCHOICE

## 2022-06-27 NOTE — TELEPHONE ENCOUNTER
From: Rubio Hernandez  To: Eladia Lo MD  Sent: 6/25/2022 6:17 PM EDT  Subject: Small bubbles in hand     Dr Villa Player,   I am seeing small insects like bites in my hand and it has been theres for 3-4 days now. It has been itching a lot and I tried to use Cortisone cream but that does not see to help. I am not sure if this is related to the recent Covid infection or something else. I wanted to check with you on this. I do not know if it needs any other medications but the itching is severe at times .  So please suggest what can be done

## 2022-08-29 ENCOUNTER — OFFICE VISIT (OUTPATIENT)
Dept: CARDIOLOGY CLINIC | Age: 47
End: 2022-08-29
Payer: COMMERCIAL

## 2022-08-29 DIAGNOSIS — Z95.810 AUTOMATIC IMPLANTABLE CARDIAC DEFIBRILLATOR IN SITU: Primary | ICD-10-CM

## 2022-08-29 PROCEDURE — 93295 DEV INTERROG REMOTE 1/2/MLT: CPT | Performed by: INTERNAL MEDICINE

## 2022-08-29 PROCEDURE — 93296 REM INTERROG EVL PM/IDS: CPT | Performed by: INTERNAL MEDICINE

## 2022-08-29 NOTE — LETTER
9/2/2022 8:43 AM    Mr. Micheline Meade  [address] [facility] [address] [address]      Dear Mr. Micheline Meade,    We have received your recent remote monitor check of your implanted device on 8/29/22. Your remaining estimated battery life is 36% and your device is working normally & appropriately. You have occasional fast heart rates with the longest one up to 3 minutes. If you have symptoms of palpitations or fluttering in your chest or dizziness, please let us know. Your next remote monitor check is scheduled for  11/30/22. This is NOT an in-clinic appointment. This transmission is sent from your home monitor. Please make sure your home monitor is plugged into power and within 10 feet of where you sleep. If you are using the phone applications, please make sure it is open on your smart phone. If you have difficulty sending a transmission, please do NOT call our office. Instead, call tech support for your device as they are better able to assist.    Home Monitoring [phone]    If you have any questions, please call the Pacemaker/ICD clinic that follows you. We appreciate you staying remotely connected!     Sincerely,    Katrina Graff RN, BSN  Device Coordinator RN  [facility]  [phone]  [id number]  [phone]

## 2022-09-02 NOTE — PROGRESS NOTES
Chargeable vvi icd remote      Normal ICD function with 0% RV pacing (set vvi 50) & no tachy events other than known SVT. Since 5/5/22: 14 SVT episodes on various dates - longest 3 min 19 sec. Up to 170's. See scanned report in  for details.

## 2022-09-07 ENCOUNTER — OFFICE VISIT (OUTPATIENT)
Dept: FAMILY MEDICINE CLINIC | Age: 47
End: 2022-09-07
Payer: COMMERCIAL

## 2022-09-07 VITALS
SYSTOLIC BLOOD PRESSURE: 136 MMHG | HEART RATE: 60 BPM | DIASTOLIC BLOOD PRESSURE: 80 MMHG | BODY MASS INDEX: 29.04 KG/M2 | HEIGHT: 68 IN | OXYGEN SATURATION: 98 % | RESPIRATION RATE: 16 BRPM | TEMPERATURE: 97.4 F | WEIGHT: 191.6 LBS

## 2022-09-07 DIAGNOSIS — G57.02 PYRIFORMIS SYNDROME, LEFT: ICD-10-CM

## 2022-09-07 DIAGNOSIS — R74.8 ELEVATED CPK: ICD-10-CM

## 2022-09-07 DIAGNOSIS — Z00.00 ROUTINE GENERAL MEDICAL EXAMINATION AT A HEALTH CARE FACILITY: Primary | ICD-10-CM

## 2022-09-07 DIAGNOSIS — I47.1 PAT (PAROXYSMAL ATRIAL TACHYCARDIA) (HCC): ICD-10-CM

## 2022-09-07 LAB
ALBUMIN SERPL-MCNC: 4.2 G/DL (ref 3.5–5)
ALBUMIN/GLOB SERPL: 1.2 {RATIO} (ref 1.1–2.2)
ALP SERPL-CCNC: 67 U/L (ref 45–117)
ALT SERPL-CCNC: 38 U/L (ref 12–78)
ANION GAP SERPL CALC-SCNC: 6 MMOL/L (ref 5–15)
APPEARANCE UR: CLEAR
AST SERPL-CCNC: 22 U/L (ref 15–37)
BASOPHILS # BLD: 0.1 K/UL (ref 0–0.1)
BASOPHILS NFR BLD: 1 % (ref 0–1)
BILIRUB SERPL-MCNC: 1.1 MG/DL (ref 0.2–1)
BILIRUB UR QL: NEGATIVE
BUN SERPL-MCNC: 14 MG/DL (ref 6–20)
BUN/CREAT SERPL: 12 (ref 12–20)
CALCIUM SERPL-MCNC: 9.7 MG/DL (ref 8.5–10.1)
CHLORIDE SERPL-SCNC: 105 MMOL/L (ref 97–108)
CHOLEST SERPL-MCNC: 197 MG/DL
CK SERPL-CCNC: 199 U/L (ref 39–308)
CO2 SERPL-SCNC: 29 MMOL/L (ref 21–32)
COLOR UR: NORMAL
CREAT SERPL-MCNC: 1.15 MG/DL (ref 0.7–1.3)
DIFFERENTIAL METHOD BLD: NORMAL
EOSINOPHIL # BLD: 0.1 K/UL (ref 0–0.4)
EOSINOPHIL NFR BLD: 2 % (ref 0–7)
ERYTHROCYTE [DISTWIDTH] IN BLOOD BY AUTOMATED COUNT: 14.1 % (ref 11.5–14.5)
EST. AVERAGE GLUCOSE BLD GHB EST-MCNC: 134 MG/DL
GLOBULIN SER CALC-MCNC: 3.5 G/DL (ref 2–4)
GLUCOSE SERPL-MCNC: 102 MG/DL (ref 65–100)
GLUCOSE UR STRIP.AUTO-MCNC: NEGATIVE MG/DL
HBA1C MFR BLD: 6.3 % (ref 4–5.6)
HCT VFR BLD AUTO: 47 % (ref 36.6–50.3)
HDLC SERPL-MCNC: 47 MG/DL
HDLC SERPL: 4.2 {RATIO} (ref 0–5)
HGB BLD-MCNC: 15.3 G/DL (ref 12.1–17)
HGB UR QL STRIP: NEGATIVE
IMM GRANULOCYTES # BLD AUTO: 0 K/UL (ref 0–0.04)
IMM GRANULOCYTES NFR BLD AUTO: 0 % (ref 0–0.5)
KETONES UR QL STRIP.AUTO: NEGATIVE MG/DL
LDLC SERPL CALC-MCNC: 121.6 MG/DL (ref 0–100)
LEUKOCYTE ESTERASE UR QL STRIP.AUTO: NEGATIVE
LYMPHOCYTES # BLD: 2.5 K/UL (ref 0.8–3.5)
LYMPHOCYTES NFR BLD: 40 % (ref 12–49)
MCH RBC QN AUTO: 28.4 PG (ref 26–34)
MCHC RBC AUTO-ENTMCNC: 32.6 G/DL (ref 30–36.5)
MCV RBC AUTO: 87.4 FL (ref 80–99)
MONOCYTES # BLD: 0.4 K/UL (ref 0–1)
MONOCYTES NFR BLD: 6 % (ref 5–13)
NEUTS SEG # BLD: 3.3 K/UL (ref 1.8–8)
NEUTS SEG NFR BLD: 51 % (ref 32–75)
NITRITE UR QL STRIP.AUTO: NEGATIVE
NRBC # BLD: 0 K/UL (ref 0–0.01)
NRBC BLD-RTO: 0 PER 100 WBC
PH UR STRIP: 7 [PH] (ref 5–8)
PLATELET # BLD AUTO: 388 K/UL (ref 150–400)
PMV BLD AUTO: 9.4 FL (ref 8.9–12.9)
POTASSIUM SERPL-SCNC: 4.7 MMOL/L (ref 3.5–5.1)
PROT SERPL-MCNC: 7.7 G/DL (ref 6.4–8.2)
PROT UR STRIP-MCNC: NEGATIVE MG/DL
RBC # BLD AUTO: 5.38 M/UL (ref 4.1–5.7)
SODIUM SERPL-SCNC: 140 MMOL/L (ref 136–145)
SP GR UR REFRACTOMETRY: <1.005 (ref 1–1.03)
TRIGL SERPL-MCNC: 142 MG/DL (ref ?–150)
TSH SERPL DL<=0.05 MIU/L-ACNC: 1.17 UIU/ML (ref 0.36–3.74)
UROBILINOGEN UR QL STRIP.AUTO: 0.2 EU/DL (ref 0.2–1)
VLDLC SERPL CALC-MCNC: 28.4 MG/DL
WBC # BLD AUTO: 6.3 K/UL (ref 4.1–11.1)

## 2022-09-07 PROCEDURE — 99396 PREV VISIT EST AGE 40-64: CPT | Performed by: FAMILY MEDICINE

## 2022-09-07 NOTE — PROGRESS NOTES
HISTORY OF PRESENT ILLNESS  Law Andres is a 55 y.o. male. Patient was seen today for complete physical checkup and to update health maintenance. History of Brugada syndrome and paroxysmal atrial tachycardia. He has internal cardiac defibrillator and follows cardiology. Complains of left lower back pain since he lifted heavy weight during his stay in [facility] last month. HPI  Health Maintenance  Immunizations:    Influenza: he will plan on getting it this fall. Tetanus: up to date. Shingles: Not applicable. Pneumonia: up to date. Cancer screening:     Colon: reviewed guidelines, up to date. Prostate: reviewed guidelines, . Cardiovascular Review  The patient has hyperlipidemia and Brugada sx, s/p AICD. He reports taking medications as instructed, no medication side effects noted, no chest pain on exertion, no dyspnea on exertion, no swelling of ankles, no orthostatic dizziness or lightheadedness, no orthopnea or paroxysmal nocturnal dyspnea, no palpitations, no intermittent claudication symptoms, no muscle aches or pain. Diet and Lifestyle: generally follows a low fat low cholesterol diet, generally follows a low sodium diet, follows a diabetic diet regularly, exercises regularly. Lab review: labs reviewed and discussed with patient. Medicines: On Simvastatin 10 mg and Fish oil  He follows cardiology on annual bases and gets AICD interrogation and monitoring remotely. He has side effect of statin in form pf elevated CPK. Is on Simvastatin 10 mg and ASA 81 mg        Endocrine Review  He is seen for prediabetes and thyroid enlargement. Since last visit he reports: no polyuria or polydipsia, no chest pain, dyspnea or TIA's, no numbness, tingling or pain in extremities, no unusual visual symptoms, no hypoglycemia, no significant changes. Home glucose monitoring: is not performed  no  n/a - not on medications (diet controlled). Medication side effects: n/a.   Diabetic diet compliance: compliant all of the time. Lab review: labs reviewed and discussed with patient. Eye exam: UTD. Started on Metformin  mg    Back pain     Norma Ashton is a 55 y.o. male who complains of low back pain for 1 month, positional with bending or lifting, without radiation down the legs. Precipitating factors: recent heavy lifting. Prior history of back problems: no prior back problems. There is not numbness in the legs. Symptoms are worst: During certain activities. Alleviating factors identifiable by patient are recumbency, medication over-the-counter pain medications. Exacerbating factors identifiable by patient are bending forwards, bending backwards, bending sideways. . Patient denies dysuria, hematuria, nausea, vomiting, diarrhea, constipation, new onset enuresis  Patient does not have a history of recurrent UTI. He lifted heavy bags during his trip and one of the time he had a popping sound in his back. Patient Care Team:  Mariella Murillo MD as PCP - General (Family Medicine)  Mariella Murillo MD as PCP - [address] [place], [place] [address]led Provider  Jia De Leon MD as Consulting Provider (Endocrinology Physician)  Zach Flores MD (Cardiovascular Disease Physician)       The following sections were reviewed & updated as appropriate: PMH, PSH, FH, and SH. Review of Systems   Constitutional:  Negative for chills, fever and malaise/fatigue. HENT:  Negative for congestion, ear pain, sore throat and tinnitus. Eyes:  Negative for blurred vision, double vision, pain and discharge. Respiratory:  Negative for cough, shortness of breath and wheezing. Cardiovascular:  Negative for chest pain, palpitations and leg swelling. Gastrointestinal:  Negative for abdominal pain, blood in stool, constipation, diarrhea, nausea and vomiting. Genitourinary:  Negative for dysuria, frequency, hematuria and urgency. Musculoskeletal:  Positive for back pain. Negative for joint pain and myalgias.    Skin: Negative for rash. Neurological:  Negative for dizziness, tremors, seizures and headaches. Endo/Heme/Allergies:  Negative for polydipsia. Does not bruise/bleed easily. Psychiatric/Behavioral:  Negative for depression and substance abuse. The patient is not nervous/anxious. Physical Exam  Vitals and nursing note reviewed. Constitutional:       Appearance: He is well-developed. He is not diaphoretic. HENT:      Head: Normocephalic and atraumatic. Right Ear: External ear normal.      Mouth/Throat:      Mouth: Mucous membranes are moist.      Pharynx: No oropharyngeal exudate. Eyes:      General: No scleral icterus. Conjunctiva/sclera: Conjunctivae normal.      Pupils: Pupils are equal, round, and reactive to light. Neck:      Thyroid: No thyromegaly. Vascular: No JVD. Cardiovascular:      Rate and Rhythm: Normal rate and regular rhythm. Heart sounds: Normal heart sounds. No murmur heard. Pulmonary:      Effort: Pulmonary effort is normal.      Breath sounds: Normal breath sounds. No wheezing. Abdominal:      General: Bowel sounds are normal. There is no distension. Palpations: Abdomen is soft. There is no mass. Musculoskeletal:         General: No tenderness. Normal range of motion. Cervical back: Normal range of motion and neck supple. Back:    Lymphadenopathy:      Cervical: No cervical adenopathy. Skin:     General: Skin is warm and dry. Findings: No rash. Neurological:      Mental Status: He is alert and oriented to person, place, and time. Cranial Nerves: No cranial nerve deficit. Deep Tendon Reflexes: Reflexes are normal and symmetric. Reflexes normal.   Psychiatric:         Mood and Affect: Mood normal.         Behavior: Behavior normal.       ASSESSMENT and PLAN  Diagnoses and all orders for this visit:    1. Routine general medical examination at a health care facility  -     CBC WITH AUTOMATED DIFF;  Future  -     HEMOGLOBIN A1C WITH EAG; Future  -     LIPID PANEL; Future  -     METABOLIC PANEL, COMPREHENSIVE; Future  -     TSH 3RD GENERATION; Future  -     URINALYSIS W/ RFLX MICROSCOPIC; Future    2. PAT (paroxysmal atrial tachycardia) (HCC)  Assessment & Plan:   monitored by specialist. No acute findings meriting change in the plan      3. Pyriformis syndrome, left    4. Elevated CPK  -     CK; Future  Patient with likely piriformis syndrome or mild gluteal muscle strain. Exercises were demonstrated and printout was given. Discussed lifestyle issues and health guidance given  Patient was given an after visit summary which includes diagnoses, vital signs, current medications, instructions and references & authorized prescriptions . Results of labs will be conveyed to patient, once available. Pt verbalized instructions I provided and expressed understanding of discussion that was held today. Follow-up and Dispositions    Return in about 6 months (around 3/7/2023) for follow up, fasting. Please note that this dictation was completed with [employer], the computer voice recognition software. Quite often unanticipated grammatical, syntax, homophones, and other interpretive errors are inadvertently transcribed by the computer software. Please disregard these errors. Please excuse any errors that have escaped final proofreading. Thank you.

## 2022-09-07 NOTE — PROGRESS NOTES
Chief Complaint   Patient presents with    Physical     Fasting     Back Pain     Lower back pain, approx: 3 weeks, pain on and off.         1. \"Have you been to the ER, urgent care clinic since your last visit? Hospitalized since your last visit? \" No    2. \"Have you seen or consulted any other health care providers outside of the [address] [place] [address] since your last visit? \" No     3. For patients aged 39-70: Has the patient had a colonoscopy / FIT/ Cologuard? Yes - no Care Gap present      If the patient is female:    4. For patients aged 41-77: Has the patient had a mammogram within the past 2 years? No      5. For patients aged 21-65: Has the patient had a pap smear?  NA - based on age or sex         3 most recent PHQ Screens 6/21/2022   Little interest or pleasure in doing things Not at all   Feeling down, depressed, irritable, or hopeless Not at all   Total Score PHQ 2 0       Health Maintenance Due   Topic Date Due    Flu Vaccine (1) 09/01/2022

## 2022-09-08 ENCOUNTER — TELEPHONE (OUTPATIENT)
Dept: CARDIOLOGY CLINIC | Age: 47
End: 2022-09-08

## 2022-09-08 NOTE — TELEPHONE ENCOUNTER
ID verified using two patient identifiers     Called to discuss recent SVT episodes at random times througout waking hours - denies any symptoms of palpitations, dizziness or other. [place] & recent increased activity level. SVT in 150's - episodes yesterday lasting 59 sec. No Tx initiated or delivered. Instructed him to continue medications as prescribed & hydrate. Will continue to monitor episodes. Appreciative of call.

## 2022-09-08 NOTE — PROGRESS NOTES
Ulises Santizo  I have reviewed your results. Results for thyroid function, kidney and liver function, urine analysis, blood count are normal.  A1c, average sugar is significantly up again, likely from your in your visit. Please make sure you take your metformin very regularly and watch her diet strictly for carbohydrates. Your cholesterol results are showing normal total and good cholesterol but bad cholesterol is borderline. Due to your underlying heart condition, we need to keep it below 100. Before increasing dose of simvastatin, I want you to work on lifestyle modifications. Please watch diet and exercise regularly. Continue with current medications and let me know if you have any questions, thanks.

## 2022-09-09 NOTE — PROGRESS NOTES
Results discussed with patient by Dr. Saritha Giordano via Allen Parker, patient has reviewed results.   Massiel Mitchell LPN

## 2022-09-23 ENCOUNTER — TELEPHONE (OUTPATIENT)
Dept: FAMILY MEDICINE CLINIC | Age: 47
End: 2022-09-23

## 2022-09-23 ENCOUNTER — PATIENT MESSAGE (OUTPATIENT)
Dept: FAMILY MEDICINE CLINIC | Age: 47
End: 2022-09-23

## 2022-09-23 NOTE — TELEPHONE ENCOUNTER
Pt dropped off [address] on 9/23/22 @ 11:25 am for  to complete. Blank Form has been scanned into CC and placed in Provider's Bin.

## 2022-09-26 ENCOUNTER — OFFICE VISIT (OUTPATIENT)
Dept: ENDOCRINOLOGY | Age: 47
End: 2022-09-26
Payer: COMMERCIAL

## 2022-09-26 VITALS
HEART RATE: 71 BPM | DIASTOLIC BLOOD PRESSURE: 90 MMHG | HEIGHT: 68 IN | BODY MASS INDEX: 28.98 KG/M2 | WEIGHT: 191.2 LBS | SYSTOLIC BLOOD PRESSURE: 138 MMHG

## 2022-09-26 DIAGNOSIS — E04.1 NODULE OF LEFT LOBE OF THYROID GLAND: Primary | ICD-10-CM

## 2022-09-26 PROCEDURE — 99204 OFFICE O/P NEW MOD 45 MIN: CPT | Performed by: INTERNAL MEDICINE

## 2022-09-26 NOTE — PATIENT INSTRUCTIONS
1) TSH is a thyroid test.  Your level is normal so you don't have any problem with your thyroid function at this time that needs further evaluation or treatment. 2) I placed the order for your repeat thyroid ultrasound. Please call the Patient Care team at [phone] to schedule this appointment at your earliest convenience and I'll contact you over [employer] with the results. 3) As long as this does not show any change, then you won't need further ultrasounds in the future and won't need to come back and see me.   Your PCP can check your TSH on your annual physical to ensure it remains normal.

## 2022-09-26 NOTE — PROGRESS NOTES
Chief Complaint   Patient presents with    New Patient     PCP and pharmacy confirmed    Thyroid Problem     History of Present Illness: Cezar Cadet is a 52 y.o. male who is a new patient referred by Dr. Dewey Wilcox for a thyroid nodule. Dr. Dewey Wilcox initially ordered an ultrasound in 8/16 based on feeling his gland was enlarged on exam and this showed a 4 mm left lobe nodule. Saw my previous partner, Dr. Newton Chávez, in 10/16 and she was not concerned by this finding and simply recommended a repeat ultrasound and this was done in May 2017 and showed the nodule was stable so he never needed any biopsy. Has not had any further ultrasounds since then and Dr. Dewey Wilcox saw him in Jan 2022 and wanted him to get this checked one more time. No FH of thyroid disease or thyroid cancer. No exposure to neck radiation. Has had normal TSH values in our system dating back to 2016 and last was done in 9/22 and was 1.17. No dysphagia, dyspnea when supine, or hoarseness. Hasn't noticed any change in the size of his neck. No anterior neck pain. Has started intermittent fasting to help with his weight and borderline DM. Is taking metformin 1 tab daily for this. Past Medical History:   Diagnosis Date    Brugada syndrome     Hypercholesteremia     Impaired glucose tolerance     Nodule of left lobe of thyroid gland      Past Surgical History:   Procedure Laterality Date    INS PPM/ICD LED SING ONLY  2/2/2015          Current Outpatient Medications   Medication Sig    simvastatin (ZOCOR) 10 mg tablet TAKE 1 TABLET BY MOUTH EVERY DAY AT NIGHT    metFORMIN ER (GLUCOPHAGE XR) 500 mg tablet TAKE 1 TAB BY MOUTH DAILY (WITH DINNER). calcium-vitamin D (OYSTER SHELL) 500 mg(1,250mg) -200 unit per tablet Take 1 Tab by mouth daily. fexofenadine (ALLEGRA) 180 mg tablet Take 180 mg by mouth daily as needed for Allergies. aspirin delayed-release 81 mg tablet Take 81 mg by mouth daily.     fish oil-omega-3 fatty acids 340-1,000 mg capsule Take 2 Caps by mouth daily. No current facility-administered medications for this visit. No Known Allergies    Family History   Problem Relation Age of Onset    Diabetes Father     Elevated Lipids Father     Hypertension Father     Heart Disease Father     Hypertension Mother     Diabetes Mother     Elevated Lipids Brother     Hypertension Brother      Social History     Socioeconomic History    Marital status:      Spouse name: Not on file    Number of children: Not on file    Years of education: Not on file    Highest education level: Not on file   Occupational History    Not on file   Tobacco Use    Smoking status: Never    Smokeless tobacco: Never   Vaping Use    Vaping Use: Never used   Substance and Sexual Activity    Alcohol use: No     Alcohol/week: 0.0 standard drinks    Drug use: No    Sexual activity: Yes     Partners: Female   Other Topics Concern    Not on file   Social History Narrative    L[place] with his wife and twin girls who will be 15 in October 2022. Works as a  at [employer]. Likes to garden and watch [employer]. Social Determinants of Health     Financial Resource Strain: Low Risk     Difficulty of Paying Living Expenses: Not very hard   Food Insecurity: No Food Insecurity    Worried About Running Out of Food in the Last Year: Never true    Ran Out of Food in the Last Year: Never true   Transportation Needs: Not on file   Physical Activity: Not on file   Stress: Not on file   Social Connections: Not on file   Intimate Partner Violence: Not on file   Housing Stability: Not on file     Review of Systems:  Per HPI    Physical Examination:  Blood pressure (!) 138/90, pulse 71, height 5' 8\" (1.727 m), weight 191 lb 3.2 oz (86.7 kg).   General: pleasant, no distress, good eye contact  HEENT: no exopthalmos, no periorbital edema, no scleral/conjunctival injection, EOMI,   Neck: supple, (+) goiter 1.5x normal but can't feel any discrete nodules, no masses, lymph nodes, or carotid bruits, no supraclavicular or dorsocervical fat pads  Cardiovascular: regular, normal rate, normal S1 and S2, no murmurs/rubs/gallops   Respiratory: clear to auscultation bilaterally  Gastrointestinal: soft, nontender, nondistended, no masses, no hepatosplenomegaly  Musculoskeletal: no proximal muscle weakness in upper or lower extremities  Integumentary: no acanthosis nigricans, no rashes, no edema  Neurological: reflexes 2+ at biceps, no tremor  Psychiatric: normal mood and affect    Data Reviewed:   Component      Latest Ref Rng & Units 9/7/2022          10:38 AM   TSH      0.36 - 3.74 uIU/mL 1.17     - thyroid ultrasound report (May 2017)  EXAM:  US THYROID/PARATHYROID/SOFT TISS      INDICATION: Follow-up left-sided nodule. COMPARISON: 8/31/2016. TECHNIQUE: Real-time sonography of the thyroid gland was performed with a high  frequency linear transducer. Multiple static images were obtained. FINDINGS:  There is a 4 x 3 x 3 mm nodule upper pole left thyroid which contains a tiny  focus of calcification in this is unchanged. No dominant nodules are  identified. The right lobe measures 6.3 x 1.4 x 2.3 cm and the left lobe measures 5.9 x 1.4  x 1.7 cm. The isthmus measures 2 mm. IMPRESSION  IMPRESSION: No change in the 4 mm nodule left thyroid. Assessment/Plan:   1. Nodule of left lobe of thyroid gland: Dr. Kevin Singletary initially ordered an ultrasound in 8/16 based on feeling his gland was enlarged on exam and this showed a 4 mm left lobe nodule. Saw my previous partner, Dr. Kiana Ramírez, in 10/16 and she was not concerned by this finding and simply recommended a repeat ultrasound and this was done in May 2017 and showed the nodule was stable so he never needed any biopsy. Has not had any further ultrasounds since then and Dr. Kevin Singletary saw him in Jan 2022 and wanted him to get this checked one more time. No FH of thyroid disease or thyroid cancer.   No exposure to neck radiation. Has had normal TSH values in our system dating back to 2016 and last was done in 9/22 and was 1.17. No dysphagia, dyspnea when supine, or hoarseness. Hasn't noticed any change in the size of his neck. Will order one more ultrasound and provided this doesn't show any change, will not need to have further ultrasounds of his neck. Can have his TSH checked annually with PCP to ensure it remains normal.  - order thyroid ultrasound now  - check TSH annually, next in 9/23      Patient Instructions   1) TSH is a thyroid test.  Your level is normal so you don't have any problem with your thyroid function at this time that needs further evaluation or treatment. 2) I placed the order for your repeat thyroid ultrasound. Please call the Patient Care team at [phone] to schedule this appointment at your earliest convenience and I'll contact you over [employer] with the results. 3) As long as this does not show any change, then you won't need further ultrasounds in the future and won't need to come back and see me. Your PCP can check your TSH on your annual physical to ensure it remains normal.          Follow-up and Dispositions    Return to be determined based on ultrasound results. Copy sent to:  Gardenia Fatima MD as PCP - General (Family Medicine)  Franny Berry MD (Cardiovascular Disease Physician)      Addendum: 10/13/22  EXAM: US THYROID/PARATHYROID/SOFT TISS      INDICATION: Thyroid nodule follow-up. COMPARISON: 5/23/2017. TECHNIQUE: Real-time sonography of the thyroid gland was performed with a high  frequency linear transducer. Multiple static images were obtained. FINDINGS:  There is a stable 5 mm nodule in the upper pole left gland. Thyroid echotexture  is somewhat heterogeneous throughout. Bilateral cervical araceli survey was performed, with cine images of the bilateral  neck, from submandibular glands to clavicles. No cervical lymphadenopathy is  shown. The right lobe measures 5.9 x 2.0 x 2.0 cm and the left lobe measures 5.8 x 1.9  x 1.5 cm. The isthmus measures 0.2 cm. IMPRESSION  Thyromegaly again demonstrated. Stable tiny nodule left gland.  -------------------------------------------------------------------------------------------------------------------  Sent him the following message through [employer]:  Your thyroid ultrasound shows your nodule in the left lobe is stable at 5 mm (up from 4 mm in 2017). Given this is such a minimal difference, this nodule is likely to benign (non-cancerous) and therefore doesn't need to be followed in the future and you won't need to come back and see me going forward. no

## 2022-09-26 NOTE — TELEPHONE ENCOUNTER
Chief Complaint   Patient presents with    Form Completion     [employer] [facility] screening form completed and faxed to [phone] with confirmation received. Patient informed via [employer], copy sent to patient.   Demetrio Gross LPN

## 2022-10-03 ENCOUNTER — TELEPHONE (OUTPATIENT)
Dept: CARDIOLOGY CLINIC | Age: 47
End: 2022-10-03

## 2022-10-03 NOTE — TELEPHONE ENCOUNTER
ID verified using two patient identifiers     Called to assess for symptoms at 12:03 am on 10/3/22 for tachy around 175 bpm lasting 2 minutes. He reports have relations with his wife at that time & no symptoms. Instructed him to report symptoms of dizziness, palpitations or chest pains  during fast heart rates - agrees with plan. No action needed.

## 2022-10-11 ENCOUNTER — HOSPITAL ENCOUNTER (OUTPATIENT)
Dept: ULTRASOUND IMAGING | Age: 47
Discharge: HOME | End: 2022-10-11
Attending: INTERNAL MEDICINE
Payer: COMMERCIAL

## 2022-10-11 DIAGNOSIS — E04.1 NODULE OF LEFT LOBE OF THYROID GLAND: ICD-10-CM

## 2022-10-11 PROCEDURE — 76536 US EXAM OF HEAD AND NECK: CPT

## 2022-11-30 ENCOUNTER — OFFICE VISIT (OUTPATIENT)
Dept: CARDIOLOGY CLINIC | Age: 47
End: 2022-11-30
Payer: COMMERCIAL

## 2022-11-30 DIAGNOSIS — Z95.810 AUTOMATIC IMPLANTABLE CARDIAC DEFIBRILLATOR IN SITU: Primary | ICD-10-CM

## 2023-03-13 DIAGNOSIS — R73.03 PREDIABETES: ICD-10-CM

## 2023-03-13 RX ORDER — SIMVASTATIN 10 MG/1
TABLET, FILM COATED ORAL
Qty: 90 TABLET | Refills: 0 | Status: SHIPPED | OUTPATIENT
Start: 2023-03-13

## 2023-03-13 RX ORDER — METFORMIN HYDROCHLORIDE 500 MG/1
500 TABLET, EXTENDED RELEASE ORAL
Qty: 90 TABLET | Refills: 0 | Status: SHIPPED | OUTPATIENT
Start: 2023-03-13

## 2023-06-05 ENCOUNTER — NURSE ONLY (OUTPATIENT)
Age: 48
End: 2023-06-05

## 2023-06-05 DIAGNOSIS — Z95.810 PRESENCE OF AUTOMATIC CARDIOVERTER/DEFIBRILLATOR (AICD): Primary | ICD-10-CM

## 2023-07-03 ENCOUNTER — OFFICE VISIT (OUTPATIENT)
Age: 48
End: 2023-07-03
Payer: COMMERCIAL

## 2023-07-03 VITALS
OXYGEN SATURATION: 98 % | DIASTOLIC BLOOD PRESSURE: 83 MMHG | SYSTOLIC BLOOD PRESSURE: 139 MMHG | HEIGHT: 68 IN | RESPIRATION RATE: 16 BRPM | WEIGHT: 192 LBS | BODY MASS INDEX: 29.1 KG/M2 | HEART RATE: 83 BPM | TEMPERATURE: 97.6 F

## 2023-07-03 DIAGNOSIS — I47.1 PAT (PAROXYSMAL ATRIAL TACHYCARDIA) (HCC): ICD-10-CM

## 2023-07-03 DIAGNOSIS — R73.03 PREDIABETES: ICD-10-CM

## 2023-07-03 DIAGNOSIS — Z95.810 S/P ICD (INTERNAL CARDIAC DEFIBRILLATOR) PROCEDURE: ICD-10-CM

## 2023-07-03 DIAGNOSIS — I49.8 BRUGADA SYNDROME: ICD-10-CM

## 2023-07-03 DIAGNOSIS — E78.5 HYPERLIPIDEMIA LDL GOAL <100: Primary | ICD-10-CM

## 2023-07-03 PROCEDURE — 99213 OFFICE O/P EST LOW 20 MIN: CPT | Performed by: FAMILY MEDICINE

## 2023-07-03 SDOH — ECONOMIC STABILITY: FOOD INSECURITY: WITHIN THE PAST 12 MONTHS, THE FOOD YOU BOUGHT JUST DIDN'T LAST AND YOU DIDN'T HAVE MONEY TO GET MORE.: NEVER TRUE

## 2023-07-03 SDOH — ECONOMIC STABILITY: INCOME INSECURITY: HOW HARD IS IT FOR YOU TO PAY FOR THE VERY BASICS LIKE FOOD, HOUSING, MEDICAL CARE, AND HEATING?: NOT HARD AT ALL

## 2023-07-03 SDOH — ECONOMIC STABILITY: FOOD INSECURITY: WITHIN THE PAST 12 MONTHS, YOU WORRIED THAT YOUR FOOD WOULD RUN OUT BEFORE YOU GOT MONEY TO BUY MORE.: NEVER TRUE

## 2023-07-03 SDOH — ECONOMIC STABILITY: HOUSING INSECURITY
IN THE LAST 12 MONTHS, WAS THERE A TIME WHEN YOU DID NOT HAVE A STEADY PLACE TO SLEEP OR SLEPT IN A SHELTER (INCLUDING NOW)?: NO

## 2023-07-03 ASSESSMENT — PATIENT HEALTH QUESTIONNAIRE - PHQ9
SUM OF ALL RESPONSES TO PHQ QUESTIONS 1-9: 0
SUM OF ALL RESPONSES TO PHQ QUESTIONS 1-9: 0
SUM OF ALL RESPONSES TO PHQ9 QUESTIONS 1 & 2: 0
SUM OF ALL RESPONSES TO PHQ QUESTIONS 1-9: 0
1. LITTLE INTEREST OR PLEASURE IN DOING THINGS: 0
SUM OF ALL RESPONSES TO PHQ QUESTIONS 1-9: 0
2. FEELING DOWN, DEPRESSED OR HOPELESS: 0

## 2023-07-03 ASSESSMENT — ENCOUNTER SYMPTOMS
SORE THROAT: 0
NAUSEA: 0
BACK PAIN: 0
COUGH: 0
SHORTNESS OF BREATH: 0
ABDOMINAL PAIN: 0
WHEEZING: 0
DIARRHEA: 0
CONSTIPATION: 0

## 2023-07-03 NOTE — PROGRESS NOTES
Date:7/3/2023        Patient Name:Luis Felipe Rodriguez     YOB: 1975     Age:47 y.o. Seen today for   Chief Complaint   Patient presents with    Cholesterol Problem     Follow up       HPI      Cardiovascular Review  The patient has hyperlipidemia and Brugada sx, s/p AICD. He reports taking medications as instructed, no medication side effects noted, no chest pain on exertion, no dyspnea on exertion,  no swelling of ankles, no orthostatic dizziness or lightheadedness, no orthopnea or paroxysmal nocturnal dyspnea, no palpitations, no intermittent claudication symptoms, no muscle aches or pain. Diet and Lifestyle: generally follows a low fat low cholesterol  diet, generally follows a low sodium diet, follows a diabetic diet regularly, exercises regularly. Lab review: labs reviewed and discussed with patient. Medicines: On Simvastatin 10 mg and Fish oil   He follows cardiology on annual bases and gets AICD interrogation and monitoring remotely. He has side effect of statin in form pf elevated CPK. Is on Simvastatin 10 mg and ASA 81 mg           Endocrine Review  He is seen for prediabetes and thyroid enlargement. Since last visit he reports: no polyuria or polydipsia, no chest pain, dyspnea or TIA's, no numbness, tingling or pain in extremities,  no unusual visual symptoms, no hypoglycemia, no significant changes. Home glucose monitoring: is not performed  no  n/a - not on medications (diet controlled). Medication side effects: n/a. Diabetic diet compliance: compliant all of the time. Lab review: labs reviewed and discussed with patient. Eye exam: UTD. Started on Metformin  mg  Review of Systems   Review of Systems   Constitutional:  Negative for chills, fatigue and fever. HENT:  Negative for congestion, ear pain and sore throat. Eyes:  Negative for visual disturbance. Respiratory:  Negative for cough, shortness of breath and wheezing.     Cardiovascular:  Negative for

## 2023-07-03 NOTE — PROGRESS NOTES
Chief Complaint   Patient presents with    Cholesterol Problem     Follow up         1. \"Have you been to the ER, urgent care clinic since your last visit? Hospitalized since your last visit? \"     no       2. \"Have you seen or consulted any other health care providers outside of the [address] [place] [address] since your last visit? \"       no     3. For patients aged 43-73: Has the patient had a colonoscopy / FIT/ Cologuard? Yes      If the patient is female: 4. For patients aged 43-66: Has the patient had a mammogram within the past 2 years? NA - based on age or sex      11. For patients aged 21-65: Has the patient had a pap smear?  NA - based on age or sex      PHQ-9  7/3/2023   Little interest or pleasure in doing things 0   Little interest or pleasure in doing things -   Feeling down, depressed, or hopeless 0   PHQ-2 Score 0   Total Score PHQ 2 -   PHQ-9 Total Score 0           Financial Resource Strain: Low Risk     Difficulty of Paying Living Expenses: Not hard at all      Food Insecurity: No Food Insecurity    Worried About Running Out of Food in the Last Year: Never true    Ran Out of Food in the Last Year: Never true          Health Maintenance Due   Topic Date Due    HIV screen  Never done    DTaP/Tdap/Td vaccine (1 - Tdap) Never done    COVID-19 Vaccine (4 - Booster for Pfizer series) 02/06/2022    Depression Screen  06/21/2023

## 2023-07-03 NOTE — ASSESSMENT & PLAN NOTE
Monitored by specialist- no acute findings meriting change in the plan   Follows Dr. Dayana Max. S/p ICD for Brugada syndrome.

## 2023-07-06 DIAGNOSIS — Z95.810 S/P ICD (INTERNAL CARDIAC DEFIBRILLATOR) PROCEDURE: ICD-10-CM

## 2023-07-06 DIAGNOSIS — I49.8 BRUGADA SYNDROME: ICD-10-CM

## 2023-07-06 DIAGNOSIS — R73.03 PREDIABETES: ICD-10-CM

## 2023-07-06 DIAGNOSIS — E78.5 HYPERLIPIDEMIA LDL GOAL <100: ICD-10-CM

## 2023-07-06 DIAGNOSIS — I47.1 PAT (PAROXYSMAL ATRIAL TACHYCARDIA) (HCC): ICD-10-CM

## 2023-07-06 LAB
ALBUMIN SERPL-MCNC: 4 G/DL (ref 3.5–5)
ALBUMIN/GLOB SERPL: 1.3 (ref 1.1–2.2)
ALP SERPL-CCNC: 65 U/L (ref 45–117)
ALT SERPL-CCNC: 50 U/L (ref 12–78)
ANION GAP SERPL CALC-SCNC: 6 MMOL/L (ref 5–15)
AST SERPL-CCNC: 30 U/L (ref 15–37)
BILIRUB SERPL-MCNC: 1.1 MG/DL (ref 0.2–1)
BUN SERPL-MCNC: 15 MG/DL (ref 6–20)
BUN/CREAT SERPL: 14 (ref 12–20)
CALCIUM SERPL-MCNC: 9.4 MG/DL (ref 8.5–10.1)
CHLORIDE SERPL-SCNC: 103 MMOL/L (ref 97–108)
CHOLEST SERPL-MCNC: 197 MG/DL
CO2 SERPL-SCNC: 28 MMOL/L (ref 21–32)
CREAT SERPL-MCNC: 1.04 MG/DL (ref 0.7–1.3)
EST. AVERAGE GLUCOSE BLD GHB EST-MCNC: 131 MG/DL
GLOBULIN SER CALC-MCNC: 3 G/DL (ref 2–4)
GLUCOSE SERPL-MCNC: 99 MG/DL (ref 65–100)
HBA1C MFR BLD: 6.2 % (ref 4–5.6)
HDLC SERPL-MCNC: 46 MG/DL
HDLC SERPL: 4.3 (ref 0–5)
LDLC SERPL CALC-MCNC: 124.6 MG/DL (ref 0–100)
POTASSIUM SERPL-SCNC: 4.2 MMOL/L (ref 3.5–5.1)
PROT SERPL-MCNC: 7 G/DL (ref 6.4–8.2)
SODIUM SERPL-SCNC: 137 MMOL/L (ref 136–145)
TRIGL SERPL-MCNC: 132 MG/DL
VLDLC SERPL CALC-MCNC: 26.4 MG/DL

## 2023-07-07 DIAGNOSIS — R73.03 PREDIABETES: ICD-10-CM

## 2023-07-09 RX ORDER — SIMVASTATIN 10 MG
TABLET ORAL
Qty: 90 TABLET | Refills: 1 | Status: SHIPPED | OUTPATIENT
Start: 2023-07-09

## 2023-07-09 RX ORDER — METFORMIN HYDROCHLORIDE 500 MG/1
TABLET, EXTENDED RELEASE ORAL
Qty: 90 TABLET | Refills: 1 | Status: SHIPPED | OUTPATIENT
Start: 2023-07-09

## 2023-07-15 NOTE — TELEPHONE ENCOUNTER
Tell him, he can get OTC Diclofenac gel  Or Voltaren gel.  It is freely available in [facility] also comfortable appearance comfortable appearance/cooperative comfortable appearance/cooperative comfortable appearance/cooperative

## 2023-09-01 ENCOUNTER — PROCEDURE VISIT (OUTPATIENT)
Age: 48
End: 2023-09-01

## 2023-09-01 ENCOUNTER — TELEPHONE (OUTPATIENT)
Age: 48
End: 2023-09-01

## 2023-09-01 DIAGNOSIS — Z01.810 INPLANTABLE CARDIOVERTER-DEFIBRILLATOR IN PLACE, PRE-OPERATIVE CARDIOVASCULAR EXAMINATION: Primary | ICD-10-CM

## 2023-09-01 DIAGNOSIS — Z95.810 INPLANTABLE CARDIOVERTER-DEFIBRILLATOR IN PLACE, PRE-OPERATIVE CARDIOVASCULAR EXAMINATION: Primary | ICD-10-CM

## 2023-09-01 NOTE — TELEPHONE ENCOUNTER
2 patient ids. Called patient to reschedule appt. The patient offered to come in today vs rescheduling for a later date. Appt made for today at 2:20pm for device check. Patient will keep original appt with Bam Joseph on Tuesday 9/5.

## 2023-09-05 ENCOUNTER — OFFICE VISIT (OUTPATIENT)
Age: 48
End: 2023-09-05

## 2023-09-05 VITALS
WEIGHT: 192 LBS | RESPIRATION RATE: 16 BRPM | BODY MASS INDEX: 29.1 KG/M2 | OXYGEN SATURATION: 97 % | DIASTOLIC BLOOD PRESSURE: 80 MMHG | HEIGHT: 68 IN | HEART RATE: 65 BPM | SYSTOLIC BLOOD PRESSURE: 120 MMHG

## 2023-09-05 DIAGNOSIS — Z95.810 PRESENCE OF AUTOMATIC CARDIOVERTER/DEFIBRILLATOR (AICD): Primary | ICD-10-CM

## 2023-09-05 DIAGNOSIS — I49.8 BRUGADA SYNDROME: ICD-10-CM

## 2023-09-05 DIAGNOSIS — I47.1 SUPRAVENTRICULAR TACHYCARDIA (HCC): ICD-10-CM

## 2023-09-05 ASSESSMENT — PATIENT HEALTH QUESTIONNAIRE - PHQ9
SUM OF ALL RESPONSES TO PHQ QUESTIONS 1-9: 0
SUM OF ALL RESPONSES TO PHQ9 QUESTIONS 1 & 2: 0
1. LITTLE INTEREST OR PLEASURE IN DOING THINGS: 0
SUM OF ALL RESPONSES TO PHQ QUESTIONS 1-9: 0
SUM OF ALL RESPONSES TO PHQ QUESTIONS 1-9: 0
2. FEELING DOWN, DEPRESSED OR HOPELESS: 0
SUM OF ALL RESPONSES TO PHQ QUESTIONS 1-9: 0

## 2023-09-05 NOTE — PROGRESS NOTES
[facility]   Cardiac Electrophysiology Clinic Care Note               [x]Established visit     Patient Name: Napoleon Benedict - HGR: -    Primary Cardiologist: None   Electrophysiologist: Vikash Burnette MD     Reason for visit: ICD follow up     HPI:   Mr. Hollis Gaona is a 52 y.o.male who presents for follow up, is s/p Biotronik single lead ICD (DOI 2015) for Brugada syndrome with syncope. No syncope again for 8 years now    He reports doing well, denies chest pain, palpitations, SOB, PND, orthopnea, syncope, or edema. He has Apple Watch   Wife is with him today  He has twin daughter 14 yo    LVEF WNL in 2015 via echo. Previous:   S/p Biotronik single lead ICD 2015 for Brugada syndrome with syncope. Asymptomatic with PAT. Thyroid nodules on ultrasound, was referred to endocrinology. Assessment and Plan      Diagnosis Orders   1. Presence of automatic cardioverter/defibrillator (AICD)        2. Supraventricular tachycardia ([address])        3. Brugada syndrome              Biotronik single lead ICD (DOI 2015): Device check on 2023 showed proper lead & generator function. Generator longevity estimated 25%. No pacing. No recent VT/VF. Should he develop significant sinus bradycardia in the future, would recommend upgrade to dual chamber ICD. Brugada syndrome: No recent syncope or cardiac arrest  Continue to monitor for VT/VF via ICD. Will need ICD replacement in future  Daughters too young but recommend at least ECG, no indication of ICD for his twin daughter  Genetic question may not be useful if negative  In women, condition is less lethal but will need evaluation for them when they are older and ready to proceed  They will discuss at home    PAT: Asymptomatic. No medication indicated for control at this time. DM2: Managed by PCP. Hyperlipidemia: Managed by PCP.      Addendum  Echo 2023  Slight decrease in LVEF

## 2023-09-08 ENCOUNTER — PATIENT MESSAGE (OUTPATIENT)
Age: 48
End: 2023-09-08

## 2023-09-08 ENCOUNTER — TELEPHONE (OUTPATIENT)
Age: 48
End: 2023-09-08

## 2023-09-08 DIAGNOSIS — I49.8 BRUGADA SYNDROME: ICD-10-CM

## 2023-09-08 DIAGNOSIS — Z01.810 INPLANTABLE CARDIOVERTER-DEFIBRILLATOR IN PLACE, PRE-OPERATIVE CARDIOVASCULAR EXAMINATION: ICD-10-CM

## 2023-09-08 DIAGNOSIS — I47.1 SUPRAVENTRICULAR TACHYCARDIA (HCC): Primary | ICD-10-CM

## 2023-09-08 DIAGNOSIS — Z95.810 INPLANTABLE CARDIOVERTER-DEFIBRILLATOR IN PLACE, PRE-OPERATIVE CARDIOVASCULAR EXAMINATION: ICD-10-CM

## 2023-09-08 RX ORDER — METOPROLOL SUCCINATE 25 MG/1
25 TABLET, EXTENDED RELEASE ORAL DAILY
Qty: 90 TABLET | Refills: 3 | Status: SHIPPED | OUTPATIENT
Start: 2023-09-08

## 2023-09-08 NOTE — TELEPHONE ENCOUNTER
Identifiers x 2. Informed of results and MD recommendation. Verbalized understanding. Requested Prescriptions     Signed Prescriptions Disp Refills    metoprolol succinate (TOPROL XL) 25 MG extended release tablet 90 tablet 3     Sig: Take 1 tablet by mouth daily     Authorizing Provider: DERICK GIRON     Ordering User: Jesse Nielsen     Verbal order per Dr. Vicenta Jha.      Future Appointments   Date Time Provider [address]   12/19/2023  9:20 AM MD EVI Campbell BS AMB   9/19/2024  1:00 PM BSDEANNE SUAREZ ECHO 1 FRANCES BROOKS AMB   9/19/2024  2:00 PM PACEMAKER3, ERICK DANIELS BS AMB   9/19/2024  2:20 PM MD FRANCES Mendez BS AMB

## 2023-09-08 NOTE — TELEPHONE ENCOUNTER
----- Message from Vikash Burnette MD sent at 9/8/2023 12:04 AM EDT -----  Slight decrease in LVEF  Asymptomatic at visit with hx of atrial tachycardia  Recommend toprol xl 25 mg qd first     Continue to monitor with repeated echo next year follow up    Future Appointments  12/19/2023 9:20 AM    MD EVI Echols                BS AMB  9/19/2024  2:00 PM    PACEMAKER3, ERICK DANIELS              BS AMB  9/19/2024  2:20 PM    MD FRANCES Teixeira              BS AMB      Attempted to reach patient by telephone. A message was left for return call.

## 2023-12-21 DIAGNOSIS — R74.8 ELEVATED CPK: ICD-10-CM

## 2023-12-21 DIAGNOSIS — Z11.4 SCREENING FOR HIV WITHOUT PRESENCE OF RISK FACTORS: ICD-10-CM

## 2023-12-21 DIAGNOSIS — Z00.00 ENCOUNTER FOR GENERAL ADULT MEDICAL EXAMINATION WITHOUT ABNORMAL FINDINGS: ICD-10-CM

## 2023-12-21 LAB
ALBUMIN SERPL-MCNC: 4.1 G/DL (ref 3.5–5)
ALBUMIN/GLOB SERPL: 1.1 (ref 1.1–2.2)
ALP SERPL-CCNC: 66 U/L (ref 45–117)
ALT SERPL-CCNC: 45 U/L (ref 12–78)
ANION GAP SERPL CALC-SCNC: 3 MMOL/L (ref 5–15)
APPEARANCE UR: CLEAR
AST SERPL-CCNC: 27 U/L (ref 15–37)
BACTERIA URNS QL MICRO: NEGATIVE /HPF
BASOPHILS # BLD: 0 K/UL (ref 0–0.1)
BASOPHILS NFR BLD: 0 % (ref 0–1)
BILIRUB SERPL-MCNC: 0.7 MG/DL (ref 0.2–1)
BILIRUB UR QL: NEGATIVE
BUN SERPL-MCNC: 16 MG/DL (ref 6–20)
BUN/CREAT SERPL: 16 (ref 12–20)
CALCIUM SERPL-MCNC: 9.3 MG/DL (ref 8.5–10.1)
CHLORIDE SERPL-SCNC: 104 MMOL/L (ref 97–108)
CHOLEST SERPL-MCNC: 176 MG/DL
CK SERPL-CCNC: 198 U/L (ref 39–308)
CO2 SERPL-SCNC: 30 MMOL/L (ref 21–32)
COLOR UR: NORMAL
CREAT SERPL-MCNC: 0.99 MG/DL (ref 0.7–1.3)
DIFFERENTIAL METHOD BLD: NORMAL
EOSINOPHIL # BLD: 0.1 K/UL (ref 0–0.4)
EOSINOPHIL NFR BLD: 2 % (ref 0–7)
EPITH CASTS URNS QL MICRO: NORMAL /LPF
ERYTHROCYTE [DISTWIDTH] IN BLOOD BY AUTOMATED COUNT: 13.8 % (ref 11.5–14.5)
EST. AVERAGE GLUCOSE BLD GHB EST-MCNC: 134 MG/DL
GLOBULIN SER CALC-MCNC: 3.7 G/DL (ref 2–4)
GLUCOSE SERPL-MCNC: 114 MG/DL (ref 65–100)
GLUCOSE UR STRIP.AUTO-MCNC: NEGATIVE MG/DL
HBA1C MFR BLD: 6.3 % (ref 4–5.6)
HCT VFR BLD AUTO: 46.6 % (ref 36.6–50.3)
HDLC SERPL-MCNC: 36 MG/DL
HDLC SERPL: 4.9 (ref 0–5)
HGB BLD-MCNC: 15.1 G/DL (ref 12.1–17)
HGB UR QL STRIP: NEGATIVE
HIV 1+2 AB+HIV1 P24 AG SERPL QL IA: NONREACTIVE
HIV 1/2 RESULT COMMENT: NORMAL
HYALINE CASTS URNS QL MICRO: NORMAL /LPF (ref 0–5)
IMM GRANULOCYTES # BLD AUTO: 0 K/UL (ref 0–0.04)
IMM GRANULOCYTES NFR BLD AUTO: 0 % (ref 0–0.5)
KETONES UR QL STRIP.AUTO: NEGATIVE MG/DL
LDLC SERPL CALC-MCNC: 106.8 MG/DL (ref 0–100)
LEUKOCYTE ESTERASE UR QL STRIP.AUTO: NEGATIVE
LYMPHOCYTES # BLD: 2.2 K/UL (ref 0.8–3.5)
LYMPHOCYTES NFR BLD: 37 % (ref 12–49)
MCH RBC QN AUTO: 28 PG (ref 26–34)
MCHC RBC AUTO-ENTMCNC: 32.4 G/DL (ref 30–36.5)
MCV RBC AUTO: 86.5 FL (ref 80–99)
MONOCYTES # BLD: 0.3 K/UL (ref 0–1)
MONOCYTES NFR BLD: 5 % (ref 5–13)
NEUTS SEG # BLD: 3.3 K/UL (ref 1.8–8)
NEUTS SEG NFR BLD: 56 % (ref 32–75)
NITRITE UR QL STRIP.AUTO: NEGATIVE
NRBC # BLD: 0 K/UL (ref 0–0.01)
NRBC BLD-RTO: 0 PER 100 WBC
PH UR STRIP: 6 (ref 5–8)
PLATELET # BLD AUTO: 367 K/UL (ref 150–400)
PMV BLD AUTO: 9.7 FL (ref 8.9–12.9)
POTASSIUM SERPL-SCNC: 4.8 MMOL/L (ref 3.5–5.1)
PROT SERPL-MCNC: 7.8 G/DL (ref 6.4–8.2)
PROT UR STRIP-MCNC: NEGATIVE MG/DL
RBC # BLD AUTO: 5.39 M/UL (ref 4.1–5.7)
RBC #/AREA URNS HPF: NORMAL /HPF (ref 0–5)
SODIUM SERPL-SCNC: 137 MMOL/L (ref 136–145)
SP GR UR REFRACTOMETRY: 1.02 (ref 1–1.03)
SPECIMEN HOLD: NORMAL
TRIGL SERPL-MCNC: 166 MG/DL
TSH SERPL DL<=0.05 MIU/L-ACNC: 1.31 UIU/ML (ref 0.36–3.74)
UROBILINOGEN UR QL STRIP.AUTO: 0.2 EU/DL (ref 0.2–1)
VLDLC SERPL CALC-MCNC: 33.2 MG/DL
WBC # BLD AUTO: 5.8 K/UL (ref 4.1–11.1)
WBC URNS QL MICRO: NORMAL /HPF (ref 0–4)

## 2023-12-30 DIAGNOSIS — R73.03 PREDIABETES: ICD-10-CM

## 2023-12-31 RX ORDER — METFORMIN HYDROCHLORIDE 500 MG/1
TABLET, EXTENDED RELEASE ORAL
Qty: 90 TABLET | Refills: 1 | Status: SHIPPED | OUTPATIENT
Start: 2023-12-31

## 2023-12-31 RX ORDER — SIMVASTATIN 10 MG
TABLET ORAL
Qty: 90 TABLET | Refills: 1 | Status: SHIPPED | OUTPATIENT
Start: 2023-12-31

## 2024-02-28 ENCOUNTER — PATIENT MESSAGE (OUTPATIENT)
Age: 49
End: 2024-02-28

## 2024-06-14 ENCOUNTER — TELEPHONE (OUTPATIENT)
Age: 49
End: 2024-06-14

## 2024-06-14 NOTE — TELEPHONE ENCOUNTER
Left message for patient to reschedule appointment on 09/19/2024 with Dr Brambila- provider will be unavailable at this time. Holding spot with NP for testing and annual on 09/20/2024.     Future Appointments   Date Time Provider Department Center   6/25/2024 11:00 AM Elizabeth Rosales MD [facility] BS AMB   9/20/2024  2:00 PM BSDEANNE SUAREZ ECHO 1 FRANCES BROOKS AMB   9/20/2024  2:40 PM PACEMAKER3, ERICK DANIELS BS AMB   9/20/2024  3:00 PM Jud Sanchez, APRN - NP [facility] BROOKS AMB     Sent letter via [employer] as well    [facility]

## 2024-06-23 DIAGNOSIS — E78.5 HYPERLIPIDEMIA LDL GOAL <100: Primary | ICD-10-CM

## 2024-06-23 DIAGNOSIS — R73.03 PREDIABETES: ICD-10-CM

## 2024-06-23 RX ORDER — SIMVASTATIN 10 MG
TABLET ORAL
Qty: 90 TABLET | Refills: 0 | Status: SHIPPED | OUTPATIENT
Start: 2024-06-23

## 2024-06-23 RX ORDER — METFORMIN HYDROCHLORIDE 500 MG/1
TABLET, EXTENDED RELEASE ORAL
Qty: 90 TABLET | Refills: 0 | Status: SHIPPED | OUTPATIENT
Start: 2024-06-23

## 2024-06-25 ENCOUNTER — OFFICE VISIT (OUTPATIENT)
Age: 49
End: 2024-06-25
Payer: COMMERCIAL

## 2024-06-25 VITALS
WEIGHT: 191.8 LBS | HEART RATE: 64 BPM | RESPIRATION RATE: 16 BRPM | BODY MASS INDEX: 29.07 KG/M2 | OXYGEN SATURATION: 96 % | DIASTOLIC BLOOD PRESSURE: 87 MMHG | TEMPERATURE: 97.5 F | SYSTOLIC BLOOD PRESSURE: 128 MMHG | HEIGHT: 68 IN

## 2024-06-25 DIAGNOSIS — R73.03 PREDIABETES: ICD-10-CM

## 2024-06-25 DIAGNOSIS — Z95.810 S/P ICD (INTERNAL CARDIAC DEFIBRILLATOR) PROCEDURE: ICD-10-CM

## 2024-06-25 DIAGNOSIS — I47.19 PAT (PAROXYSMAL ATRIAL TACHYCARDIA) (HCC): ICD-10-CM

## 2024-06-25 DIAGNOSIS — I49.8 BRUGADA SYNDROME: ICD-10-CM

## 2024-06-25 DIAGNOSIS — E78.5 HYPERLIPIDEMIA LDL GOAL <100: Primary | ICD-10-CM

## 2024-06-25 PROCEDURE — 99213 OFFICE O/P EST LOW 20 MIN: CPT | Performed by: FAMILY MEDICINE

## 2024-06-25 ASSESSMENT — PATIENT HEALTH QUESTIONNAIRE - PHQ9
2. FEELING DOWN, DEPRESSED OR HOPELESS: NOT AT ALL
SUM OF ALL RESPONSES TO PHQ QUESTIONS 1-9: 0
SUM OF ALL RESPONSES TO PHQ9 QUESTIONS 1 & 2: 0
SUM OF ALL RESPONSES TO PHQ QUESTIONS 1-9: 0
SUM OF ALL RESPONSES TO PHQ QUESTIONS 1-9: 0
1. LITTLE INTEREST OR PLEASURE IN DOING THINGS: NOT AT ALL
SUM OF ALL RESPONSES TO PHQ QUESTIONS 1-9: 0

## 2024-06-25 ASSESSMENT — ENCOUNTER SYMPTOMS
CONSTIPATION: 0
DIARRHEA: 0
SHORTNESS OF BREATH: 0
WHEEZING: 0
BACK PAIN: 0
COUGH: 1
NAUSEA: 0
ABDOMINAL PAIN: 0
SORE THROAT: 0

## 2024-06-25 NOTE — PROGRESS NOTES
Chief Complaint   Patient presents with    Cholesterol Problem     Follow up    Hyperglycemia     Follow up         \"Have you been to the ER, urgent care clinic since your last visit?  Hospitalized since your last visit?\"    NO    “Have you seen or consulted any other health care providers outside of [facility] since your last visit?”    NO          Click Here for Release of Records Request           6/25/2024    11:05 AM   PHQ-9    Little interest or pleasure in doing things 0   Feeling down, depressed, or hopeless 0   PHQ-2 Score 0   PHQ-9 Total Score 0           Financial Resource Strain: Low Risk  (7/3/2023)    Overall Financial Resource Strain (CARDIA)     Difficulty of Paying Living Expenses: Not hard at all      Food Insecurity: Not on file (7/3/2023)          Health Maintenance Due   Topic Date Due    COVID-19 Vaccine (4 - 2023-24 season) 09/01/2023        
Temp 97.5 °F (36.4 °C) (Temporal)   Resp 16   Ht 1.727 m (5' 8\")   Wt 87 kg (191 lb 12.8 oz)   SpO2 96%   BMI 29.16 kg/m²       Physical Exam  Vitals and nursing note reviewed.   Constitutional:       Appearance: Normal appearance.   HENT:      Head: Normocephalic.      Right Ear: Tympanic membrane normal.      Left Ear: Tympanic membrane normal.      Nose: Nose normal.      Mouth/Throat:      Mouth: Mucous membranes are moist.   Eyes:      Extraocular Movements: Extraocular movements intact.      Pupils: Pupils are equal, round, and reactive to light.   Cardiovascular:      Rate and Rhythm: Normal rate and regular rhythm.      Pulses: Normal pulses.      Heart sounds: Normal heart sounds.   Pulmonary:      Effort: Pulmonary effort is normal. No respiratory distress.      Breath sounds: Normal breath sounds.   Chest:       Abdominal:      General: There is no distension.      Palpations: Abdomen is soft.      Tenderness: There is no abdominal tenderness.   Musculoskeletal:         General: Normal range of motion.      Cervical back: Normal range of motion and neck supple.   Skin:     General: Skin is warm.      Capillary Refill: Capillary refill takes less than 2 seconds.      Findings: No rash.   Neurological:      General: No focal deficit present.      Mental Status: He is alert and oriented to person, place, and time. Mental status is at baseline.   Psychiatric:         Mood and Affect: Mood normal.         Behavior: Behavior normal.         Labs/Imaging/Diagnostics   Labs:  Lab Results   Component Value Date    WBC 5.8 12/21/2023    HGB 15.1 12/21/2023    HCT 46.6 12/21/2023    MCV 86.5 12/21/2023     12/21/2023     Lab Results   Component Value Date     12/21/2023    K 4.8 12/21/2023     12/21/2023    CO2 30 12/21/2023    BUN 16 12/21/2023    CREATININE 0.99 12/21/2023    GLUCOSE 114 (H) 12/21/2023    CALCIUM 9.3 12/21/2023    BILITOT 0.7 12/21/2023    ALKPHOS 66 12/21/2023    AST 27

## 2024-06-27 DIAGNOSIS — E78.5 HYPERLIPIDEMIA LDL GOAL <100: ICD-10-CM

## 2024-06-27 DIAGNOSIS — I49.8 BRUGADA SYNDROME: ICD-10-CM

## 2024-06-27 DIAGNOSIS — Z95.810 S/P ICD (INTERNAL CARDIAC DEFIBRILLATOR) PROCEDURE: ICD-10-CM

## 2024-06-27 DIAGNOSIS — R73.03 PREDIABETES: ICD-10-CM

## 2024-06-27 DIAGNOSIS — I47.19 PAT (PAROXYSMAL ATRIAL TACHYCARDIA) (HCC): ICD-10-CM

## 2024-06-27 LAB
ALBUMIN SERPL-MCNC: 4 G/DL (ref 3.5–5)
ALBUMIN/GLOB SERPL: 1.2 (ref 1.1–2.2)
ALP SERPL-CCNC: 66 U/L (ref 45–117)
ALT SERPL-CCNC: 58 U/L (ref 12–78)
ANION GAP SERPL CALC-SCNC: 4 MMOL/L (ref 5–15)
AST SERPL-CCNC: 30 U/L (ref 15–37)
BILIRUB SERPL-MCNC: 1 MG/DL (ref 0.2–1)
BUN SERPL-MCNC: 13 MG/DL (ref 6–20)
BUN/CREAT SERPL: 12 (ref 12–20)
CALCIUM SERPL-MCNC: 9.7 MG/DL (ref 8.5–10.1)
CHLORIDE SERPL-SCNC: 106 MMOL/L (ref 97–108)
CHOLEST SERPL-MCNC: 161 MG/DL
CO2 SERPL-SCNC: 28 MMOL/L (ref 21–32)
CREAT SERPL-MCNC: 1.11 MG/DL (ref 0.7–1.3)
EST. AVERAGE GLUCOSE BLD GHB EST-MCNC: 134 MG/DL
GLOBULIN SER CALC-MCNC: 3.3 G/DL (ref 2–4)
GLUCOSE SERPL-MCNC: 115 MG/DL (ref 65–100)
HBA1C MFR BLD: 6.3 % (ref 4–5.6)
HDLC SERPL-MCNC: 44 MG/DL
HDLC SERPL: 3.7 (ref 0–5)
LDLC SERPL CALC-MCNC: 91.8 MG/DL (ref 0–100)
POTASSIUM SERPL-SCNC: 4.4 MMOL/L (ref 3.5–5.1)
PROT SERPL-MCNC: 7.3 G/DL (ref 6.4–8.2)
SODIUM SERPL-SCNC: 138 MMOL/L (ref 136–145)
TRIGL SERPL-MCNC: 126 MG/DL
VLDLC SERPL CALC-MCNC: 25.2 MG/DL

## 2024-06-28 RX ORDER — METFORMIN HYDROCHLORIDE 500 MG/1
1000 TABLET, EXTENDED RELEASE ORAL
Qty: 180 TABLET | Refills: 1 | Status: SHIPPED | OUTPATIENT
Start: 2024-06-28

## 2024-08-12 PROCEDURE — 93296 REM INTERROG EVL PM/IDS: CPT | Performed by: INTERNAL MEDICINE

## 2024-08-12 PROCEDURE — 93295 DEV INTERROG REMOTE 1/2/MLT: CPT | Performed by: INTERNAL MEDICINE

## 2024-08-22 RX ORDER — METOPROLOL SUCCINATE 25 MG/1
25 TABLET, EXTENDED RELEASE ORAL DAILY
Qty: 90 TABLET | Refills: 3 | Status: SHIPPED | OUTPATIENT
Start: 2024-08-22

## 2024-08-22 NOTE — TELEPHONE ENCOUNTER
VO per MD    Future Appointments   Date Time Provider Department Center   9/20/2024  2:00 PM BSC SUAREZ ECHO 2 FRANCES BS AMB   10/3/2024  1:00 PM PACEMAKER3, ERICK DANIELS BS AMB   10/3/2024  1:20 PM Romulo Brambila MD [facility]   1/8/2025  9:40 AM Elizabeth Rosales MD [facility] ECC DEP

## 2024-09-18 DIAGNOSIS — E78.5 HYPERLIPIDEMIA LDL GOAL <100: ICD-10-CM

## 2024-09-18 DIAGNOSIS — R73.03 PREDIABETES: ICD-10-CM

## 2024-09-18 RX ORDER — SIMVASTATIN 10 MG
TABLET ORAL
Qty: 90 TABLET | Refills: 0 | OUTPATIENT
Start: 2024-09-18

## 2024-09-18 RX ORDER — METFORMIN HCL 500 MG
TABLET, EXTENDED RELEASE 24 HR ORAL
Qty: 90 TABLET | OUTPATIENT
Start: 2024-09-18

## 2024-09-18 RX ORDER — SIMVASTATIN 10 MG
10 TABLET ORAL NIGHTLY
Qty: 90 TABLET | Refills: 0 | Status: SHIPPED | OUTPATIENT
Start: 2024-09-18

## 2024-09-22 ENCOUNTER — PATIENT MESSAGE (OUTPATIENT)
Age: 49
End: 2024-09-22

## 2024-10-03 ENCOUNTER — PROCEDURE VISIT (OUTPATIENT)
Age: 49
End: 2024-10-03

## 2024-10-03 ENCOUNTER — OFFICE VISIT (OUTPATIENT)
Age: 49
End: 2024-10-03
Payer: COMMERCIAL

## 2024-10-03 VITALS
OXYGEN SATURATION: 99 % | WEIGHT: 198 LBS | HEART RATE: 64 BPM | DIASTOLIC BLOOD PRESSURE: 72 MMHG | SYSTOLIC BLOOD PRESSURE: 130 MMHG | HEIGHT: 68 IN | BODY MASS INDEX: 30.01 KG/M2

## 2024-10-03 DIAGNOSIS — I47.10 SUPRAVENTRICULAR TACHYCARDIA (HCC): ICD-10-CM

## 2024-10-03 DIAGNOSIS — Z01.810 INPLANTABLE CARDIOVERTER-DEFIBRILLATOR IN PLACE, PRE-OPERATIVE CARDIOVASCULAR EXAMINATION: Primary | ICD-10-CM

## 2024-10-03 DIAGNOSIS — I49.8 BRUGADA SYNDROME: ICD-10-CM

## 2024-10-03 DIAGNOSIS — Z95.810 PRESENCE OF AUTOMATIC CARDIOVERTER/DEFIBRILLATOR (AICD): ICD-10-CM

## 2024-10-03 DIAGNOSIS — Z95.810 INPLANTABLE CARDIOVERTER-DEFIBRILLATOR IN PLACE, PRE-OPERATIVE CARDIOVASCULAR EXAMINATION: Primary | ICD-10-CM

## 2024-10-03 PROCEDURE — 99214 OFFICE O/P EST MOD 30 MIN: CPT | Performed by: INTERNAL MEDICINE

## 2024-10-03 ASSESSMENT — PATIENT HEALTH QUESTIONNAIRE - PHQ9
SUM OF ALL RESPONSES TO PHQ QUESTIONS 1-9: 0
SUM OF ALL RESPONSES TO PHQ QUESTIONS 1-9: 0
2. FEELING DOWN, DEPRESSED OR HOPELESS: NOT AT ALL
SUM OF ALL RESPONSES TO PHQ9 QUESTIONS 1 & 2: 0
1. LITTLE INTEREST OR PLEASURE IN DOING THINGS: NOT AT ALL
SUM OF ALL RESPONSES TO PHQ QUESTIONS 1-9: 0
SUM OF ALL RESPONSES TO PHQ QUESTIONS 1-9: 0

## 2024-10-03 NOTE — PATIENT INSTRUCTIONS
Your DEFIBRILLATOR GENERATOR CHANGE procedure has been scheduled for 2/21/25 at 730 AM, at [facility].    Please report to Admitting Department by 615 AM, or 2 hours prior to your scheduled procedure. Please bring a list of your current medications and medication bottles, if able, to the hospital on this day.  You will be unable to drive after your procedure so please make sure to bring someone with you to your procedure.    You will need to have nothing to eat or drink after midnight, the night prior to your procedure. You may have small sips of water, if needed, to take with your medication.    You will also need to see Dr. Brambila's Nurse Practitioner, Jud Sanchez, in office prior to your procedure. An appointment has been scheduled for 1/27/25 at 100 PM.    You will need labs drawn prior to your procedure. Please go to have this done after your appointment with Jud APPLE.    You should NOT stop your medications prior to your scheduled procedure.    After your procedure, you will need to follow up with Dr. Brambila's nurse for a wound and device check. Your follow-up appointment has been scheduled for 2/28/24 at 1100 AM.     Hibiclens 4% topical solution has been ordered and sent into your pharmacy  Patient it start Hibiclens application 5 days prior to procedure date    Directions Hibiclens 4%: Start cleanse 5 days prior to procedure   1. Rinse area (upper chest and upper arms) with water.  2. Apply minimum amount necessary to scrub the upper chest area from shoulder/neck to mid line of chest and to below the nipple each of  5 nights before the day of the procedure  3. Let solution dry.

## 2024-10-03 NOTE — PROGRESS NOTES
[facility]   Cardiac Electrophysiology Clinic Care Note               [x]Established visit     Patient Name: Luis Felipe Kirkpatrick - :1975 - MRN:[id number]   Primary Cardiologist/Electrophysiologist: Romulo Brambila MD     Reason for visit: ICD follow up     HPI:   Mr. Kirkpatrick is a 49 y.o.male who presents for follow up, is s/p Biotronik single lead ICD (DOI 2015) for Brugada syndrome with syncope.   No syncope again for 9 years now    He reports doing well, denies chest pain, palpitations, SOB, PND, orthopnea, syncope, or edema.   He has Apple Watch   Wife is with him today  He has twin daughter 15 yo    LVEF WNL in  via echo.      Then repeated echo 24    ECHO (TTE) COMPLETE (PRN CONTRAST/BUBBLE/STRAIN/3D) 2024  4:52 PM (Final)    Interpretation Summary    Left Ventricle: Normal left ventricular systolic function with a visually estimated EF of 55 - 60%. EF by 2D Simpsons Biplane is 56%. Left ventricle size is normal. Mild posterior thickening. See diagram for wall motion findings. Normal diastolic function.    Mitral Valve: Mild annular calcification.    Signed by: Romulo Brambila MD on 2024  4:52 PM      Previous:   S/p Biotronik single lead ICD 2015 for Brugada syndrome with syncope.       Asymptomatic with PAT.     Thyroid nodules on ultrasound, was referred to endocrinology.        Assessment and Plan      Diagnosis Orders   1. Inplantable cardioverter-defibrillator in place, pre-operative cardiovascular examination        2. Brugada syndrome        3. Supraventricular tachycardia (HCC)          Biotronik single lead ICD (DOI 2015): Device check on 2023 showed proper lead & generator function, 1.4 V @ 0.4 ms pacing threshold.  Generator longevity estimated 13%.  No pacing.  No recent VT/VF.      Sinus rate ok so not adding atrial lead  He agrees with gen change next year    Brugada syndrome: No recent syncope or cardiac arrest  Continue to monitor

## 2024-10-08 ENCOUNTER — TELEPHONE (OUTPATIENT)
Age: 49
End: 2024-10-08

## 2024-10-08 NOTE — TELEPHONE ENCOUNTER
Verified patient with two types of identifiers.  Spoke with patient and advised that Dr Brambila is in Network with his insurance and that authorization for procedure will be obtained prior to procedure. He states that he is going to set money aside in his FSA to cover any out of pocket costs.  Patient verbalized understanding and will call with any other questions.

## 2024-10-08 NOTE — TELEPHONE ENCOUNTER
Patient is calling to make sure that his upcoming surgery is covered by insurance company which is schedule for 2/21/25.    [phone] patient

## 2024-10-10 ENCOUNTER — TELEPHONE (OUTPATIENT)
Age: 49
End: 2024-10-10

## 2024-10-10 NOTE — TELEPHONE ENCOUNTER
Elijah call want to speak with nurse about paperwork and a letter of medical necessities for pt and is want a call back.      BCBN [phone]

## 2024-10-11 ENCOUNTER — TELEPHONE (OUTPATIENT)
Age: 49
End: 2024-10-11

## 2024-10-11 NOTE — TELEPHONE ENCOUNTER
Called back [place] left a VM stated to call back base on couple of questions on paperwork they need and what paper work is for.

## 2024-12-14 DIAGNOSIS — E78.5 HYPERLIPIDEMIA LDL GOAL <100: ICD-10-CM

## 2024-12-14 RX ORDER — SIMVASTATIN 10 MG
10 TABLET ORAL NIGHTLY
Qty: 90 TABLET | Refills: 0 | Status: SHIPPED | OUTPATIENT
Start: 2024-12-14

## 2025-01-08 ENCOUNTER — OFFICE VISIT (OUTPATIENT)
Age: 50
End: 2025-01-08
Payer: COMMERCIAL

## 2025-01-08 VITALS
WEIGHT: 196 LBS | BODY MASS INDEX: 29.7 KG/M2 | TEMPERATURE: 96.8 F | DIASTOLIC BLOOD PRESSURE: 77 MMHG | OXYGEN SATURATION: 99 % | HEART RATE: 60 BPM | SYSTOLIC BLOOD PRESSURE: 111 MMHG | RESPIRATION RATE: 16 BRPM | HEIGHT: 68 IN

## 2025-01-08 DIAGNOSIS — Z95.810 S/P ICD (INTERNAL CARDIAC DEFIBRILLATOR) PROCEDURE: ICD-10-CM

## 2025-01-08 DIAGNOSIS — I49.8 BRUGADA SYNDROME: ICD-10-CM

## 2025-01-08 DIAGNOSIS — Z12.11 SCREENING FOR COLON CANCER: ICD-10-CM

## 2025-01-08 DIAGNOSIS — R74.8 ELEVATED CPK: ICD-10-CM

## 2025-01-08 DIAGNOSIS — Z95.810 PRESENCE OF AUTOMATIC CARDIOVERTER/DEFIBRILLATOR (AICD): ICD-10-CM

## 2025-01-08 DIAGNOSIS — Z00.00 ENCOUNTER FOR PREVENTATIVE ADULT HEALTH CARE EXAMINATION: Primary | ICD-10-CM

## 2025-01-08 DIAGNOSIS — I47.19 PAT (PAROXYSMAL ATRIAL TACHYCARDIA) (HCC): ICD-10-CM

## 2025-01-08 PROBLEM — R55 NEUROCARDIOGENIC SYNCOPE: Status: RESOLVED | Noted: 2019-01-18 | Resolved: 2025-01-08

## 2025-01-08 LAB
ALBUMIN SERPL-MCNC: 3.9 G/DL (ref 3.5–5)
ALBUMIN/GLOB SERPL: 1.1 (ref 1.1–2.2)
ALP SERPL-CCNC: 61 U/L (ref 45–117)
ALT SERPL-CCNC: 49 U/L (ref 12–78)
ANION GAP SERPL CALC-SCNC: 4 MMOL/L (ref 2–12)
APPEARANCE UR: CLEAR
AST SERPL-CCNC: 23 U/L (ref 15–37)
BACTERIA URNS QL MICRO: NEGATIVE /HPF
BASOPHILS # BLD: 0.05 K/UL (ref 0–0.1)
BASOPHILS NFR BLD: 0.7 % (ref 0–1)
BILIRUB SERPL-MCNC: 0.8 MG/DL (ref 0.2–1)
BILIRUB UR QL: NEGATIVE
BUN SERPL-MCNC: 17 MG/DL (ref 6–20)
BUN/CREAT SERPL: 17 (ref 12–20)
CALCIUM SERPL-MCNC: 9.2 MG/DL (ref 8.5–10.1)
CHLORIDE SERPL-SCNC: 104 MMOL/L (ref 97–108)
CHOLEST SERPL-MCNC: 176 MG/DL
CK SERPL-CCNC: 174 U/L (ref 39–308)
CO2 SERPL-SCNC: 27 MMOL/L (ref 21–32)
COLOR UR: NORMAL
CREAT SERPL-MCNC: 1.01 MG/DL (ref 0.7–1.3)
DIFFERENTIAL METHOD BLD: NORMAL
EOSINOPHIL # BLD: 0.14 K/UL (ref 0–0.4)
EOSINOPHIL NFR BLD: 1.9 % (ref 0–7)
EPITH CASTS URNS QL MICRO: NORMAL /LPF
ERYTHROCYTE [DISTWIDTH] IN BLOOD BY AUTOMATED COUNT: 13.8 % (ref 11.5–14.5)
EST. AVERAGE GLUCOSE BLD GHB EST-MCNC: 146 MG/DL
GLOBULIN SER CALC-MCNC: 3.4 G/DL (ref 2–4)
GLUCOSE SERPL-MCNC: 110 MG/DL (ref 65–100)
GLUCOSE UR STRIP.AUTO-MCNC: NEGATIVE MG/DL
HBA1C MFR BLD: 6.7 % (ref 4–5.6)
HCT VFR BLD AUTO: 44.7 % (ref 36.6–50.3)
HDLC SERPL-MCNC: 40 MG/DL
HDLC SERPL: 4.4 (ref 0–5)
HGB BLD-MCNC: 14.2 G/DL (ref 12.1–17)
HGB UR QL STRIP: NEGATIVE
HYALINE CASTS URNS QL MICRO: NORMAL /LPF (ref 0–5)
IMM GRANULOCYTES # BLD AUTO: 0.02 K/UL (ref 0–0.04)
IMM GRANULOCYTES NFR BLD AUTO: 0.3 % (ref 0–0.5)
KETONES UR QL STRIP.AUTO: NEGATIVE MG/DL
LDLC SERPL CALC-MCNC: 97.2 MG/DL (ref 0–100)
LEUKOCYTE ESTERASE UR QL STRIP.AUTO: NEGATIVE
LYMPHOCYTES # BLD: 2.55 K/UL (ref 0.8–3.5)
LYMPHOCYTES NFR BLD: 34 % (ref 12–49)
MCH RBC QN AUTO: 28 PG (ref 26–34)
MCHC RBC AUTO-ENTMCNC: 31.8 G/DL (ref 30–36.5)
MCV RBC AUTO: 88 FL (ref 80–99)
MONOCYTES # BLD: 0.58 K/UL (ref 0–1)
MONOCYTES NFR BLD: 7.7 % (ref 5–13)
NEUTS SEG # BLD: 4.15 K/UL (ref 1.8–8)
NEUTS SEG NFR BLD: 55.4 % (ref 32–75)
NITRITE UR QL STRIP.AUTO: NEGATIVE
NRBC # BLD: 0 K/UL (ref 0–0.01)
NRBC BLD-RTO: 0 PER 100 WBC
PH UR STRIP: 5.5 (ref 5–8)
PLATELET # BLD AUTO: 329 K/UL (ref 150–400)
PMV BLD AUTO: 10.1 FL (ref 8.9–12.9)
POTASSIUM SERPL-SCNC: 4.6 MMOL/L (ref 3.5–5.1)
PROT SERPL-MCNC: 7.3 G/DL (ref 6.4–8.2)
PROT UR STRIP-MCNC: NEGATIVE MG/DL
RBC # BLD AUTO: 5.08 M/UL (ref 4.1–5.7)
RBC #/AREA URNS HPF: NORMAL /HPF (ref 0–5)
SODIUM SERPL-SCNC: 135 MMOL/L (ref 136–145)
SP GR UR REFRACTOMETRY: 1.02 (ref 1–1.03)
TRIGL SERPL-MCNC: 194 MG/DL
TSH SERPL DL<=0.05 MIU/L-ACNC: 1.32 UIU/ML (ref 0.36–3.74)
UROBILINOGEN UR QL STRIP.AUTO: 0.2 EU/DL (ref 0.2–1)
VLDLC SERPL CALC-MCNC: 38.8 MG/DL
WBC # BLD AUTO: 7.5 K/UL (ref 4.1–11.1)
WBC URNS QL MICRO: NORMAL /HPF (ref 0–4)

## 2025-01-08 PROCEDURE — 99396 PREV VISIT EST AGE 40-64: CPT | Performed by: FAMILY MEDICINE

## 2025-01-08 SDOH — ECONOMIC STABILITY: FOOD INSECURITY: WITHIN THE PAST 12 MONTHS, THE FOOD YOU BOUGHT JUST DIDN'T LAST AND YOU DIDN'T HAVE MONEY TO GET MORE.: NEVER TRUE

## 2025-01-08 SDOH — HEALTH STABILITY: PHYSICAL HEALTH: ON AVERAGE, HOW MANY MINUTES DO YOU ENGAGE IN EXERCISE AT THIS LEVEL?: 60 MIN

## 2025-01-08 SDOH — HEALTH STABILITY: PHYSICAL HEALTH: ON AVERAGE, HOW MANY DAYS PER WEEK DO YOU ENGAGE IN MODERATE TO STRENUOUS EXERCISE (LIKE A BRISK WALK)?: 5 DAYS

## 2025-01-08 SDOH — ECONOMIC STABILITY: FOOD INSECURITY: WITHIN THE PAST 12 MONTHS, YOU WORRIED THAT YOUR FOOD WOULD RUN OUT BEFORE YOU GOT MONEY TO BUY MORE.: NEVER TRUE

## 2025-01-08 ASSESSMENT — PATIENT HEALTH QUESTIONNAIRE - PHQ9
SUM OF ALL RESPONSES TO PHQ QUESTIONS 1-9: 0
1. LITTLE INTEREST OR PLEASURE IN DOING THINGS: NOT AT ALL
SUM OF ALL RESPONSES TO PHQ QUESTIONS 1-9: 0
2. FEELING DOWN, DEPRESSED OR HOPELESS: NOT AT ALL
SUM OF ALL RESPONSES TO PHQ9 QUESTIONS 1 & 2: 0
SUM OF ALL RESPONSES TO PHQ QUESTIONS 1-9: 0
SUM OF ALL RESPONSES TO PHQ QUESTIONS 1-9: 0

## 2025-01-08 ASSESSMENT — ENCOUNTER SYMPTOMS
DIARRHEA: 0
ABDOMINAL PAIN: 0
WHEEZING: 0
COUGH: 0
CONSTIPATION: 0
SHORTNESS OF BREATH: 0
SORE THROAT: 0
NAUSEA: 0
BACK PAIN: 0

## 2025-01-08 NOTE — ASSESSMENT & PLAN NOTE
Monitored by specialist- no acute findings meriting change in the plan  Follows Dr. Brambila.  S/p ICD for Brugada syndrome.  Stable on current metoprolol

## 2025-01-08 NOTE — PROGRESS NOTES
Chief Complaint   Patient presents with    Annual Exam     Follow up         \"Have you been to the ER, urgent care clinic since your last visit?  Hospitalized since your last visit?\"    NO    “Have you seen or consulted any other health care providers outside of [facility] since your last visit?”    NO      “Have you had a colorectal cancer screening such as a colonoscopy/FIT/Cologuard?    YES - Type: Cologuard     No colonoscopy on file  Date of last Cologuard: 12/15/2021  No FIT/FOBT on file   No flexible sigmoidoscopy on file         Click Here for Release of Records Request           1/8/2025     9:39 AM   PHQ-9    Little interest or pleasure in doing things 0   Feeling down, depressed, or hopeless 0   PHQ-2 Score 0   PHQ-9 Total Score 0           Financial Resource Strain: Low Risk  (7/3/2023)    Overall Financial Resource Strain (CARDIA)     Difficulty of Paying Living Expenses: Not hard at all      Food Insecurity: No Food Insecurity (1/8/2025)    Hunger Vital Sign     Worried About Running Out of Food in the Last Year: Never true     Ran Out of Food in the Last Year: Never true          Health Maintenance Due   Topic Date Due    Hepatitis B vaccine (1 of 3 - 19+ 3-dose series) Never done    Flu vaccine (1) 08/01/2024    COVID-19 Vaccine (4 - 2023-24 season) 09/01/2024    Colorectal Cancer Screen  12/15/2024        
software.  Quite often unanticipated grammatical, syntax, homophones, and other interpretive errors are inadvertently transcribed by the computer software.  Please disregard these errors.  Please excuse any errors that have escaped final proofreading.  Thank you.     Return in about 6 months (around 7/8/2025) for follow up, fasting.     Electronically signed by Elizabeth Rosales MD on 1/8/25 at 12:45 PM EST

## 2025-01-09 NOTE — RESULT ENCOUNTER NOTE
Rainer Briscoe,  I have reviewed your results.  Not happy with your sugar this time.  Your average sugar is in diabetic range and fasting sugar still stays high.  This time your triglyceride is also on the higher side likely from high sugar.  I am not sure whether it is related to holidays but watch her diet strictly for carbohydrate as we discussed.  Making sure you are taking 2 tablets of metformin daily.  I would recommend to recheck your sugar in 3 months and if it still stays in diabetic range then we need to consider aggressive approach due to your underlying heart condition.  For now, more importantly his diet and exercise and medication compliance.  Other results including blood count, kidney and liver function, thyroid function, urine analysis, CK level are in normal range.  Let me know if any questions, thanks.

## 2025-01-16 LAB — NONINV COLON CA DNA+OCC BLD SCRN STL QL: NORMAL

## 2025-01-20 NOTE — PROGRESS NOTES
checks q 3 months.  Follow up in EP clinic as noted below.    Future Appointments   Date Time Provider Department Center   2/28/2025 11:00 AM PACEMAKER3, ERICK DANIELS BS AMB   7/8/2025 10:00 AM Elizabeth Rosales MD [facility]   10/15/2025  1:20 PM PACEMAKER3, ERICK DANIELS BS AMB   10/15/2025  1:40 PM Juni Meehan APRN - NP FRANCES  AMB         ____________________________________________________________    Cardiac testing    09/20/24    ECHO (TTE) COMPLETE (PRN CONTRAST/BUBBLE/STRAIN/3D) 09/22/2024  4:52 PM (Final)    Interpretation Summary    Left Ventricle: Normal left ventricular systolic function with a visually estimated EF of 55 - 60%. EF by 2D Simpsons Biplane is 56%. Left ventricle size is normal. Mild posterior thickening. See diagram for wall motion findings. Normal diastolic function.    Mitral Valve: Mild annular calcification.    Signed by: Romulo Brambila MD on 9/22/2024  4:52 PM        No results found for this or any previous visit.         No results found for this or any previous visit.           Review of Systems    [x]All other systems reviewed and all negative except as written in HPI    [] Patient unable to provide secondary to condition              Patient Active Problem List   Diagnosis    PAT (paroxysmal atrial tachycardia) (HCC)    Hyperlipidemia LDL goal <100    Elevated CPK    Prediabetes    Over weight    S/P ICD (internal cardiac defibrillator) procedure    Brugada syndrome    Presence of automatic cardioverter/defibrillator (AICD)       Past Medical History:   Diagnosis Date    Brugada syndrome     Hypercholesteremia     Impaired glucose tolerance     Nodule of left lobe of thyroid gland        Past Surgical History:   Procedure Laterality Date    INS PPM/ICD LED SING ONLY  2/2/2015            No Known Allergies    Social History     Tobacco Use    Smoking status: Never    Smokeless tobacco: Never   Substance Use Topics    Alcohol use: No    Drug use: No       Family

## 2025-01-20 NOTE — H&P (VIEW-ONLY)
[facility]  Cardiac Electrophysiology Clinic Care Note                  []Initial visit     [x]Established visit     Patient Name: Luis Felipe Kirkpatrick - :1975 - MRN:[id number]  Primary Cardiologist: None  Electrophysiologist: Romulo Brambila MD     Reason for visit: H&P update    HPI:  Mr. Kirkpatrick is a 49 y.o. male who presents for H&P update prior to upcoming planned generator change of his Biotronik single lead ICD (DOI 2015), scheduled for 2025.    He reports doing well.  Denies chest pain, SOB, palpitations, or recent syncope.  Uses Apple Watch for rhythm monitoring.    Echo in 2024 showed LVEF 55-60% with mild posterior thickening, mild MAC.    BP controlled.      Previous:  S/p Biotronik single lead ICD 2015 for Brugada syndrome with syncope.       Asymptomatic with PAT.     Thyroid nodules on ultrasound, was referred to endocrinology.     Twin daughters, age 15.       Assessment and Plan       ICD-10-CM    1. ICD (implantable cardioverter-defibrillator) in place  Z95.810 CBC     Basic Metabolic Panel      2. Brugada syndrome  I49.8 CBC     Basic Metabolic Panel      3. PAT (paroxysmal atrial tachycardia) (HCC)  I47.19 CBC     Basic Metabolic Panel          Biotronik single lead ICD (DOI 2015): Device check on 2024 showed proper lead & generator function.  Generator capacity estimated 11%.  No pacing.    Reviewed risks/benefits of ICD generator change.  Shared decision making was utilized between the physician/provider and patient/family in regards to ICD generator change and therapy.  He agrees to proceed as scheduled.  Labs ordered.    Brugada syndrome: No recent syncope or cardiac arrest.  Will continue to monitor VT/VF via ICD.  Condition is usually less lethal in women, but daughters will need eval when they're older.    PAT: Asymptomatic, noted via device checks.  No medication indicated for control at this time.       Remote ICD

## 2025-01-27 ENCOUNTER — OFFICE VISIT (OUTPATIENT)
Age: 50
End: 2025-01-27
Payer: COMMERCIAL

## 2025-01-27 VITALS
DIASTOLIC BLOOD PRESSURE: 80 MMHG | WEIGHT: 190 LBS | OXYGEN SATURATION: 98 % | SYSTOLIC BLOOD PRESSURE: 132 MMHG | HEART RATE: 64 BPM | BODY MASS INDEX: 28.89 KG/M2

## 2025-01-27 DIAGNOSIS — I47.19 PAT (PAROXYSMAL ATRIAL TACHYCARDIA) (HCC): ICD-10-CM

## 2025-01-27 DIAGNOSIS — I49.8 BRUGADA SYNDROME: ICD-10-CM

## 2025-01-27 DIAGNOSIS — Z95.810 ICD (IMPLANTABLE CARDIOVERTER-DEFIBRILLATOR) IN PLACE: Primary | ICD-10-CM

## 2025-01-27 DIAGNOSIS — Z95.810 ICD (IMPLANTABLE CARDIOVERTER-DEFIBRILLATOR) IN PLACE: ICD-10-CM

## 2025-01-27 LAB
ANION GAP SERPL CALC-SCNC: 6 MMOL/L (ref 2–12)
BUN SERPL-MCNC: 18 MG/DL (ref 6–20)
BUN/CREAT SERPL: 16 (ref 12–20)
CALCIUM SERPL-MCNC: 9.6 MG/DL (ref 8.5–10.1)
CHLORIDE SERPL-SCNC: 106 MMOL/L (ref 97–108)
CO2 SERPL-SCNC: 28 MMOL/L (ref 21–32)
CREAT SERPL-MCNC: 1.13 MG/DL (ref 0.7–1.3)
ERYTHROCYTE [DISTWIDTH] IN BLOOD BY AUTOMATED COUNT: 13.7 % (ref 11.5–14.5)
GLUCOSE SERPL-MCNC: 145 MG/DL (ref 65–100)
HCT VFR BLD AUTO: 41.2 % (ref 36.6–50.3)
HGB BLD-MCNC: 13.4 G/DL (ref 12.1–17)
MCH RBC QN AUTO: 28.5 PG (ref 26–34)
MCHC RBC AUTO-ENTMCNC: 32.5 G/DL (ref 30–36.5)
MCV RBC AUTO: 87.5 FL (ref 80–99)
NRBC # BLD: 0 K/UL (ref 0–0.01)
NRBC BLD-RTO: 0 PER 100 WBC
PLATELET # BLD AUTO: 315 K/UL (ref 150–400)
PMV BLD AUTO: 9.9 FL (ref 8.9–12.9)
POTASSIUM SERPL-SCNC: 4.8 MMOL/L (ref 3.5–5.1)
RBC # BLD AUTO: 4.71 M/UL (ref 4.1–5.7)
SODIUM SERPL-SCNC: 140 MMOL/L (ref 136–145)
WBC # BLD AUTO: 5.8 K/UL (ref 4.1–11.1)

## 2025-01-27 PROCEDURE — 99214 OFFICE O/P EST MOD 30 MIN: CPT | Performed by: NURSE PRACTITIONER

## 2025-01-27 RX ORDER — CHLORHEXIDINE GLUCONATE 40 MG/ML
SOLUTION TOPICAL DAILY PRN
Qty: 1 EACH | Refills: 0 | Status: SHIPPED | OUTPATIENT
Start: 2025-01-27

## 2025-01-27 NOTE — PATIENT INSTRUCTIONS
Your DEFIBRILLATOR GENERATOR CHANGE procedure has been scheduled for 2/21/25 at 730 AM, at [facility].     Please report to Admitting Department by 615 AM, or 2 hours prior to your scheduled procedure. Please bring a list of your current medications and medication bottles, if able, to the hospital on this day.  You will be unable to drive after your procedure so please make sure to bring someone with you to your procedure.     You will need to have nothing to eat or drink after midnight, the night prior to your procedure. You may have small sips of water, if needed, to take with your medication.      You will need labs drawn prior to your procedure. Please go to have this done after your appointment with Jud APPLE.     You should NOT stop your medications prior to your scheduled procedure.     After your procedure, you will need to follow up with Dr. Brambila's nurse for a wound and device check. Your follow-up appointment has been scheduled for 2/28/24 at 1100 AM.      Hibiclens 4% topical solution has been ordered and sent into your pharmacy  Patient it start Hibiclens application 5 days prior to procedure date     Directions Hibiclens 4%: Start cleanse 5 days prior to procedure   1. Rinse area (upper chest and upper arms) with water.  2. Apply minimum amount necessary to scrub the upper chest area from shoulder/neck to mid line of chest and to below the nipple each of  5 nights before the day of the procedure  3. Let solution dry.

## 2025-02-01 LAB — NONINV COLON CA DNA+OCC BLD SCRN STL QL: NEGATIVE

## 2025-02-12 ENCOUNTER — PREP FOR PROCEDURE (OUTPATIENT)
Age: 50
End: 2025-02-12

## 2025-02-12 RX ORDER — SODIUM CHLORIDE 9 MG/ML
INJECTION, SOLUTION INTRAVENOUS PRN
Status: CANCELLED | OUTPATIENT
Start: 2025-02-12

## 2025-02-12 RX ORDER — SODIUM CHLORIDE 0.9 % (FLUSH) 0.9 %
5-40 SYRINGE (ML) INJECTION EVERY 12 HOURS SCHEDULED
Status: CANCELLED | OUTPATIENT
Start: 2025-02-12

## 2025-02-12 RX ORDER — SODIUM CHLORIDE 0.9 % (FLUSH) 0.9 %
5-40 SYRINGE (ML) INJECTION PRN
Status: CANCELLED | OUTPATIENT
Start: 2025-02-12

## 2025-02-15 ENCOUNTER — PATIENT MESSAGE (OUTPATIENT)
Age: 50
End: 2025-02-15

## 2025-02-20 NOTE — DISCHARGE INSTRUCTIONS
Patient Instructions Post-ICD Implant    Resume aspirin on 2/23/2025  Start keflex antibiotic when you have it  Inside defibrillator pocket there is also antibiotic but keep wound dry for first week, may wash around with shower head    Return to your normal activity, except: NO driving today       3.  Your Aquacel chest dressing will be removed in clinic at follow up.      4.  Do not rub or massage your ICD site.    5.  Do not drive today    6.  Call Dr. Brambila at [phone] if you experience any of the following symptoms:  Redness at the ICD site  Swelling at or around the ICD or in the left arm  Pain around the ICD  Dizziness, lightheadedness, fainting spells  Lack of energy  Shortness of breath  Rapid heart rate  Chest or muscle twitches    7.  Call Dr. Brambila at [phone] if your ICD delivers a shock.  The physician may   or may not want to see you in the office (that decision will be made when you call).    8.  Follow-up with Dr. Brambila's office for wound check on 2/28/2025 at 11:00 am.    Future Appointments   Date Time Provider Department Center   2/28/2025 11:00 AM PACEMAKER3, ERICK BROOKS AMB   7/8/2025 10:00 AM Elizabeth Rosales MD PAFP [facility] DEP   10/15/2025  1:20 PM PACEMAKER3, ERICK BROOKS AMB   10/15/2025  1:40 PM Juni Meehan APRN - NP FRANCES BROOKS AMB         Romulo Brambila M.D. [facility]  Electrophysiology/Cardiology  [facility]  [address]                      [address] [address]  [place] [address]                             [facility] [address] [phone]

## 2025-02-21 ENCOUNTER — HOSPITAL ENCOUNTER (OUTPATIENT)
Facility: HOSPITAL | Age: 50
Discharge: HOME | End: 2025-02-21
Attending: INTERNAL MEDICINE | Admitting: INTERNAL MEDICINE
Payer: COMMERCIAL

## 2025-02-21 VITALS
TEMPERATURE: 98.1 F | DIASTOLIC BLOOD PRESSURE: 86 MMHG | RESPIRATION RATE: 12 BRPM | SYSTOLIC BLOOD PRESSURE: 125 MMHG | WEIGHT: 185 LBS | HEART RATE: 72 BPM | BODY MASS INDEX: 26.48 KG/M2 | OXYGEN SATURATION: 100 % | HEIGHT: 70 IN

## 2025-02-21 DIAGNOSIS — Z95.810 PRESENCE OF AUTOMATIC CARDIOVERTER/DEFIBRILLATOR (AICD): ICD-10-CM

## 2025-02-21 LAB — ECHO BSA: 2.04 M2

## 2025-02-21 PROCEDURE — C1889 IMPLANT/INSERT DEVICE, NOC: HCPCS | Performed by: INTERNAL MEDICINE

## 2025-02-21 PROCEDURE — 33262 RMVL& REPLC PULSE GEN 1 LEAD: CPT | Performed by: INTERNAL MEDICINE

## 2025-02-21 PROCEDURE — 2720000010 HC SURG SUPPLY STERILE: Performed by: INTERNAL MEDICINE

## 2025-02-21 PROCEDURE — 99152 MOD SED SAME PHYS/QHP 5/>YRS: CPT | Performed by: INTERNAL MEDICINE

## 2025-02-21 PROCEDURE — 6360000002 HC RX W HCPCS: Performed by: INTERNAL MEDICINE

## 2025-02-21 PROCEDURE — 99153 MOD SED SAME PHYS/QHP EA: CPT | Performed by: INTERNAL MEDICINE

## 2025-02-21 PROCEDURE — C1722 AICD, SINGLE CHAMBER: HCPCS | Performed by: INTERNAL MEDICINE

## 2025-02-21 PROCEDURE — 2709999900 HC NON-CHARGEABLE SUPPLY: Performed by: INTERNAL MEDICINE

## 2025-02-21 DEVICE — ENVELOPE CMRM6133 ABSORB LRG MR
Type: IMPLANTABLE DEVICE | Status: FUNCTIONAL
Brand: TYRX™

## 2025-02-21 DEVICE — IMPLANTABLE DEVICE
Type: IMPLANTABLE DEVICE | Status: FUNCTIONAL
Brand: INTICA NEO 7 VR-T DX DF-1 PROMRI

## 2025-02-21 RX ORDER — CEFAZOLIN SODIUM 1 G/3ML
INJECTION, POWDER, FOR SOLUTION INTRAMUSCULAR; INTRAVENOUS PRN
Status: DISCONTINUED | OUTPATIENT
Start: 2025-02-21 | End: 2025-02-21 | Stop reason: HOSPADM

## 2025-02-21 RX ORDER — ACETAMINOPHEN 325 MG/1
650 TABLET ORAL EVERY 4 HOURS PRN
Status: DISCONTINUED | OUTPATIENT
Start: 2025-02-21 | End: 2025-02-21 | Stop reason: HOSPADM

## 2025-02-21 RX ORDER — SODIUM CHLORIDE 9 MG/ML
INJECTION, SOLUTION INTRAVENOUS PRN
Status: DISCONTINUED | OUTPATIENT
Start: 2025-02-21 | End: 2025-02-21 | Stop reason: HOSPADM

## 2025-02-21 RX ORDER — SODIUM CHLORIDE 0.9 % (FLUSH) 0.9 %
5-40 SYRINGE (ML) INJECTION EVERY 12 HOURS SCHEDULED
Status: DISCONTINUED | OUTPATIENT
Start: 2025-02-21 | End: 2025-02-21 | Stop reason: HOSPADM

## 2025-02-21 RX ORDER — SODIUM CHLORIDE 0.9 % (FLUSH) 0.9 %
5-40 SYRINGE (ML) INJECTION PRN
Status: DISCONTINUED | OUTPATIENT
Start: 2025-02-21 | End: 2025-02-21 | Stop reason: HOSPADM

## 2025-02-21 RX ORDER — ONDANSETRON 2 MG/ML
4 INJECTION INTRAMUSCULAR; INTRAVENOUS EVERY 6 HOURS PRN
Status: DISCONTINUED | OUTPATIENT
Start: 2025-02-21 | End: 2025-02-21 | Stop reason: HOSPADM

## 2025-02-21 RX ORDER — MIDAZOLAM HYDROCHLORIDE 1 MG/ML
INJECTION, SOLUTION INTRAMUSCULAR; INTRAVENOUS PRN
Status: DISCONTINUED | OUTPATIENT
Start: 2025-02-21 | End: 2025-02-21 | Stop reason: HOSPADM

## 2025-02-21 RX ORDER — FENTANYL CITRATE 50 UG/ML
INJECTION, SOLUTION INTRAMUSCULAR; INTRAVENOUS PRN
Status: DISCONTINUED | OUTPATIENT
Start: 2025-02-21 | End: 2025-02-21 | Stop reason: HOSPADM

## 2025-02-21 RX ORDER — CEPHALEXIN 500 MG/1
500 CAPSULE ORAL 3 TIMES DAILY
Qty: 15 CAPSULE | Refills: 0 | Status: SHIPPED | OUTPATIENT
Start: 2025-02-21

## 2025-02-21 RX ORDER — LIDOCAINE HYDROCHLORIDE AND EPINEPHRINE 10; 10 MG/ML; UG/ML
INJECTION, SOLUTION INFILTRATION; PERINEURAL PRN
Status: DISCONTINUED | OUTPATIENT
Start: 2025-02-21 | End: 2025-02-21 | Stop reason: HOSPADM

## 2025-02-21 NOTE — PROGRESS NOTES
Cardiac Cath Lab Recovery Arrival Note:      Luis Felipe Kirkpatrick arrived to Cardiac Cath Lab, Recovery Area. Staff introduced to patient. Patient identifiers verified with NAME and DATE OF BIRTH. Procedure verified with patient. Consent forms reviewed and signed by patient or authorized representative and verified. Allergies verified.     Patient and family oriented to department. Patient and family informed of procedure and plan of care.     Questions answered with review. Patient prepped for procedure, per orders from physician, prior to arrival.    Patient on cardiac monitor, non-invasive blood pressure, SPO2 monitor. On room air. Patient is A&Ox 4. Patient reports no pain.     Patient in stretcher, in low position, with side rails up, call bell within reach, patient instructed to call if assistance as needed.    Patient prep in: [facility] Recovery Area, [place] FAST TRACK 1.   Patient family has pager # [phone]  Family in: [phone].   Prep by: JAEL and KARL

## 2025-02-21 NOTE — INTERVAL H&P NOTE
Update History & Physical    The patient's History and Physical of January 27, 2025 was reviewed with the patient and I examined the patient. There was no change. The surgical site was confirmed by the patient and me.     Plan: The risks, benefits, expected outcome, and alternative to the recommended procedure have been discussed with the patient. Patient understands and wants to proceed with the procedure.     Electronically signed by Romulo Brambila MD on 2/21/2025 at 7:00 AM

## 2025-02-21 NOTE — PROGRESS NOTES
Ambulated patient to the bathroom with a steady gait with standby assist, voided without difficulty. Returned to stretcher. Vital signs stable.     Site is clean, dry, and intact. No bleeding, no hematoma.     Assisted patient in dressing/Patient dressed self.   Educated patient about their sedation precautions such as not driving, operating any machinery, or signing legal documents 24 hours post procedure.     Reviewed discharge instructions, including medications, and site care using the teach back method. Answered all questions. Verbalized understanding.     Removed peripheral IV    Escorted to discharge area in a wheelchair with all of their belongings including clothing, valuables and ICD box.    Spouse present to take patient home. Reviewed discharge instructions with spouse. Verbalized understanding.

## 2025-02-21 NOTE — PROCEDURES
PROCEDURE NOTE  Date: 2/21/2025   Name: Luis Felipe Kirkpatrick  YOB: 1975    Procedures    Cardiac Procedure Note   Patient: Luis Felipe Kirkpatrick  MRN: [id number]  SSN: [id number]   YOB: 1975 Age: 49 y.o.  Sex: male    Date of Procedure: 2/21/2025   Pre-procedure Diagnosis: Brugada syndrome, single-chamber AICD YANDEL  Post-procedure Diagnosis: The same  Procedure: AICD replacement  :  Dr. Romulo Brambila MD    Assistant(s):  None  Anesthesia: Moderate Sedation   Estimated Blood Loss: Less than 10 mL   Specimens Removed: Previous Biotronik AICD  Findings: Normal sinus rhythm  Complications: None   Implants: Single-chamber Biotronik AICD  Signed by:  Romulo Brambila MD  2/21/2025  9:15 AM

## 2025-02-21 NOTE — PROGRESS NOTES
TRANSFER - IN Cath Lab RR REPORT:    Verbal report received from Delores on Luis Felipe Kirkpatrick  being received from the EP Lab for routine progression of care. Report consisted of patient’s Situation, Background, Assessment and Recommendations(SBAR). Procedural summary and MAR reviewed with receiving nurse. Opportunity for questions and clarification was provided. Assessment completed upon patient’s arrival to Cardiac Cath Lab RECOVERY AREA and care assumed.       Cardiac Cath Lab Recovery Arrival Note:    Luis Felipe Kirkpatrick arrived to CCL recovery area.  Patient procedure= Gen Change. Patient on cardiac monitor, non-invasive blood pressure, SPO2 monitor. On RA. Patient is A&Ox 4. Patient reports no complaints.    PROCEDURE SITE CHECK:    Procedure site: No bleeding and no hematoma, no pain/discomfort reported at procedure site.     No change in patient status. Continue to monitor patient and status.

## 2025-02-21 NOTE — PROGRESS NOTES
EP/Cath LAB to Recovery Room Report    Procedure: Single chamber ICD    MD: TARAN Brambila MD    Verbal Report given to Recovery Nurse on patient being transferred to Recovery Room for routine post-op. Patient stable upon transfer to .  Pt had IV conscious sedation with procedural RN. Vitals, mental status, MAR, procedural summary discussed with recovery RN.     Conscious sedation medication given by Procedural RN:  Versed 5 mg  Fentanyl 100mcg      Procedural Site:    Incision site  left chest.           ICD Biotronic VDI 50  1St shock 188bpm

## 2025-02-28 ENCOUNTER — PROCEDURE VISIT (OUTPATIENT)
Age: 50
End: 2025-02-28

## 2025-02-28 DIAGNOSIS — Z95.810 ICD (IMPLANTABLE CARDIOVERTER-DEFIBRILLATOR) IN PLACE: Primary | ICD-10-CM

## 2025-02-28 NOTE — PROGRESS NOTES
Patient presents for wound check post-device implantation. The dressing was removed and the site was inspected. Steri strips remain intact The site appeared to be well-healing without ecchymosis or tenderness. Mild swelling noted at site-pt encouraged to ice and take tylenol as needed. Denies pain, fevers, discharge.           Future Appointments   Date Time Provider Department Center   5/30/2025 11:00 AM [email or web address], [facility] AM[facility]   5/30/2025 11:20 AM Juni Meehan APRN - [facility]   7/8/2025 10:00 [facility]   7/7/2026 11:20 AM [email or web address], [facility]   7/7/2026 11:40 AM Romulo Brambila MD [facility] AM[facility]         Continue follow up in device clinic as planned.

## 2025-03-08 DIAGNOSIS — E78.5 HYPERLIPIDEMIA LDL GOAL <100: ICD-10-CM

## 2025-03-08 RX ORDER — SIMVASTATIN 10 MG
10 TABLET ORAL NIGHTLY
Qty: 90 TABLET | Refills: 0 | Status: SHIPPED | OUTPATIENT
Start: 2025-03-08

## 2025-03-10 DIAGNOSIS — R73.03 PREDIABETES: ICD-10-CM

## 2025-03-11 RX ORDER — METFORMIN HYDROCHLORIDE 500 MG/1
1000 TABLET, EXTENDED RELEASE ORAL
Qty: 180 TABLET | Refills: 1 | Status: SHIPPED | OUTPATIENT
Start: 2025-03-11

## 2025-03-11 NOTE — TELEPHONE ENCOUNTER
PCP: Elizabeth Rosales MD    Last appt: 1/8/2025   Future Appointments   Date Time Provider Department Center   5/30/2025 11:00 AM [email or web address], [facility]   5/30/2025 11:20 AM Juni Meehan APRN - NP [facility]   7/8/2025 10:00 AM Elizabeth Rosales MD [facility]   7/7/2026 11:20 AM [email or web address], [facility]   7/7/2026 11:40 AM Romulo Brambila MD [facility]  AMB       Requested Prescriptions     Pending Prescriptions Disp Refills    metFORMIN (GLUCOPHAGE-XR) 500 MG extended release tablet [Pharmacy Med Name: METFORMIN HCL  MG TABLET] 180 tablet 1     Sig: TAKE 2 TABLETS BY MOUTH DAILY (WITH BREAKFAST).

## 2025-05-30 ENCOUNTER — OFFICE VISIT (OUTPATIENT)
Age: 50
End: 2025-05-30
Payer: COMMERCIAL

## 2025-05-30 ENCOUNTER — PROCEDURE VISIT (OUTPATIENT)
Age: 50
End: 2025-05-30

## 2025-05-30 VITALS
HEART RATE: 84 BPM | BODY MASS INDEX: 26.97 KG/M2 | HEIGHT: 70 IN | OXYGEN SATURATION: 96 % | SYSTOLIC BLOOD PRESSURE: 108 MMHG | WEIGHT: 188.4 LBS | DIASTOLIC BLOOD PRESSURE: 82 MMHG

## 2025-05-30 DIAGNOSIS — I49.8 BRUGADA PATTERN ON ELECTROCARDIOGRAM: ICD-10-CM

## 2025-05-30 DIAGNOSIS — Z95.810 ICD (IMPLANTABLE CARDIOVERTER-DEFIBRILLATOR) IN PLACE: Primary | ICD-10-CM

## 2025-05-30 DIAGNOSIS — I47.19 PAT (PAROXYSMAL ATRIAL TACHYCARDIA): ICD-10-CM

## 2025-05-30 PROCEDURE — 99214 OFFICE O/P EST MOD 30 MIN: CPT | Performed by: NURSE PRACTITIONER

## 2025-05-30 ASSESSMENT — PATIENT HEALTH QUESTIONNAIRE - PHQ9
1. LITTLE INTEREST OR PLEASURE IN DOING THINGS: NOT AT ALL
SUM OF ALL RESPONSES TO PHQ QUESTIONS 1-9: 0
2. FEELING DOWN, DEPRESSED OR HOPELESS: NOT AT ALL
SUM OF ALL RESPONSES TO PHQ QUESTIONS 1-9: 0

## 2025-05-30 NOTE — PROGRESS NOTES
1. Have you been to the ER, urgent care clinic since your last visit?  Hospitalized since your last visit?No    2. Have you seen or consulted any other health care providers outside of the [facility] since your last visit?  Include any pap smears or colon screening. No    
      Family History   Problem Relation Age of Onset    Diabetes Father     Hypertension Father     Elevated Lipids Father     Heart Disease Father     Elevated Lipids Brother     Hypertension Brother     Diabetes Mother     Hypertension Mother            OBJECTIVE:    Physical Exam    Vitals:   Vitals:    05/30/25 1057   BP: 108/82   BP Site: Left Upper Arm   Patient Position: Sitting   BP Cuff Size: Medium Adult   Pulse: 84   SpO2: 96%   Weight: 85.5 kg (188 lb 6.4 oz)   Height: 1.778 m (5' 10\")       General:    Alert, cooperative, no distress, appears stated age.   Neck:   Supple, no JVD.   Back:     Symmetric.   Lungs:     Clear to auscultation bilaterally.   Heart:    Regular rate and rhythm.  No murmur, click, rub or gallop.   Abdomen:     Soft, non-tender. Bowel sounds normal.    MSK:   Extremities normal, atraumatic.  Moves extremities independently.   Vasc/lymph:   No lower extremity edema.   Skin:   Skin color normal. No rashes or lesions on visible areas.  Left chest ICD site well healed; no erythema, tenderness, or areas of thinning apparent.   Neurologic:   Alert, moves all extremities.        Data Review:     Radiology:   XR Results (most recent):    CT Result (most recent):  No results found for this or any previous visit from the past 3650 days.    MRI Result (most recent):  No results found for this or any previous visit from the past 3650 days.          Current meds:  Current Outpatient Medications   Medication Sig Dispense Refill    metFORMIN (GLUCOPHAGE-XR) 500 MG extended release tablet TAKE 2 TABLETS BY MOUTH DAILY (WITH BREAKFAST). 180 tablet 1    simvastatin (ZOCOR) 10 MG tablet TAKE 1 TABLET BY MOUTH EVERY DAY AT NIGHT 90 tablet 0    cephALEXin (KEFLEX) 500 MG capsule Take 1 capsule by mouth 3 times daily 15 capsule 0    Vitamin D3 125 MCG (5000 UT) TABS tablet Take 1 tablet by mouth daily      metoprolol succinate (TOPROL XL) 25 MG extended release tablet TAKE 1 TABLET BY MOUTH EVERY DAY

## 2025-06-04 DIAGNOSIS — E78.5 HYPERLIPIDEMIA LDL GOAL <100: ICD-10-CM

## 2025-06-04 RX ORDER — SIMVASTATIN 10 MG
10 TABLET ORAL NIGHTLY
Qty: 90 TABLET | Refills: 0 | Status: SHIPPED | OUTPATIENT
Start: 2025-06-04

## 2025-06-04 NOTE — TELEPHONE ENCOUNTER
PCP: Elizabeth Rosales MD    Last appt: 1/8/2025   Future Appointments   Date Time Provider Department Center   7/8/2025 10:00 AM Elizabeth Rosales MD [facility] DEP       Requested Prescriptions     Pending Prescriptions Disp Refills    simvastatin (ZOCOR) 10 MG tablet [Pharmacy Med Name: SIMVASTATIN 10 MG TABLET] 90 tablet 0     Sig: TAKE 1 TABLET BY MOUTH EVERY DAY AT NIGHT         Prior labs and Blood pressures:  BP Readings from Last 3 Encounters:   05/30/25 108/82   02/21/25 125/86   01/27/25 132/80     Lab Results   Component Value Date/Time     01/27/2025 01:50 PM    K 4.8 01/27/2025 01:50 PM     01/27/2025 01:50 PM    CO2 28 01/27/2025 01:50 PM    BUN 18 01/27/2025 01:50 PM    GFRAA >60 09/07/2022 10:38 AM     No results found for: \"HBA1C\", \"[id number]\"  Lab Results   Component Value Date/Time    CHOL 176 01/08/2025 10:16 AM    HDL 40 01/08/2025 10:16 AM    LDL 97.2 01/08/2025 10:16 AM    .8 12/21/2023 09:34 AM    VLDL 38.8 01/08/2025 10:16 AM     No results found for: \"VITD3\"    Lab Results   Component Value Date/Time    TSH 1.32 01/08/2025 10:16 AM

## 2025-06-18 RX ORDER — METOPROLOL SUCCINATE 25 MG/1
25 TABLET, EXTENDED RELEASE ORAL DAILY
Qty: 90 TABLET | Refills: 2 | Status: SHIPPED | OUTPATIENT
Start: 2025-06-18

## 2025-06-18 NOTE — TELEPHONE ENCOUNTER
Med refilled per VO by MD.    Future Appointments   Date Time Provider Department Center   7/8/2025 10:00 AM Elizabeth Rosales MD [facility] DEP

## 2025-06-28 DIAGNOSIS — R73.03 PREDIABETES: ICD-10-CM

## 2025-06-28 DIAGNOSIS — E78.5 HYPERLIPIDEMIA LDL GOAL <100: ICD-10-CM

## 2025-06-29 RX ORDER — METFORMIN HYDROCHLORIDE 500 MG/1
1000 TABLET, EXTENDED RELEASE ORAL
Qty: 180 TABLET | Refills: 1 | Status: SHIPPED | OUTPATIENT
Start: 2025-06-29

## 2025-06-29 RX ORDER — SIMVASTATIN 10 MG
10 TABLET ORAL NIGHTLY
Qty: 90 TABLET | Refills: 0 | Status: SHIPPED | OUTPATIENT
Start: 2025-06-29

## 2025-07-08 ENCOUNTER — OFFICE VISIT (OUTPATIENT)
Age: 50
End: 2025-07-08
Payer: COMMERCIAL

## 2025-07-08 VITALS
OXYGEN SATURATION: 98 % | WEIGHT: 193 LBS | HEART RATE: 65 BPM | TEMPERATURE: 97.1 F | SYSTOLIC BLOOD PRESSURE: 106 MMHG | RESPIRATION RATE: 16 BRPM | BODY MASS INDEX: 27.63 KG/M2 | HEIGHT: 70 IN | DIASTOLIC BLOOD PRESSURE: 74 MMHG

## 2025-07-08 DIAGNOSIS — Z95.810 PRESENCE OF AUTOMATIC CARDIOVERTER/DEFIBRILLATOR (AICD): ICD-10-CM

## 2025-07-08 DIAGNOSIS — E78.5 HYPERLIPIDEMIA LDL GOAL <100: Primary | ICD-10-CM

## 2025-07-08 DIAGNOSIS — R73.03 PREDIABETES: ICD-10-CM

## 2025-07-08 DIAGNOSIS — I49.8 BRUGADA SYNDROME: ICD-10-CM

## 2025-07-08 PROCEDURE — 99213 OFFICE O/P EST LOW 20 MIN: CPT | Performed by: FAMILY MEDICINE

## 2025-07-08 ASSESSMENT — ENCOUNTER SYMPTOMS
NAUSEA: 0
SHORTNESS OF BREATH: 0
CONSTIPATION: 0
WHEEZING: 0
DIARRHEA: 0
SORE THROAT: 0
COUGH: 0
ABDOMINAL PAIN: 0
BACK PAIN: 0

## 2025-07-08 ASSESSMENT — PATIENT HEALTH QUESTIONNAIRE - PHQ9
SUM OF ALL RESPONSES TO PHQ QUESTIONS 1-9: 0
1. LITTLE INTEREST OR PLEASURE IN DOING THINGS: NOT AT ALL
SUM OF ALL RESPONSES TO PHQ QUESTIONS 1-9: 0
SUM OF ALL RESPONSES TO PHQ QUESTIONS 1-9: 0
2. FEELING DOWN, DEPRESSED OR HOPELESS: NOT AT ALL
SUM OF ALL RESPONSES TO PHQ QUESTIONS 1-9: 0

## 2025-07-08 NOTE — PROGRESS NOTES
Chief Complaint   Patient presents with    Cholesterol Problem     Follow up         \"Have you been to the ER, urgent care clinic since your last visit?  Hospitalized since your last visit?\"    NO    “Have you seen or consulted any other health care providers outside of [facility] since your last visit?”    NO          Click Here for Release of Records Request           7/8/2025    10:21 AM   PHQ-9    Little interest or pleasure in doing things 0   Feeling down, depressed, or hopeless 0   PHQ-2 Score 0   PHQ-9 Total Score 0           Financial Resource Strain: Low Risk  (7/3/2023)    Overall Financial Resource Strain (CARDIA)     Difficulty of Paying Living Expenses: Not hard at all      Food Insecurity: No Food Insecurity (1/8/2025)    Hunger Vital Sign     Worried About Running Out of Food in the Last Year: Never true     Ran Out of Food in the Last Year: Never true          Health Maintenance Due   Topic Date Due    A1C test (Diabetic or Prediabetic)  04/08/2025

## 2025-07-08 NOTE — PROGRESS NOTES
Date:7/8/2025        Patient Name:Luis Felipe Kirkpatrick     YOB: 1975     Age:49 y.o.  The patient (or guardian, if applicable) and other individuals in attendance with the patient were advised that Artificial Intelligence will be utilized during this visit to record, process the conversation to generate a clinical note, and support improvement of the AI technology. The patient (or guardian, if applicable) and other individuals in attendance at the appointment consented to the use of AI, including the recording.        Seen today for   Chief Complaint   Patient presents with    Cholesterol Problem     Follow up       HPI     History of Present Illness  The patient is a 49-year-old male here for a follow-up on prediabetes and cholesterol.    He has been experiencing jet lag due to extensive travel over the past 7 weeks, which has disrupted his sleep patterns and limited his exercise routine. His diet during this period was primarily composed of homemade meals, as he prefers them for their health benefits. He reports no significant changes in his health since his last visit. He has not had any falls or required emergency or urgent care visits. He is scheduled to travel to [place] next week.    He has been under the care of Dr. Brambila for approximately 8 years, during which time he has not experienced any shocks from his ICD. His ICD was replaced on 02/21/2025. His last echocardiogram was performed in 09/2024.    He has been experiencing recurrent rotator cuff issues, for which he was advised to perform stretching exercises by Dr. Mary Jane Diaz. He has made modifications to his workstation, including changing his desk and adjusting his mouse angle to the left, which have been beneficial. He also notes that he sleeps with both hands above his head, which causes stress on his hands throughout the night. Occasionally, he experiences hand pain during sleep.    PAST SURGICAL HISTORY:  ICD replacement on

## 2025-07-13 LAB
ALBUMIN SERPL-MCNC: 4.5 G/DL (ref 4.1–5.1)
ALP SERPL-CCNC: 68 IU/L (ref 44–121)
ALT SERPL-CCNC: 38 IU/L (ref 0–44)
AST SERPL-CCNC: 26 IU/L (ref 0–40)
BILIRUB SERPL-MCNC: 0.5 MG/DL (ref 0–1.2)
BUN SERPL-MCNC: 13 MG/DL (ref 6–24)
BUN/CREAT SERPL: 13 (ref 9–20)
CALCIUM SERPL-MCNC: 9.7 MG/DL (ref 8.7–10.2)
CHLORIDE SERPL-SCNC: 103 MMOL/L (ref 96–106)
CHOLEST SERPL-MCNC: 175 MG/DL (ref 100–199)
CO2 SERPL-SCNC: 22 MMOL/L (ref 20–29)
CREAT SERPL-MCNC: 0.99 MG/DL (ref 0.76–1.27)
EGFRCR SERPLBLD CKD-EPI 2021: 93 ML/MIN/1.73
GLOBULIN SER CALC-MCNC: 2.6 G/DL (ref 1.5–4.5)
GLUCOSE SERPL-MCNC: 112 MG/DL (ref 70–99)
HDLC SERPL-MCNC: 39 MG/DL
LDLC SERPL CALC-MCNC: 110 MG/DL (ref 0–99)
POTASSIUM SERPL-SCNC: 5.1 MMOL/L (ref 3.5–5.2)
PROT SERPL-MCNC: 7.1 G/DL (ref 6–8.5)
SODIUM SERPL-SCNC: 139 MMOL/L (ref 134–144)
TRIGL SERPL-MCNC: 148 MG/DL (ref 0–149)
VLDLC SERPL CALC-MCNC: 26 MG/DL (ref 5–40)

## 2025-07-14 LAB
HBA1C MFR BLD: 6.9 % (ref 4.8–5.6)
IMP & REVIEW OF LAB RESULTS: NORMAL

## (undated) DEVICE — 3M™ IOBAN™ 2 ANTIMICROBIAL INCISE DRAPE 6650EZ: Brand: IOBAN™ 2

## (undated) DEVICE — LIMB HOLDER, WRIST/ANKLE: Brand: DEROYAL

## (undated) DEVICE — PACEMAKER PACK: Brand: MEDLINE INDUSTRIES, INC.

## (undated) DEVICE — ASTOUND STANDARD SURGICAL GOWN, XXL: Brand: CONVERTORS

## (undated) DEVICE — ELECTRODE,RADIOTRANSLUCENT,FOAM,5PK: Brand: MEDLINE

## (undated) DEVICE — APPLICATOR MEDICATED 10.5 CC SOLUTION CLR STRL CHLORAPREP

## (undated) DEVICE — SUTURE V-LOC 180 SZ 2-0 L9IN ABSRB VLT GS-21 L37MM 1/2 CIR VLOCM0345

## (undated) DEVICE — PADPRO DEFIBRILLATION/PACING/CARDIOVERSION/MONITORING ELECTRODES, ADULT/CHILD GREATER THAN 10 KG RADIOTRANSPARENT ELECTRODE, PHYSIO-CONTROL QUIK-COMBO (M) 60" (152 CM): Brand: PADPRO

## (undated) DEVICE — ELECTRODE ES AD CRD L15FT DISP FOR PT BELOW 30LB REM

## (undated) DEVICE — SYRINGE MED 10ML LUERLOCK TIP W/O SFTY DISP

## (undated) DEVICE — SYRINGE IRRIG 60ML SFT PLIABLE BLB EZ TO GRP 1 HND USE W/

## (undated) DEVICE — STRIP SKIN CLSR W0.25XL4IN WHT SPUNBOUND FBR NYL HI ADH

## (undated) DEVICE — DRESSING ANTIMIC FOAM OPTIFOAM POSTOP ADH 4 X 6 IN

## (undated) DEVICE — PLASMABLADE X PS210-030S-LIGHT 3.0SL: Brand: PLASMABLADE™ X

## (undated) DEVICE — SUTURE ABSORBABLE WND CLOSURE 4-0 P-12 12 IN UD V-LOC